# Patient Record
Sex: FEMALE | Race: WHITE | NOT HISPANIC OR LATINO | Employment: OTHER | URBAN - METROPOLITAN AREA
[De-identification: names, ages, dates, MRNs, and addresses within clinical notes are randomized per-mention and may not be internally consistent; named-entity substitution may affect disease eponyms.]

---

## 2017-07-17 ENCOUNTER — OFFICE VISIT (OUTPATIENT)
Dept: PODIATRY | Facility: CLINIC | Age: 62
End: 2017-07-17
Payer: COMMERCIAL

## 2017-07-17 ENCOUNTER — HOSPITAL ENCOUNTER (OUTPATIENT)
Dept: RADIOLOGY | Facility: HOSPITAL | Age: 62
Discharge: HOME | End: 2017-07-17
Attending: PODIATRIST
Payer: COMMERCIAL

## 2017-07-17 VITALS — HEIGHT: 66 IN | BODY MASS INDEX: 35.36 KG/M2 | WEIGHT: 220 LBS

## 2017-07-17 DIAGNOSIS — M79.672 FOOT PAIN, LEFT: ICD-10-CM

## 2017-07-17 DIAGNOSIS — M79.672 FOOT PAIN, LEFT: Primary | ICD-10-CM

## 2017-07-17 DIAGNOSIS — M76.72 PERONEAL TENDONITIS OF LEFT LOWER EXTREMITY: ICD-10-CM

## 2017-07-17 DIAGNOSIS — L60.3 NAIL DYSTROPHY: ICD-10-CM

## 2017-07-17 DIAGNOSIS — Q66.70 PES CAVUS: ICD-10-CM

## 2017-07-17 PROCEDURE — 87107 FUNGI IDENTIFICATION MOLD: CPT

## 2017-07-17 PROCEDURE — 73630 X-RAY EXAM OF FOOT: CPT | Mod: 26,LT,, | Performed by: RADIOLOGY

## 2017-07-17 PROCEDURE — 87102 FUNGUS ISOLATION CULTURE: CPT

## 2017-07-17 PROCEDURE — 99204 OFFICE O/P NEW MOD 45 MIN: CPT | Mod: S$GLB,,, | Performed by: PODIATRIST

## 2017-07-17 PROCEDURE — 99999 PR PBB SHADOW E&M-NEW PATIENT-LVL III: CPT | Mod: PBBFAC,,, | Performed by: PODIATRIST

## 2017-07-17 PROCEDURE — 73630 X-RAY EXAM OF FOOT: CPT | Mod: TC,PO,LT

## 2017-07-17 RX ORDER — CYANOCOBALAMIN 1000 UG/ML
1000 INJECTION, SOLUTION INTRAMUSCULAR; SUBCUTANEOUS
COMMUNITY
Start: 2017-05-09

## 2017-07-17 RX ORDER — METHYLPREDNISOLONE 4 MG/1
TABLET ORAL
Qty: 1 PACKAGE | Refills: 0 | Status: SHIPPED | OUTPATIENT
Start: 2017-07-17 | End: 2017-08-07

## 2017-07-17 RX ORDER — BENAZEPRIL/HYDROCHLOROTHIAZIDE 20 MG-25MG
TABLET ORAL
Status: ON HOLD | COMMUNITY
Start: 2017-04-24 | End: 2024-03-25 | Stop reason: HOSPADM

## 2017-07-17 RX ORDER — ESTROGENS, CONJUGATED 0.45 MG/1
TABLET, FILM COATED ORAL
COMMUNITY
Start: 2017-07-10 | End: 2024-01-19 | Stop reason: CLARIF

## 2017-07-17 RX ORDER — ERGOCALCIFEROL 1.25 MG/1
50000 CAPSULE ORAL
COMMUNITY
Start: 2017-07-10 | End: 2024-01-19 | Stop reason: CLARIF

## 2017-07-17 RX ORDER — LEVOTHYROXINE SODIUM 25 UG/1
TABLET ORAL
Status: ON HOLD | COMMUNITY
Start: 2017-04-24 | End: 2024-03-18 | Stop reason: DRUGHIGH

## 2017-07-17 NOTE — PROGRESS NOTES
Subjective:      Patient ID: Mora Adrian is a 61 y.o. female.    Chief Complaint: Foot Pain (left foot x 1 month) and Nail Problem (nail fungus)  Patient presents to clinic with the chief complaint of Lt. Lateral foot pain x 1 month.  Describes pain as burning and currently rates as a 2/10.  States symptoms are exacerbated with wearing flats and other unsupportive shoes.  Symptoms are alleviated with rest.  Denies trauma to the affected extremity.  Has not attempted to self treat.  Also, relates having discoloration of both great toenails and the Lt. 2nd toenail for > 1 year.  Denies experiencing pain from the nails and has not attempted to self treat.  Suspects she may have a fungal nail infection.  Inquires as to potential treatment options.  Denies any additional pedal complaints.          Past Medical History:   Diagnosis Date    Thyroid disease        Past Surgical History:   Procedure Laterality Date    APPENDECTOMY       SECTION, CLASSIC      GALLBLADDER SURGERY      GASTRIC BYPASS      HYSTERECTOMY         History reviewed. No pertinent family history.    Social History     Social History    Marital status:      Spouse name: N/A    Number of children: N/A    Years of education: N/A     Social History Main Topics    Smoking status: Never Smoker    Smokeless tobacco: Never Used    Alcohol use None    Drug use: Unknown    Sexual activity: Not Asked     Other Topics Concern    None     Social History Narrative    None       Current Outpatient Prescriptions   Medication Sig Dispense Refill    benazepril-hydrochlorthiazide (LOTENSIN HCT) 20-25 mg Tab       cyanocobalamin 1,000 mcg/mL injection       ergocalciferol (ERGOCALCIFEROL) 50,000 unit Cap       levothyroxine (SYNTHROID) 25 MCG tablet       PREMARIN 0.45 mg tablet       levothyroxine (SYNTHROID) 200 MCG tablet Take 200 mcg by mouth once daily.      methylPREDNISolone (MEDROL DOSEPACK) 4 mg tablet use as directed 1  Package 0     No current facility-administered medications for this visit.        Review of patient's allergies indicates:  No Known Allergies    Review of Systems   Constitution: Negative for chills and fever.   Cardiovascular: Negative for claudication and leg swelling.   Skin: Positive for color change and nail changes.   Musculoskeletal: Positive for myalgias. Negative for joint pain and joint swelling.   Neurological: Negative for numbness and paresthesias.   Psychiatric/Behavioral: Negative for altered mental status.           Objective:      Physical Exam   Constitutional: She is oriented to person, place, and time. She appears well-developed and well-nourished. No distress.   Cardiovascular:   Pulses:       Dorsalis pedis pulses are 2+ on the right side, and 2+ on the left side.        Posterior tibial pulses are 2+ on the right side, and 2+ on the left side.   CFT <3 seconds bilateral.  Pedal hair growth present bilateral.   No varicosities noted bilateral.  No bilateral lower extremity edema.   Musculoskeletal: She exhibits edema and tenderness.   Muscle strength 5/5 in all muscle groups bilateral.  No tenderness nor crepitation with ROM of foot/ankle joints bilateral.  Pain with palpation at the insertion of the Lt. Peroneus brevis tendon.  Mild, localized edema noted at the insertion as well.  Bilateral pes cavus foot type.      Neurological: She is alert and oriented to person, place, and time. She has normal strength. No sensory deficit.   Light touch intact bilateral.     Skin: Skin is warm, dry and intact. Capillary refill takes less than 2 seconds. No abrasion, no bruising, no burn, no laceration, no lesion, no petechiae and no rash noted. She is not diaphoretic. No erythema. No pallor.   Pedal skin has normal turgor, temperature, and texture bilateral.  Bilateral hallux toenail and the Lt. 2nd toenail appear thickened by 2 mm's, of normal length, and discolored with subungual debris.  Remaining  toenails x 7 appear normotrophic. Examination of the skin reveals no evidence of significant maceration, rashes, open lesions, suspicious appearing nevi or other concerning lesions.              Assessment:       Encounter Diagnoses   Name Primary?    Foot pain, left Yes    Peroneal tendonitis of left lower extremity     Pes cavus     Nail dystrophy          Plan:       Mora was seen today for foot pain and nail problem.    Diagnoses and all orders for this visit:    Foot pain, left  -     X-Ray Foot Complete Left; Future    Peroneal tendonitis of left lower extremity  -     methylPREDNISolone (MEDROL DOSEPACK) 4 mg tablet; use as directed    Pes cavus    Nail dystrophy  -     CULTURE, FUNGUS      I counseled the patient on her conditions, their implications and medical management.    With the patient's verbal consent, a sterile nail nipper was used to trim a portion of the Rt. Hallux toenail without incident.  This was then sent to [facility].  Patient tolerated this quite well.      Will further discuss treatment of onychomycosis pending culture results.    Advised to RICE the affected extremity.    Advised to take both an oral nsaid and to apply a topical analgesic to the peroneus brevis tendon.    Discussed avoidance of wearing flats, flip flops, and walking barefoot as this will exacerbate symptoms.    Discussed appropriate fitting shoe gear and OTC insoles to better control over supination.    RTC in 6 weeks for follow up.    Charlie Page DPM

## 2017-07-17 NOTE — PATIENT INSTRUCTIONS
Excipial cream with 20% urea.  Found at [employer].    Recommend applying ice to the outside of the foot up to 40 minutes daily.    Recommend wearing more supportive shoes x 6 weeks.    Avoid barefoot walking, flip flops, and flats as this will exacerbate symptoms.    Recommend applying a topical pain cream (aspercream) to the site of pain 2-4 times daily.    Continue taking advil to address inflammation.

## 2017-08-23 LAB — FUNGUS SPEC CULT: NORMAL

## 2019-09-09 DIAGNOSIS — Z12.39 SCREENING BREAST EXAMINATION: Primary | ICD-10-CM

## 2019-09-26 ENCOUNTER — HOSPITAL ENCOUNTER (OUTPATIENT)
Dept: RADIOLOGY | Facility: HOSPITAL | Age: 64
Discharge: HOME | End: 2019-09-26
Attending: SPECIALIST
Payer: COMMERCIAL

## 2019-09-26 DIAGNOSIS — Z12.39 SCREENING BREAST EXAMINATION: ICD-10-CM

## 2019-09-26 PROCEDURE — 77067 SCR MAMMO BI INCL CAD: CPT | Mod: TC,PO

## 2020-11-09 DIAGNOSIS — Z12.31 ENCOUNTER FOR SCREENING MAMMOGRAM FOR MALIGNANT NEOPLASM OF BREAST: Primary | ICD-10-CM

## 2020-12-09 ENCOUNTER — HOSPITAL ENCOUNTER (OUTPATIENT)
Dept: RADIOLOGY | Facility: HOSPITAL | Age: 65
Discharge: HOME | End: 2020-12-09
Attending: SPECIALIST
Payer: MEDICARE

## 2020-12-09 VITALS — WEIGHT: 220 LBS | HEIGHT: 66 IN | BODY MASS INDEX: 35.36 KG/M2

## 2020-12-09 DIAGNOSIS — Z12.31 ENCOUNTER FOR SCREENING MAMMOGRAM FOR MALIGNANT NEOPLASM OF BREAST: ICD-10-CM

## 2020-12-09 PROCEDURE — 77067 SCR MAMMO BI INCL CAD: CPT | Mod: TC,PO

## 2022-01-17 DIAGNOSIS — Z12.31 ENCOUNTER FOR SCREENING MAMMOGRAM FOR MALIGNANT NEOPLASM OF BREAST: Primary | ICD-10-CM

## 2022-02-17 ENCOUNTER — HOSPITAL ENCOUNTER (OUTPATIENT)
Dept: RADIOLOGY | Facility: HOSPITAL | Age: 67
Discharge: HOME | End: 2022-02-17
Attending: SPECIALIST
Payer: MEDICARE

## 2022-02-17 DIAGNOSIS — Z12.31 ENCOUNTER FOR SCREENING MAMMOGRAM FOR MALIGNANT NEOPLASM OF BREAST: ICD-10-CM

## 2022-02-17 PROCEDURE — 77063 BREAST TOMOSYNTHESIS BI: CPT | Mod: TC,PO

## 2022-03-24 ENCOUNTER — TELEPHONE (OUTPATIENT)
Dept: ORTHOPEDICS | Facility: CLINIC | Age: 67
End: 2022-03-24

## 2022-03-24 NOTE — TELEPHONE ENCOUNTER
----- Message from Susan Miller sent at 3/23/2022  5:52 PM CDT -----  Regarding: Letter  Good afternoon,    MMI LETTER DATED [id number] FROM [facility] Response  Scanned in media tab of patient's chart  Please fax response to our ofc at [phone]  Once completed thanks

## 2024-01-31 ENCOUNTER — TELEPHONE (OUTPATIENT)
Dept: CARDIOTHORACIC SURGERY | Facility: CLINIC | Age: 69
End: 2024-01-31
Payer: MEDICARE

## 2024-01-31 NOTE — TELEPHONE ENCOUNTER
Called pt to schedule consultation appointment with Dr. Bernal after receiving referral from Dr. Card. Scheduled pt for next available appointment. Pt confirms she has angiogram images on a disk and will bring to her appointment. Pt verbalized understanding of appointment date, time, and location.

## 2024-02-19 ENCOUNTER — TELEPHONE (OUTPATIENT)
Dept: CARDIOTHORACIC SURGERY | Facility: CLINIC | Age: 69
End: 2024-02-19
Payer: MEDICARE

## 2024-02-19 NOTE — TELEPHONE ENCOUNTER
Called pt to remind apt for tomorrow to confirmed apt.  Contact no was provided if pt wants to call back.   Portal message was sent as well.

## 2024-02-20 ENCOUNTER — OFFICE VISIT (OUTPATIENT)
Dept: CARDIOTHORACIC SURGERY | Facility: CLINIC | Age: 69
End: 2024-02-20
Payer: MEDICARE

## 2024-02-20 VITALS
HEIGHT: 66 IN | HEART RATE: 89 BPM | BODY MASS INDEX: 35.17 KG/M2 | OXYGEN SATURATION: 100 % | WEIGHT: 218.81 LBS | SYSTOLIC BLOOD PRESSURE: 148 MMHG | DIASTOLIC BLOOD PRESSURE: 67 MMHG

## 2024-02-20 DIAGNOSIS — I34.0 NONRHEUMATIC MITRAL VALVE REGURGITATION: Primary | ICD-10-CM

## 2024-02-20 PROCEDURE — 99205 OFFICE O/P NEW HI 60 MIN: CPT | Mod: S$PBB,,, | Performed by: THORACIC SURGERY (CARDIOTHORACIC VASCULAR SURGERY)

## 2024-02-20 PROCEDURE — 99213 OFFICE O/P EST LOW 20 MIN: CPT | Mod: PBBFAC | Performed by: THORACIC SURGERY (CARDIOTHORACIC VASCULAR SURGERY)

## 2024-02-20 PROCEDURE — 99999 PR PBB SHADOW E&M-EST. PATIENT-LVL III: CPT | Mod: PBBFAC,,, | Performed by: THORACIC SURGERY (CARDIOTHORACIC VASCULAR SURGERY)

## 2024-02-20 RX ORDER — ESTRADIOL 0.05 MG/D
1 FILM, EXTENDED RELEASE TRANSDERMAL
COMMUNITY

## 2024-02-20 RX ORDER — BENAZEPRIL HYDROCHLORIDE AND HYDROCHLOROTHIAZIDE 20; 12.5 MG/1; MG/1
1 TABLET ORAL EVERY MORNING
Status: ON HOLD | COMMUNITY
Start: 2024-01-22 | End: 2024-03-25 | Stop reason: HOSPADM

## 2024-02-20 RX ORDER — BRIMONIDINE TARTRATE 2 MG/ML
1 SOLUTION/ DROPS OPHTHALMIC 2 TIMES DAILY
COMMUNITY
Start: 2023-09-14

## 2024-02-20 RX ORDER — METOPROLOL SUCCINATE 25 MG/1
1 TABLET, EXTENDED RELEASE ORAL DAILY
Status: ON HOLD | COMMUNITY
Start: 2024-01-17 | End: 2024-03-25 | Stop reason: HOSPADM

## 2024-02-20 NOTE — PROGRESS NOTES
Subjective:      Patient ID: Mora Adrian is a 68 y.o. female.    Chief Complaint: No chief complaint on file.    HPI:  Mora Adrian is a 68 y.o. female who presents as an initial consult for severe mitral regurgitation.  She has a medical history significant for anxiety, arthritis, GERD, hypertension, hyperlipidemia, hypothyroidism and HFrEF.  She reports she was preparing for a knee operation and attempted to get cardiac clearance.  She underwent an evaluation which demonstrated severe mitral regurgitation, severely dilated left and right atrium, and an ejection fraction of 35-40%.  She denies any symptoms including dyspnea on exertion, chest pain, palpations, or lower extremity edema. She reports recently returning from [facility] and being able to ambulate without difficulty.     Family and social history reviewed    Review of patient's allergies indicates:  No Known Allergies  Past Medical History:   Diagnosis Date    Anxiety     Arthritis     GERD (gastroesophageal reflux disease)     Hyperlipidemia     Hypertension     Insomnia     Mitral valve insufficiency, unspecified etiology 2024    Thyroid disease     hypo     Past Surgical History:   Procedure Laterality Date    APPENDECTOMY      BREAST CYST ASPIRATION       SECTION, CLASSIC      EYE SURGERY Bilateral     cataracts w/iol    GALLBLADDER SURGERY      GASTRIC BYPASS      HYSTERECTOMY      TOTAL KNEE ARTHROPLASTY Right     TRANSESOPHAGEAL ECHOCARDIOGRAPHY N/A 2024    Procedure: ECHOCARDIOGRAM, TRANSESOPHAGEAL;  Surgeon: Bobby Card III, MD;  Location: [facility];  Service: Cardiology;  Laterality: N/A;     Family History       Problem Relation (Age of Onset)    Cirrhosis Father          Social History     Socioeconomic History    Marital status:    Tobacco Use    Smoking status: Never    Smokeless tobacco: Never   Substance and Sexual Activity    Alcohol use: Yes     Alcohol/week: 10.0 standard drinks of alcohol     Types:  10 Glasses of wine per week    Drug use: Never     Social Determinants of Health     Financial Resource Strain: Low Risk  (2/19/2024)    Overall Financial Resource Strain (CARDIA)     Difficulty of Paying Living Expenses: Not hard at all   Food Insecurity: No Food Insecurity (2/19/2024)    Hunger Vital Sign     Worried About Running Out of Food in the Last Year: Never true     Ran Out of Food in the Last Year: Never true   Transportation Needs: No Transportation Needs (2/19/2024)    PRAPARE - Transportation     Lack of Transportation (Medical): No     Lack of Transportation (Non-Medical): No   Physical Activity: Unknown (2/19/2024)    Exercise Vital Sign     Days of Exercise per Week: 0 days   Stress: No Stress Concern Present (2/19/2024)    [employer] - Occupational Stress Questionnaire     Feeling of Stress : Only a little   Social Connections: Unknown (2/19/2024)    Social Connection and Isolation Panel [NHANES]     Frequency of Communication with Friends and Family: More than three times a week     Frequency of Social Gatherings with Friends and Family: More than three times a week     Active Member of Clubs or Organizations: Yes     Attends Club or Organization Meetings: More than 4 times per year     Marital Status:    Housing Stability: Unknown (2/19/2024)    Housing Stability Vital Sign     Unable to Pay for Housing in the Last Year: No     Unstable Housing in the Last Year: No       Current Outpatient Medications:     benazepril-hydrochlorthiazide (LOTENSIN HCT) 20-12.5 mg per tablet, Take 1 tablet by mouth every morning., Disp: , Rfl:     benazepril-hydrochlorthiazide (LOTENSIN HCT) 20-25 mg Tab, , Disp: , Rfl:     brimonidine 0.2% (ALPHAGAN) 0.2 % Drop, Place 1 drop into the right eye 2 (two) times daily., Disp: , Rfl:     cyanocobalamin 1,000 mcg/mL injection, 1,000 mcg every 30 days., Disp: , Rfl:     estradiol 0.05 mg/24 hr td ptsw (VIVELLE-DOT) 0.05 mg/24 hr, 1  patch., Disp: , Rfl:     ezetimibe (ZETIA) 10 mg tablet, Take 10 mg by mouth once daily., Disp: , Rfl:     levothyroxine (SYNTHROID) 150 MCG tablet, Take 150 mcg by mouth before breakfast., Disp: , Rfl:     metoprolol succinate (TOPROL-XL) 25 MG 24 hr tablet, Take 1 tablet by mouth once daily., Disp: , Rfl:     pantoprazole (PROTONIX) 40 MG tablet, Take 40 mg by mouth once daily., Disp: , Rfl:     vitamin D (VITAMIN D3) 1000 units Tab, Take 1,000 Units by mouth once daily., Disp: , Rfl:     zinc gluconate 50 mg tablet, Take 50 mg by mouth once daily., Disp: , Rfl:     levothyroxine (SYNTHROID) 25 MCG tablet, , Disp: , Rfl:   Current medications Reviewed    Review of Systems   Constitutional:  Negative for activity change, appetite change, fatigue and fever.   HENT:  Negative for nosebleeds.    Respiratory:  Negative for cough and shortness of breath.    Cardiovascular:  Negative for chest pain, palpitations and leg swelling.   Gastrointestinal:  Negative for abdominal distention, abdominal pain and nausea.   Genitourinary:  Negative for frequency.   Musculoskeletal:  Negative for arthralgias and myalgias.   Skin:  Negative for rash.   Neurological:  Negative for dizziness and numbness.   Hematological:  Does not bruise/bleed easily.     Objective:   Physical Exam  Constitutional:       Appearance: Normal appearance.   HENT:      Head: Normocephalic.   Eyes:      Pupils: Pupils are equal, round, and reactive to light.   Cardiovascular:      Rate and Rhythm: Normal rate and regular rhythm.      Pulses: Normal pulses.   Pulmonary:      Effort: Pulmonary effort is normal.   Abdominal:      General: Abdomen is flat. Bowel sounds are normal.      Palpations: Abdomen is soft.   Musculoskeletal:         General: Normal range of motion.   Skin:     General: Skin is warm and dry.   Neurological:      General: No focal deficit present.      Mental Status: She is alert.         Diagnostic Results: Reviewed   ECHO (PACO)  1/25/2024:  Ejection fraction 35-40%  Left atrium is severely dilated   Right atrium is mildly dilated   Mitral valve has myomatous changes and severe mitral regurgitation    Assessment:   Severe Mitral Regurgitation   Plan:     CTS Attending Note:    I have personally taken the history and examined this patient and agree with the REINA's note as stated above.  Very pleasant 68-year-old woman who was being evaluated for knee replacement.  She had an abnormal EKG, and this led to a thoughtful and thorough evaluation which included transesophageal echocardiography as well as coronary angiography.  The coronary angiogram failed to demonstrate any hemodynamically significant lesions.  The echo demonstrated severe mitral regurgitation as well as a reduced ejection fraction.  She does report symptoms of decreased stamina and increased fatigue.  Due to her orthopedic issues, she does not have significant dyspnea on exertion.  I discussed her situation with her and her daughter in detail.  I recommended mitral valve repair, and she agreed with this.  I also recommended resection of left atrial appendage.  She does not have a history of atrial fibrillation, but she is clearly at risk.  She agreed with this as well.  We discussed the PRIMARY trial.  She is not interested in enrollment.  We discussed the risks and benefits of the proposed procedure, including but not limited to death, stroke, respiratory complications, renal complications, arrythmia, need for permanent pacemaker, and infection. We discussed the STS predicted risk. Her questions have been answered, and she wishes to proceed. After a discussion of the advantages and disadvantages of tissue and mechanical prostheses, she indicated that she desires a mechanical valve should repair not be feasible.

## 2024-02-23 ENCOUNTER — TELEPHONE (OUTPATIENT)
Dept: CARDIOTHORACIC SURGERY | Facility: CLINIC | Age: 69
End: 2024-02-23
Payer: MEDICARE

## 2024-02-23 DIAGNOSIS — R79.9 ABNORMAL FINDING OF BLOOD CHEMISTRY, UNSPECIFIED: ICD-10-CM

## 2024-02-23 DIAGNOSIS — R79.1 ABNORMAL COAGULATION PROFILE: ICD-10-CM

## 2024-02-23 DIAGNOSIS — I34.0 NONRHEUMATIC MITRAL VALVE REGURGITATION: Primary | ICD-10-CM

## 2024-02-23 DIAGNOSIS — Z01.818 PRE-OP TESTING: ICD-10-CM

## 2024-02-23 DIAGNOSIS — R74.8 ABNORMAL LEVELS OF OTHER SERUM ENZYMES: ICD-10-CM

## 2024-02-23 NOTE — TELEPHONE ENCOUNTER
Called pt to review pre-op testing appointments, but pt unavailable at this time. She will call me back to discuss. Pt has my direct number to call me back at her convenience.

## 2024-02-26 ENCOUNTER — TELEPHONE (OUTPATIENT)
Dept: CARDIOTHORACIC SURGERY | Facility: CLINIC | Age: 69
End: 2024-02-26
Payer: MEDICARE

## 2024-02-26 NOTE — TELEPHONE ENCOUNTER
Called pt to review pre-op testing appointments which have been scheduled for March 11 and March 15. Pt reports she recently had carotid ultrasound at her PCP's office. Procured copy of report which is dated October 2023. Will scan to media. Reviewed pt's medications. Pt will need to take last dose of benazepril-hydrochlorthiazide on Monday, March 11. Will send appointment details to pt's e-mail per her request. Pt verbalized understanding of all information.

## 2024-02-28 ENCOUNTER — TELEPHONE (OUTPATIENT)
Dept: CARDIOTHORACIC SURGERY | Facility: CLINIC | Age: 69
End: 2024-02-28
Payer: MEDICARE

## 2024-02-28 NOTE — TELEPHONE ENCOUNTER
Received call from pt reporting she tested positive for COVID yesterday, February 27. She reports her symptoms started over the weekend on Sunday, February 25. Advised pt that she must be at least 10 days from the onset of symptoms and symptom free to proceed with her testing appointments and scheduled surgery. Will follow up with pt next week on March 6, which will be 10 days from the onset of her symptoms, to see how she is feeling. Pt agreeable and verbalized understanding.

## 2024-03-01 ENCOUNTER — ANESTHESIA EVENT (OUTPATIENT)
Dept: SURGERY | Facility: HOSPITAL | Age: 69
DRG: 220 | End: 2024-03-01
Payer: MEDICARE

## 2024-03-07 ENCOUNTER — TELEPHONE (OUTPATIENT)
Dept: CARDIOTHORACIC SURGERY | Facility: CLINIC | Age: 69
End: 2024-03-07
Payer: MEDICARE

## 2024-03-07 NOTE — TELEPHONE ENCOUNTER
Received call from pt reporting she is feeling well and has no residual symptoms from her COVID infection.

## 2024-03-11 ENCOUNTER — TELEPHONE (OUTPATIENT)
Dept: CARDIOTHORACIC SURGERY | Facility: CLINIC | Age: 69
End: 2024-03-11
Payer: MEDICARE

## 2024-03-11 ENCOUNTER — HOSPITAL ENCOUNTER (OUTPATIENT)
Dept: CARDIOLOGY | Facility: HOSPITAL | Age: 69
Discharge: HOME | End: 2024-03-11
Attending: THORACIC SURGERY (CARDIOTHORACIC VASCULAR SURGERY)
Payer: MEDICARE

## 2024-03-11 VITALS — BODY MASS INDEX: 35.03 KG/M2 | HEIGHT: 66 IN | WEIGHT: 218 LBS

## 2024-03-11 DIAGNOSIS — I34.0 NONRHEUMATIC MITRAL VALVE REGURGITATION: ICD-10-CM

## 2024-03-11 DIAGNOSIS — Z01.818 PRE-OP TESTING: ICD-10-CM

## 2024-03-11 LAB
ASCENDING AORTA: 2.37 CM
AV INDEX (PROSTH): 0.71
AV MEAN GRADIENT: 3 MMHG
AV PEAK GRADIENT: 5 MMHG
AV VALVE AREA BY VELOCITY RATIO: 3.03 CM²
AV VALVE AREA: 3.12 CM²
AV VELOCITY RATIO: 0.69
BSA FOR ECHO PROCEDURE: 2.15 M2
CV ECHO LV RWT: 0.29 CM
DOP CALC AO PEAK VEL: 1.11 M/S
DOP CALC AO VTI: 25.2 CM
DOP CALC LVOT AREA: 4.4 CM2
DOP CALC LVOT DIAMETER: 2.36 CM
DOP CALC LVOT PEAK VEL: 0.77 M/S
DOP CALC LVOT STROKE VOLUME: 78.7 CM3
DOP CALCLVOT PEAK VEL VTI: 18 CM
E WAVE DECELERATION TIME: 149.27 MSEC
E/A RATIO: 2.78
E/E' RATIO: 13.06 M/S
ECHO LV POSTERIOR WALL: 0.87 CM (ref 0.6–1.1)
EJECTION FRACTION: 40 %
FRACTIONAL SHORTENING: 15 % (ref 28–44)
INTERVENTRICULAR SEPTUM: 0.92 CM (ref 0.6–1.1)
LEFT ATRIUM SIZE: 4.66 CM
LEFT ATRIUM VOLUME INDEX MOD: 50.5 ML/M2
LEFT ATRIUM VOLUME MOD: 104.55 CM3
LEFT INTERNAL DIMENSION IN SYSTOLE: 5.1 CM (ref 2.1–4)
LEFT VENTRICLE DIASTOLIC VOLUME INDEX: 86.54 ML/M2
LEFT VENTRICLE DIASTOLIC VOLUME: 179.13 ML
LEFT VENTRICLE MASS INDEX: 103 G/M2
LEFT VENTRICLE SYSTOLIC VOLUME INDEX: 60.7 ML/M2
LEFT VENTRICLE SYSTOLIC VOLUME: 125.59 ML
LEFT VENTRICULAR INTERNAL DIMENSION IN DIASTOLE: 5.99 CM (ref 3.5–6)
LEFT VENTRICULAR MASS: 213.58 G
LV LATERAL E/E' RATIO: 11.1 M/S
LV SEPTAL E/E' RATIO: 15.86 M/S
LVOT MG: 1.3 MMHG
LVOT MV: 0.53 CM/S
MR PISA EROA: 0.14 CM2
MV PEAK A VEL: 0.4 M/S
MV PEAK E VEL: 1.11 M/S
MV STENOSIS PRESSURE HALF TIME: 43.29 MS
MV VALVE AREA P 1/2 METHOD: 5.08 CM2
PISA MRMAX VEL: 5.45 M/S
PISA RADIUS: 0.63 CM
PISA TR MAX VEL: 2.88 M/S
PISA VN NYQUIST MS: 0.31 M/S
PISA VN NYQUIST: 0.31 M/S
PULM VEIN S/D RATIO: 0.47
PV PEAK D VEL: 0.79 M/S
PV PEAK S VEL: 0.37 M/S
RA PRESSURE ESTIMATED: 3 MMHG
RIGHT VENTRICULAR END-DIASTOLIC DIMENSION: 3.89 CM
RIGHT VENTRICULAR LENGTH IN DIASTOLE (APICAL 4-CHAMBER VIEW): 6.91 CM
RV MID DIAMA: 2.1 CM
RV TB RVSP: 6 MMHG
RV TISSUE DOPPLER FREE WALL SYSTOLIC VELOCITY 1 (APICAL 4 CHAMBER VIEW): 13.09 CM/S
SINUS: 2.78 CM
STJ: 2.65 CM
TDI LATERAL: 0.1 M/S
TDI SEPTAL: 0.07 M/S
TDI: 0.09 M/S
TR MAX PG: 33 MMHG
TRICUSPID ANNULAR PLANE SYSTOLIC EXCURSION: 2.2 CM
TV REST PULMONARY ARTERY PRESSURE: 36 MMHG
Z-SCORE OF LEFT VENTRICULAR DIMENSION IN END DIASTOLE: -0.49
Z-SCORE OF LEFT VENTRICULAR DIMENSION IN END SYSTOLE: 2.22

## 2024-03-11 PROCEDURE — 93306 TTE W/DOPPLER COMPLETE: CPT | Mod: PO

## 2024-03-11 PROCEDURE — 93306 TTE W/DOPPLER COMPLETE: CPT | Mod: 26,,, | Performed by: INTERNAL MEDICINE

## 2024-03-11 NOTE — TELEPHONE ENCOUNTER
Called pt to remind her to take last dose of benazepril-hydrochlorthiazide today in preparation for surgery. Pt verbalized understanding.  
WDL

## 2024-03-13 NOTE — H&P (VIEW-ONLY)
Subjective:      Patient ID: Mora Adrian is a 68 y.o. female.    Chief Complaint: No chief complaint on file.      HPI:  Mora Adrian is a 68 y.o. female who presents for pre-op mitral repair. Medical conditions include anxiety, arthritis, GERD, hypertension, hyperlipidemia, hypothyroidism and HFrEF. She reports she was preparing for a knee operation and attempted to get cardiac clearance. She underwent an evaluation which demonstrated severe mitral regurgitation, severely dilated left and right atrium, and an ejection fraction of 35-40%. She denies any symptoms including dyspnea on exertion, chest pain, palpations, or lower extremity edema. She reports recently returning from [facility] and being able to ambulate without difficulty.     Family and social history reviewed    Current Outpatient Medications   Medication Instructions    benazepril-hydrochlorthiazide (LOTENSIN HCT) 20-12.5 mg per tablet 1 tablet, Oral, Every morning    benazepril-hydrochlorthiazide (LOTENSIN HCT) 20-25 mg Tab No dose, route, or frequency recorded.    brimonidine 0.2% (ALPHAGAN) 0.2 % Drop 1 drop, Right Eye, 2 times daily    cyanocobalamin 1,000 mcg, Every 30 days    estradiol 0.05 mg/24 hr td ptsw (VIVELLE-DOT) 0.05 mg/24 hr 1 patch    ezetimibe (ZETIA) 10 mg, Oral, Daily    levothyroxine (SYNTHROID) 25 MCG tablet No dose, route, or frequency recorded.    levothyroxine (SYNTHROID) 150 mcg, Oral, Before breakfast    metoprolol succinate (TOPROL-XL) 25 MG 24 hr tablet 1 tablet, Oral, Daily    pantoprazole (PROTONIX) 40 mg, Oral, Daily    vitamin D (VITAMIN D3) 1,000 Units, Oral, Daily    zinc gluconate 50 mg, Oral, Daily         Review of patient's allergies indicates:  No Known Allergies  Past Medical History:   Diagnosis Date    Anxiety     Arthritis     GERD (gastroesophageal reflux disease)     Hyperlipidemia     Hypertension     Insomnia     Mitral valve insufficiency, unspecified etiology 01/2024    Thyroid disease     hypo      Past Surgical History:   Procedure Laterality Date    APPENDECTOMY      BREAST CYST ASPIRATION       SECTION, CLASSIC      EYE SURGERY Bilateral     cataracts w/iol    GALLBLADDER SURGERY      GASTRIC BYPASS      HYSTERECTOMY      TOTAL KNEE ARTHROPLASTY Right     TRANSESOPHAGEAL ECHOCARDIOGRAPHY N/A 2024    Procedure: ECHOCARDIOGRAM, TRANSESOPHAGEAL;  Surgeon: Bobby Card III, MD;  Location: [facility];  Service: Cardiology;  Laterality: N/A;     Family History       Problem Relation (Age of Onset)    Cirrhosis Father          Social History     Socioeconomic History    Marital status:    Tobacco Use    Smoking status: Never    Smokeless tobacco: Never   Substance and Sexual Activity    Alcohol use: Yes     Alcohol/week: 10.0 standard drinks of alcohol     Types: 10 Glasses of wine per week    Drug use: Never     Social Determinants of Health     Financial Resource Strain: Low Risk  (2024)    Overall Financial Resource Strain (CARDIA)     Difficulty of Paying Living Expenses: Not hard at all   Food Insecurity: No Food Insecurity (2024)    Hunger Vital Sign     Worried About Running Out of Food in the Last Year: Never true     Ran Out of Food in the Last Year: Never true   Transportation Needs: No Transportation Needs (2024)    PRAPARE - Transportation     Lack of Transportation (Medical): No     Lack of Transportation (Non-Medical): No   Physical Activity: Unknown (2024)    Exercise Vital Sign     Days of Exercise per Week: 0 days   Stress: No Stress Concern Present (2024)    [employer] - Occupational Stress Questionnaire     Feeling of Stress : Only a little   Social Connections: Unknown (2024)    Social Connection and Isolation Panel [NHANES]     Frequency of Communication with Friends and Family: More than three times a week     Frequency of Social Gatherings with Friends and Family: More than three times a week     Active  Member of Clubs or Organizations: Yes     Attends Club or Organization Meetings: More than 4 times per year     Marital Status:    Housing Stability: Unknown (2/19/2024)    Housing Stability Vital Sign     Unable to Pay for Housing in the Last Year: No     Unstable Housing in the Last Year: No       Current medications Reviewed    Review of Systems   Constitutional:  Positive for fatigue.   HENT:  Negative for nosebleeds.    Eyes:  Negative for visual disturbance.   Respiratory:  Negative for shortness of breath.    Cardiovascular:  Negative for chest pain and leg swelling.   Gastrointestinal:  Negative for nausea.   Musculoskeletal:  Negative for gait problem.   Skin:  Negative for color change.   Neurological:  Negative for dizziness.   Hematological:  Does not bruise/bleed easily.   Psychiatric/Behavioral:  Negative for sleep disturbance.      Objective:   Physical Exam  Constitutional:       General: She is not in acute distress.  HENT:      Head: Normocephalic and atraumatic.   Eyes:      Pupils: Pupils are equal, round, and reactive to light.   Cardiovascular:      Rate and Rhythm: Normal rate.   Pulmonary:      Effort: Pulmonary effort is normal. No respiratory distress.   Musculoskeletal:         General: Normal range of motion.      Cervical back: Normal range of motion.   Skin:     Coloration: Skin is not pale.   Neurological:      General: No focal deficit present.      Mental Status: She is alert.   Psychiatric:         Mood and Affect: Mood normal.         Behavior: Behavior normal.         Diagnostic Results: reviewed   PACO 1/25/2024:  Ejection fraction 35-40%  Left atrium is severely dilated   Right atrium is mildly dilated   Mitral valve has myomatous changes and severe mitral regurgitation     TTE 3/11/24       Left Ventricle: The left ventricle is moderately dilated. Normal wall thickness. There is eccentric hypertrophy. There is mildly reduced systolic function. Ejection fraction by visual  approximation is 40%.    Right Ventricle: Normal right ventricular cavity size. Wall thickness is normal. Right ventricle wall motion  is normal. Systolic function is normal.    Left Atrium: Left atrium is dilated. The left atrium volume index MOD is 50.5 mL/m2.    Mitral Valve: There is modearte to moderate/severe regurgitation.  Based on images of mitral valve structure, MR seems more functional as opposed to degenerative, but can not rule out degenerative based on these images    Tricuspid Valve: There is mild regurgitation.    Pulmonary Artery: The estimated pulmonary artery systolic pressure is 36 mmHg.    IVC/SVC: Normal venous pressure at 3 mmHg.     There is modearte to moderate/severe regurgitation. Based on images of mitral valve structure, MR seems more functional as opposed to degenerative, but can not rule out degenerative based on these images.     [facility] clean   Assessment:   MR  Plan:   Pre-op mitral repair and Left atrial appendage ligation. Desires mechanical valve if repair not feasible.       CTS Attending Note:    I have personally taken the history and examined this patient and agree with the REINA's note as stated above.  68-year-old woman with severe mitral regurgitation.  We plan mitral valve repair.  We also plan to resect her left atrial appendage.  Her questions have been answered, and she wishes to proceed.

## 2024-03-13 NOTE — PROGRESS NOTES
Subjective:      Patient ID: Mora Adrian is a 68 y.o. female.    Chief Complaint: No chief complaint on file.      HPI:  Mora Adrian is a 68 y.o. female who presents for pre-op mitral repair. Medical conditions include anxiety, arthritis, GERD, hypertension, hyperlipidemia, hypothyroidism and HFrEF. She reports she was preparing for a knee operation and attempted to get cardiac clearance. She underwent an evaluation which demonstrated severe mitral regurgitation, severely dilated left and right atrium, and an ejection fraction of 35-40%. She denies any symptoms including dyspnea on exertion, chest pain, palpations, or lower extremity edema. She reports recently returning from [facility] and being able to ambulate without difficulty.     Family and social history reviewed    Current Outpatient Medications   Medication Instructions    benazepril-hydrochlorthiazide (LOTENSIN HCT) 20-12.5 mg per tablet 1 tablet, Oral, Every morning    benazepril-hydrochlorthiazide (LOTENSIN HCT) 20-25 mg Tab No dose, route, or frequency recorded.    brimonidine 0.2% (ALPHAGAN) 0.2 % Drop 1 drop, Right Eye, 2 times daily    cyanocobalamin 1,000 mcg, Every 30 days    estradiol 0.05 mg/24 hr td ptsw (VIVELLE-DOT) 0.05 mg/24 hr 1 patch    ezetimibe (ZETIA) 10 mg, Oral, Daily    levothyroxine (SYNTHROID) 25 MCG tablet No dose, route, or frequency recorded.    levothyroxine (SYNTHROID) 150 mcg, Oral, Before breakfast    metoprolol succinate (TOPROL-XL) 25 MG 24 hr tablet 1 tablet, Oral, Daily    pantoprazole (PROTONIX) 40 mg, Oral, Daily    vitamin D (VITAMIN D3) 1,000 Units, Oral, Daily    zinc gluconate 50 mg, Oral, Daily         Review of patient's allergies indicates:  No Known Allergies  Past Medical History:   Diagnosis Date    Anxiety     Arthritis     GERD (gastroesophageal reflux disease)     Hyperlipidemia     Hypertension     Insomnia     Mitral valve insufficiency, unspecified etiology 01/2024    Thyroid disease     hypo      Past Surgical History:   Procedure Laterality Date    APPENDECTOMY      BREAST CYST ASPIRATION       SECTION, CLASSIC      EYE SURGERY Bilateral     cataracts w/iol    GALLBLADDER SURGERY      GASTRIC BYPASS      HYSTERECTOMY      TOTAL KNEE ARTHROPLASTY Right     TRANSESOPHAGEAL ECHOCARDIOGRAPHY N/A 2024    Procedure: ECHOCARDIOGRAM, TRANSESOPHAGEAL;  Surgeon: Bobby Card III, MD;  Location: [facility];  Service: Cardiology;  Laterality: N/A;     Family History       Problem Relation (Age of Onset)    Cirrhosis Father          Social History     Socioeconomic History    Marital status:    Tobacco Use    Smoking status: Never    Smokeless tobacco: Never   Substance and Sexual Activity    Alcohol use: Yes     Alcohol/week: 10.0 standard drinks of alcohol     Types: 10 Glasses of wine per week    Drug use: Never     Social Determinants of Health     Financial Resource Strain: Low Risk  (2024)    Overall Financial Resource Strain (CARDIA)     Difficulty of Paying Living Expenses: Not hard at all   Food Insecurity: No Food Insecurity (2024)    Hunger Vital Sign     Worried About Running Out of Food in the Last Year: Never true     Ran Out of Food in the Last Year: Never true   Transportation Needs: No Transportation Needs (2024)    PRAPARE - Transportation     Lack of Transportation (Medical): No     Lack of Transportation (Non-Medical): No   Physical Activity: Unknown (2024)    Exercise Vital Sign     Days of Exercise per Week: 0 days   Stress: No Stress Concern Present (2024)    [employer] [place] [employer] - Occupational Stress Questionnaire     Feeling of Stress : Only a little   Social Connections: Unknown (2024)    Social Connection and Isolation Panel [NHANES]     Frequency of Communication with Friends and Family: More than three times a week     Frequency of Social Gatherings with Friends and Family: More than three times a week     Active  Member of Clubs or Organizations: Yes     Attends Club or Organization Meetings: More than 4 times per year     Marital Status:    Housing Stability: Unknown (2/19/2024)    Housing Stability Vital Sign     Unable to Pay for Housing in the Last Year: No     Unstable Housing in the Last Year: No       Current medications Reviewed    Review of Systems   Constitutional:  Positive for fatigue.   HENT:  Negative for nosebleeds.    Eyes:  Negative for visual disturbance.   Respiratory:  Negative for shortness of breath.    Cardiovascular:  Negative for chest pain and leg swelling.   Gastrointestinal:  Negative for nausea.   Musculoskeletal:  Negative for gait problem.   Skin:  Negative for color change.   Neurological:  Negative for dizziness.   Hematological:  Does not bruise/bleed easily.   Psychiatric/Behavioral:  Negative for sleep disturbance.      Objective:   Physical Exam  Constitutional:       General: She is not in acute distress.  HENT:      Head: Normocephalic and atraumatic.   Eyes:      Pupils: Pupils are equal, round, and reactive to light.   Cardiovascular:      Rate and Rhythm: Normal rate.   Pulmonary:      Effort: Pulmonary effort is normal. No respiratory distress.   Musculoskeletal:         General: Normal range of motion.      Cervical back: Normal range of motion.   Skin:     Coloration: Skin is not pale.   Neurological:      General: No focal deficit present.      Mental Status: She is alert.   Psychiatric:         Mood and Affect: Mood normal.         Behavior: Behavior normal.         Diagnostic Results: reviewed   PACO 1/25/2024:  Ejection fraction 35-40%  Left atrium is severely dilated   Right atrium is mildly dilated   Mitral valve has myomatous changes and severe mitral regurgitation     TTE 3/11/24       Left Ventricle: The left ventricle is moderately dilated. Normal wall thickness. There is eccentric hypertrophy. There is mildly reduced systolic function. Ejection fraction by visual  approximation is 40%.    Right Ventricle: Normal right ventricular cavity size. Wall thickness is normal. Right ventricle wall motion  is normal. Systolic function is normal.    Left Atrium: Left atrium is dilated. The left atrium volume index MOD is 50.5 mL/m2.    Mitral Valve: There is modearte to moderate/severe regurgitation.  Based on images of mitral valve structure, MR seems more functional as opposed to degenerative, but can not rule out degenerative based on these images    Tricuspid Valve: There is mild regurgitation.    Pulmonary Artery: The estimated pulmonary artery systolic pressure is 36 mmHg.    IVC/SVC: Normal venous pressure at 3 mmHg.     There is modearte to moderate/severe regurgitation. Based on images of mitral valve structure, MR seems more functional as opposed to degenerative, but can not rule out degenerative based on these images.     [facility] clean   Assessment:   MR  Plan:   Pre-op mitral repair and Left atrial appendage ligation. Desires mechanical valve if repair not feasible.       CTS Attending Note:    I have personally taken the history and examined this patient and agree with the REINA's note as stated above.  68-year-old woman with severe mitral regurgitation.  We plan mitral valve repair.  We also plan to resect her left atrial appendage.  Her questions have been answered, and she wishes to proceed.

## 2024-03-14 ENCOUNTER — HOSPITAL ENCOUNTER (OUTPATIENT)
Dept: PULMONOLOGY | Facility: CLINIC | Age: 69
Discharge: HOME | DRG: 220 | End: 2024-03-14
Payer: MEDICARE

## 2024-03-14 ENCOUNTER — OFFICE VISIT (OUTPATIENT)
Dept: CARDIOTHORACIC SURGERY | Facility: CLINIC | Age: 69
DRG: 220 | End: 2024-03-14
Payer: MEDICARE

## 2024-03-14 ENCOUNTER — TELEPHONE (OUTPATIENT)
Dept: CARDIOTHORACIC SURGERY | Facility: CLINIC | Age: 69
End: 2024-03-14
Payer: MEDICARE

## 2024-03-14 ENCOUNTER — HOSPITAL ENCOUNTER (OUTPATIENT)
Dept: RADIOLOGY | Facility: HOSPITAL | Age: 69
Discharge: HOME | DRG: 220 | End: 2024-03-14
Attending: THORACIC SURGERY (CARDIOTHORACIC VASCULAR SURGERY)
Payer: MEDICARE

## 2024-03-14 ENCOUNTER — HOSPITAL ENCOUNTER (OUTPATIENT)
Dept: CARDIOLOGY | Facility: CLINIC | Age: 69
Discharge: HOME | DRG: 220 | End: 2024-03-14
Payer: MEDICARE

## 2024-03-14 VITALS
OXYGEN SATURATION: 99 % | HEIGHT: 66 IN | HEART RATE: 89 BPM | BODY MASS INDEX: 35.63 KG/M2 | WEIGHT: 221.69 LBS | DIASTOLIC BLOOD PRESSURE: 79 MMHG | SYSTOLIC BLOOD PRESSURE: 171 MMHG

## 2024-03-14 DIAGNOSIS — Z01.818 PRE-OP TESTING: ICD-10-CM

## 2024-03-14 DIAGNOSIS — I10 HYPERTENSION, UNSPECIFIED TYPE: ICD-10-CM

## 2024-03-14 DIAGNOSIS — I34.0 NONRHEUMATIC MITRAL VALVE REGURGITATION: ICD-10-CM

## 2024-03-14 DIAGNOSIS — I50.20 HEART FAILURE WITH REDUCED EJECTION FRACTION: ICD-10-CM

## 2024-03-14 DIAGNOSIS — E78.5 HYPERLIPIDEMIA, UNSPECIFIED HYPERLIPIDEMIA TYPE: ICD-10-CM

## 2024-03-14 DIAGNOSIS — I34.0 NONRHEUMATIC MITRAL VALVE REGURGITATION: Primary | ICD-10-CM

## 2024-03-14 DIAGNOSIS — K21.9 GASTROESOPHAGEAL REFLUX DISEASE, UNSPECIFIED WHETHER ESOPHAGITIS PRESENT: ICD-10-CM

## 2024-03-14 DIAGNOSIS — E03.9 HYPOTHYROIDISM, UNSPECIFIED TYPE: ICD-10-CM

## 2024-03-14 LAB
DLCO SINGLE BREATH LLN: 17.08
DLCO SINGLE BREATH PRE REF: 68.7 %
DLCO SINGLE BREATH REF: 22.82
DLCOC SBVA LLN: 2.97
DLCOC SBVA REF: 4.33
DLCOC SINGLE BREATH LLN: 17.08
DLCOC SINGLE BREATH REF: 22.82
DLCOCSBVAULN: 5.68
DLCOCSINGLEBREATHULN: 28.55
DLCOSINGLEBREATHULN: 28.55
DLCOVA LLN: 2.97
DLCOVA PRE REF: 82.8 %
DLCOVA PRE: 3.58 ML/(MIN*MMHG*L) (ref 2.97–5.68)
DLCOVA REF: 4.33
DLCOVAULN: 5.68
FEF 25 75 LLN: 0.95
FEF 25 75 PRE REF: 98.9 %
FEF 25 75 REF: 2.02
FEV05 LLN: 0.97
FEV05 REF: 1.83
FEV1 FVC LLN: 65
FEV1 FVC PRE REF: 99.2 %
FEV1 FVC REF: 78
FEV1 LLN: 1.77
FEV1 PRE REF: 89.8 %
FEV1 REF: 2.41
FVC LLN: 2.28
FVC PRE REF: 89.8 %
FVC REF: 3.11
IVC PRE: 2.78 L (ref 2.28–3.98)
IVC SINGLE BREATH LLN: 2.28
IVC SINGLE BREATH PRE REF: 89.5 %
IVC SINGLE BREATH REF: 3.11
IVCSINGLEBREATHULN: 3.98
MVV LLN: 79
MVV PRE REF: 68.1 %
MVV REF: 93
OHS QRS DURATION: 94 MS
OHS QTC CALCULATION: 459 MS
PEF LLN: 4.29
PEF PRE REF: 87.1 %
PEF REF: 6.1
PHYSICIAN COMMENT: ABNORMAL
PRE DLCO: 15.66 ML/(MIN*MMHG) (ref 17.08–28.55)
PRE FEF 25 75: 1.99 L/S (ref 0.95–3.52)
PRE FET 100: 6.47 SEC
PRE FEV05 REF: 97.7 %
PRE FEV1 FVC: 77.47 % (ref 65.06–89.25)
PRE FEV1: 2.16 L (ref 1.77–3.02)
PRE FEV5: 1.78 L (ref 0.97–2.68)
PRE FVC: 2.79 L (ref 2.28–3.98)
PRE MVV: 63.25 L/MIN (ref 78.94–106.8)
PRE PEF: 5.32 L/S (ref 4.29–7.92)
VA PRE: 4.37 L (ref 5.12–5.12)
VA SINGLE BREATH LLN: 5.12
VA SINGLE BREATH PRE REF: 85.4 %
VA SINGLE BREATH REF: 5.12
VASINGLEBREATHULN: 5.12

## 2024-03-14 PROCEDURE — 71046 X-RAY EXAM CHEST 2 VIEWS: CPT | Mod: 26,,, | Performed by: RADIOLOGY

## 2024-03-14 PROCEDURE — 99999 PR PBB SHADOW E&M-EST. PATIENT-LVL IV: CPT | Mod: PBBFAC,,, | Performed by: THORACIC SURGERY (CARDIOTHORACIC VASCULAR SURGERY)

## 2024-03-14 PROCEDURE — 93005 ELECTROCARDIOGRAM TRACING: CPT | Mod: PBBFAC | Performed by: INTERNAL MEDICINE

## 2024-03-14 PROCEDURE — 94010 BREATHING CAPACITY TEST: CPT | Mod: 26,S$PBB,, | Performed by: INTERNAL MEDICINE

## 2024-03-14 PROCEDURE — 99214 OFFICE O/P EST MOD 30 MIN: CPT | Mod: PBBFAC,25 | Performed by: THORACIC SURGERY (CARDIOTHORACIC VASCULAR SURGERY)

## 2024-03-14 PROCEDURE — 71046 X-RAY EXAM CHEST 2 VIEWS: CPT | Mod: TC

## 2024-03-14 PROCEDURE — 99499 UNLISTED E&M SERVICE: CPT | Mod: S$PBB,,, | Performed by: THORACIC SURGERY (CARDIOTHORACIC VASCULAR SURGERY)

## 2024-03-14 PROCEDURE — 94729 DIFFUSING CAPACITY: CPT | Mod: PBBFAC | Performed by: INTERNAL MEDICINE

## 2024-03-14 PROCEDURE — 94010 BREATHING CAPACITY TEST: CPT | Mod: PBBFAC | Performed by: INTERNAL MEDICINE

## 2024-03-14 PROCEDURE — 93010 ELECTROCARDIOGRAM REPORT: CPT | Mod: S$PBB,,, | Performed by: INTERNAL MEDICINE

## 2024-03-14 PROCEDURE — 94729 DIFFUSING CAPACITY: CPT | Mod: 26,S$PBB,, | Performed by: INTERNAL MEDICINE

## 2024-03-14 RX ORDER — OXYCODONE HYDROCHLORIDE 5 MG/1
5 TABLET ORAL EVERY 4 HOURS PRN
Status: CANCELLED | OUTPATIENT
Start: 2024-03-14

## 2024-03-14 RX ORDER — ASPIRIN 300 MG/1
300 SUPPOSITORY RECTAL ONCE AS NEEDED
Status: CANCELLED | OUTPATIENT
Start: 2024-03-14 | End: 2035-08-11

## 2024-03-14 RX ORDER — CEFAZOLIN SODIUM 2 G/50ML
2 SOLUTION INTRAVENOUS
Status: CANCELLED | OUTPATIENT
Start: 2024-03-14

## 2024-03-14 RX ORDER — DOCUSATE SODIUM 100 MG/1
100 CAPSULE, LIQUID FILLED ORAL 2 TIMES DAILY
Status: CANCELLED | OUTPATIENT
Start: 2024-03-14

## 2024-03-14 RX ORDER — FENTANYL CITRATE 50 UG/ML
25 INJECTION, SOLUTION INTRAMUSCULAR; INTRAVENOUS
Status: CANCELLED | OUTPATIENT
Start: 2024-03-14 | End: 2024-03-16

## 2024-03-14 RX ORDER — ONDANSETRON HYDROCHLORIDE 2 MG/ML
4 INJECTION, SOLUTION INTRAVENOUS EVERY 12 HOURS PRN
Status: CANCELLED | OUTPATIENT
Start: 2024-03-14

## 2024-03-14 RX ORDER — MAGNESIUM SULFATE HEPTAHYDRATE 40 MG/ML
2 INJECTION, SOLUTION INTRAVENOUS
Status: CANCELLED | OUTPATIENT
Start: 2024-03-14

## 2024-03-14 RX ORDER — NAPROXEN SODIUM 220 MG/1
81 TABLET, FILM COATED ORAL ONCE
Status: CANCELLED | OUTPATIENT
Start: 2024-03-14 | End: 2024-03-14

## 2024-03-14 RX ORDER — OXYCODONE HYDROCHLORIDE 10 MG/1
10 TABLET ORAL EVERY 4 HOURS PRN
Status: CANCELLED | OUTPATIENT
Start: 2024-03-14

## 2024-03-14 RX ORDER — PROPOFOL 10 MG/ML
0-50 INJECTION, EMULSION INTRAVENOUS CONTINUOUS
Status: CANCELLED | OUTPATIENT
Start: 2024-03-14

## 2024-03-14 RX ORDER — DEXTROSE MONOHYDRATE, SODIUM CHLORIDE, AND POTASSIUM CHLORIDE 50; 1.49; 4.5 G/1000ML; G/1000ML; G/1000ML
INJECTION, SOLUTION INTRAVENOUS CONTINUOUS
Status: CANCELLED | OUTPATIENT
Start: 2024-03-14

## 2024-03-14 RX ORDER — FENTANYL CITRATE 50 UG/ML
25 INJECTION, SOLUTION INTRAMUSCULAR; INTRAVENOUS
Status: CANCELLED | OUTPATIENT
Start: 2024-03-14

## 2024-03-14 RX ORDER — ACETAMINOPHEN 325 MG/1
650 TABLET ORAL EVERY 4 HOURS PRN
Status: CANCELLED | OUTPATIENT
Start: 2024-03-14

## 2024-03-14 RX ORDER — FENTANYL CITRATE 50 UG/ML
50 INJECTION, SOLUTION INTRAMUSCULAR; INTRAVENOUS
Status: CANCELLED | OUTPATIENT
Start: 2024-03-17

## 2024-03-14 RX ORDER — MAGNESIUM SULFATE/D5W 2 G/50 ML
4 INTRAVENOUS SOLUTION, PIGGYBACK (ML) INTRAVENOUS
Status: CANCELLED | OUTPATIENT
Start: 2024-03-14

## 2024-03-14 RX ORDER — POTASSIUM CHLORIDE 29.8 MG/ML
40 INJECTION INTRAVENOUS
Status: CANCELLED | OUTPATIENT
Start: 2024-03-14

## 2024-03-14 RX ORDER — BISACODYL 10 MG/1
10 SUPPOSITORY RECTAL DAILY PRN
Status: CANCELLED | OUTPATIENT
Start: 2024-03-14

## 2024-03-14 RX ORDER — METOCLOPRAMIDE HYDROCHLORIDE 5 MG/ML
5 INJECTION INTRAMUSCULAR; INTRAVENOUS EVERY 6 HOURS PRN
Status: CANCELLED | OUTPATIENT
Start: 2024-03-14

## 2024-03-14 RX ORDER — SODIUM CHLORIDE 0.9 % (FLUSH) 0.9 %
10 SYRINGE (ML) INJECTION
Status: CANCELLED | OUTPATIENT
Start: 2024-03-14

## 2024-03-14 RX ORDER — ALBUMIN HUMAN 50 G/1000ML
25 SOLUTION INTRAVENOUS ONCE AS NEEDED
Status: CANCELLED | OUTPATIENT
Start: 2024-03-14 | End: 2035-08-11

## 2024-03-14 RX ORDER — POTASSIUM CHLORIDE 20 MEQ/1
20 TABLET, EXTENDED RELEASE ORAL EVERY 12 HOURS
Status: CANCELLED | OUTPATIENT
Start: 2024-03-14

## 2024-03-14 RX ORDER — LIDOCAINE HYDROCHLORIDE 10 MG/ML
1 INJECTION, SOLUTION EPIDURAL; INFILTRATION; INTRACAUDAL; PERINEURAL
Status: CANCELLED | OUTPATIENT
Start: 2024-03-14

## 2024-03-14 RX ORDER — METOPROLOL TARTRATE 25 MG/1
25 TABLET, FILM COATED ORAL
Status: CANCELLED | OUTPATIENT
Start: 2024-03-14

## 2024-03-14 RX ORDER — POLYETHYLENE GLYCOL 3350 17 G/17G
17 POWDER, FOR SOLUTION ORAL DAILY
Status: CANCELLED | OUTPATIENT
Start: 2024-03-15

## 2024-03-14 RX ORDER — FAMOTIDINE 20 MG/1
20 TABLET, FILM COATED ORAL 2 TIMES DAILY
Status: CANCELLED | OUTPATIENT
Start: 2024-03-14

## 2024-03-14 RX ORDER — SODIUM CHLORIDE 9 MG/ML
INJECTION, SOLUTION INTRAVENOUS CONTINUOUS
Status: CANCELLED | OUTPATIENT
Start: 2024-03-14

## 2024-03-14 RX ORDER — NAPROXEN SODIUM 220 MG/1
81 TABLET, FILM COATED ORAL DAILY
Status: CANCELLED | OUTPATIENT
Start: 2024-03-15

## 2024-03-14 RX ORDER — ATORVASTATIN CALCIUM 40 MG/1
40 TABLET, FILM COATED ORAL NIGHTLY
Status: CANCELLED | OUTPATIENT
Start: 2024-03-14

## 2024-03-14 RX ORDER — POTASSIUM CHLORIDE 14.9 MG/ML
60 INJECTION INTRAVENOUS
Status: CANCELLED | OUTPATIENT
Start: 2024-03-14

## 2024-03-14 RX ORDER — CEFAZOLIN SODIUM 2 G/50ML
2 SOLUTION INTRAVENOUS
Status: CANCELLED | OUTPATIENT
Start: 2024-03-14 | End: 2024-03-16

## 2024-03-14 RX ORDER — FAMOTIDINE 10 MG/ML
20 INJECTION INTRAVENOUS 2 TIMES DAILY
Status: CANCELLED | OUTPATIENT
Start: 2024-03-14

## 2024-03-14 RX ORDER — IPRATROPIUM BROMIDE AND ALBUTEROL SULFATE 2.5; .5 MG/3ML; MG/3ML
3 SOLUTION RESPIRATORY (INHALATION) EVERY 4 HOURS
Status: CANCELLED | OUTPATIENT
Start: 2024-03-14 | End: 2024-03-15

## 2024-03-14 RX ORDER — MUPIROCIN 20 MG/G
1 OINTMENT TOPICAL 2 TIMES DAILY
Status: CANCELLED | OUTPATIENT
Start: 2024-03-14 | End: 2024-03-19

## 2024-03-14 RX ORDER — IPRATROPIUM BROMIDE AND ALBUTEROL SULFATE 2.5; .5 MG/3ML; MG/3ML
3 SOLUTION RESPIRATORY (INHALATION) EVERY 4 HOURS PRN
Status: CANCELLED | OUTPATIENT
Start: 2024-03-14 | End: 2024-03-15

## 2024-03-14 RX ORDER — POTASSIUM CHLORIDE 14.9 MG/ML
20 INJECTION INTRAVENOUS
Status: CANCELLED | OUTPATIENT
Start: 2024-03-14

## 2024-03-14 RX ORDER — ASPIRIN 325 MG
325 TABLET, DELAYED RELEASE (ENTERIC COATED) ORAL DAILY
Status: CANCELLED | OUTPATIENT
Start: 2024-03-15

## 2024-03-14 RX ORDER — MUPIROCIN 20 MG/G
1 OINTMENT TOPICAL
Status: CANCELLED | OUTPATIENT
Start: 2024-03-14

## 2024-03-14 NOTE — ANESTHESIA PREPROCEDURE EVALUATION
[facility]  Anesthesia Pre-Operative Evaluation         Patient Name: Mora Adrian  YOB: 1955  MRN: [id number]    SUBJECTIVE:     Pre-operative evaluation for Procedure(s) (LRB):  REPAIR, MITRAL VALVE, OPEN (N/A)  EXCLUSION, LEFT ATRIAL APPENDAGE, OPEN, AS PART OF OPEN CHEST SURGERY (N/A)  REPLACEMENT, MITRAL VALVE (N/A)     03/14/2024    Mora Adrian is a 68 y.o. female w/ a significant PMHx of HTN, GERD, hypoythyroidism, HFrEF (EF 40%). She was preparing for a knee operation which prompted cardiac clearance. Was found to have severe MR. >4 METS. Asymptomatic.    LHC without any significant lesions.     TTE 3/11/24:    Left Ventricle: The left ventricle is moderately dilated. Normal wall thickness. There is eccentric hypertrophy. There is mildly reduced systolic function. Ejection fraction by visual approximation is 40%.    Right Ventricle: Normal right ventricular cavity size. Wall thickness is normal. Right ventricle wall motion  is normal. Systolic function is normal.    Left Atrium: Left atrium is dilated. The left atrium volume index MOD is 50.5 mL/m2.    Mitral Valve: There is modearte to moderate/severe regurgitation.  Based on images of mitral valve structure, MR seems more functional as opposed to degenerative, but can not rule out degenerative based on these images    Tricuspid Valve: There is mild regurgitation.    Pulmonary Artery: The estimated pulmonary artery systolic pressure is 36 mmHg.    IVC/SVC: Normal venous pressure at 3 mmHg.     There is modearte to moderate/severe regurgitation. Based on images of mitral valve structure, MR seems more functional as opposed to degenerative, but can not rule out degenerative based on these images.         Patient now presents for the above procedure(s).      LDA: None documented.       Prev airway: None documented.    Drips: None documented.      Patient Active Problem List   Diagnosis    Nonrheumatic mitral valve  regurgitation    Heart failure with reduced ejection fraction    GERD (gastroesophageal reflux disease)    HTN (hypertension)    HLD (hyperlipidemia)    Hypothyroidism       Review of patient's allergies indicates:  No Known Allergies    Current Inpatient Medications:      No current facility-administered medications on file prior to encounter.     Current Outpatient Medications on File Prior to Encounter   Medication Sig Dispense Refill    benazepril-hydrochlorthiazide (LOTENSIN HCT) 20-12.5 mg per tablet Take 1 tablet by mouth every morning.      benazepril-hydrochlorthiazide (LOTENSIN HCT) 20-25 mg Tab       brimonidine 0.2% (ALPHAGAN) 0.2 % Drop Place 1 drop into the right eye 2 (two) times daily.      cyanocobalamin 1,000 mcg/mL injection 1,000 mcg every 30 days.      estradiol 0.05 mg/24 hr td ptsw (VIVELLE-DOT) 0.05 mg/24 hr 1 patch.      ezetimibe (ZETIA) 10 mg tablet Take 10 mg by mouth once daily.      levothyroxine (SYNTHROID) 150 MCG tablet Take 150 mcg by mouth before breakfast.      levothyroxine (SYNTHROID) 25 MCG tablet       metoprolol succinate (TOPROL-XL) 25 MG 24 hr tablet Take 1 tablet by mouth once daily.      pantoprazole (PROTONIX) 40 MG tablet Take 40 mg by mouth once daily.      vitamin D (VITAMIN D3) 1000 units Tab Take 1,000 Units by mouth once daily.      zinc gluconate 50 mg tablet Take 50 mg by mouth once daily.         Past Surgical History:   Procedure Laterality Date    APPENDECTOMY      BREAST CYST ASPIRATION       SECTION, CLASSIC      EYE SURGERY Bilateral     cataracts w/iol    GALLBLADDER SURGERY      GASTRIC BYPASS      HYSTERECTOMY      TOTAL KNEE ARTHROPLASTY Right     TRANSESOPHAGEAL ECHOCARDIOGRAPHY N/A 2024    Procedure: ECHOCARDIOGRAM, TRANSESOPHAGEAL;  Surgeon: Bobby Card III, MD;  Location: [facility];  Service: Cardiology;  Laterality: N/A;       Social History     Socioeconomic History    Marital status:    Tobacco Use    Smoking status:  Never    Smokeless tobacco: Never   Substance and Sexual Activity    Alcohol use: Yes     Alcohol/week: 10.0 standard drinks of alcohol     Types: 10 Glasses of wine per week    Drug use: Never     Social Determinants of Health     Financial Resource Strain: Low Risk  (2/19/2024)    Overall Financial Resource Strain (CARDIA)     Difficulty of Paying Living Expenses: Not hard at all   Food Insecurity: No Food Insecurity (2/19/2024)    Hunger Vital Sign     Worried About Running Out of Food in the Last Year: Never true     Ran Out of Food in the Last Year: Never true   Transportation Needs: No Transportation Needs (2/19/2024)    PRAPARE - Transportation     Lack of Transportation (Medical): No     Lack of Transportation (Non-Medical): No   Physical Activity: Unknown (2/19/2024)    Exercise Vital Sign     Days of Exercise per Week: 0 days   Stress: No Stress Concern Present (2/19/2024)    [employer] [place] [employer] - Occupational Stress Questionnaire     Feeling of Stress : Only a little   Social Connections: Unknown (2/19/2024)    Social Connection and Isolation Panel [NHANES]     Frequency of Communication with Friends and Family: More than three times a week     Frequency of Social Gatherings with Friends and Family: More than three times a week     Active Member of Clubs or Organizations: Yes     Attends Club or Organization Meetings: More than 4 times per year     Marital Status:    Housing Stability: Unknown (2/19/2024)    Housing Stability Vital Sign     Unable to Pay for Housing in the Last Year: No     Unstable Housing in the Last Year: No       OBJECTIVE:     Vital Signs Range (Last 24H):  Pulse:  [89]   BP: (171)/(79)   SpO2:  [99 %]       Significant Labs:  Lab Results   Component Value Date    WBC 5.37 03/14/2024    HGB 11.7 (L) 03/14/2024    HCT 36.0 (L) 03/14/2024     03/14/2024    CHOL 235 (H) 10/02/2023    TRIG 59 10/02/2023     10/02/2023    ALT 25 03/14/2024    AST  35 03/14/2024     (L) 03/14/2024    K 4.1 03/14/2024     03/14/2024    CREATININE 1.1 03/14/2024    BUN 24 (H) 03/14/2024    CO2 23 03/14/2024    TSH 1.980 09/28/2023    INR 1.0 03/14/2024    HGBA1C 5.0 03/14/2024       Diagnostic Studies: No relevant studies.    EKG:   Results for orders placed or performed during the hospital encounter of 03/14/24   EKG 12-lead    Collection Time: 03/14/24  1:28 PM   Result Value Ref Range    QRS Duration 94 ms    [facility] QTC Calculation 459 ms    Narrative    Test Reason : I34.0,Z01.818,    Vent. Rate : 095 BPM     Atrial Rate : 095 BPM     P-R Int : 164 ms          QRS Dur : 094 ms      QT Int : 366 ms       P-R-T Axes : 065 -04 076 degrees     QTc Int : 459 ms    Sinus rhythm with frequent Premature ventricular complexes  Possible Left atrial enlargement  Borderline Abnormal ECG  No previous ECGs available  Confirmed by Peggy Garcia MD ([id number]) on 3/14/2024 1:57:09 PM    Referred By: SAPPHIRE HUSSEIN           Confirmed By:Peggy Garcia MD       2D ECHO:  TTE:  No results found for this or any previous visit.    PACO:  No results found for this or any previous visit.    ASSESSMENT/PLAN:         Pre-op Assessment    I have reviewed the Patient Summary Reports.     I have reviewed the Nursing Notes. I have reviewed the NPO Status.   I have reviewed the Medications.     Review of Systems  Anesthesia Hx:  No problems with previous Anesthesia   Neg history of prior surgery.          Denies Family Hx of Anesthesia complications.    Denies Personal Hx of Anesthesia complications.                    Social:  Social Alcohol Use       Hematology/Oncology:    Oncology Normal    -- Anemia:                                  EENT/Dental:  EENT/Dental Normal           Cardiovascular:     Hypertension Valvular problems/Murmurs, MR                                 Hypertension         Pulmonary:  Pulmonary Normal                       Renal/:  Renal/ Normal                  Hepatic/GI:     GERD      Gerd          Musculoskeletal:  Musculoskeletal Normal                Neurological:  Neurology Normal                                      Endocrine:   Hypothyroidism       Hypothyroidism          Psych:  Psychiatric Normal                    Physical Exam  General: Well nourished, Cooperative, Alert and Oriented    Airway:  Mallampati: II   Mouth Opening: Normal  TM Distance: Normal  Tongue: Normal  Neck ROM: Normal ROM    Dental:  Intact        Anesthesia Plan  Type of Anesthesia, risks & benefits discussed:    Anesthesia Type: Gen ETT  Intra-op Monitoring Plan: Standard ASA Monitors, Art Line, Central Line and PACO  Post Op Pain Control Plan: multimodal analgesia, IV/PO Opioids PRN and peripheral nerve block  Induction:  IV  Airway Plan: Direct and Video, Post-Induction  Informed Consent: Informed consent signed with the Patient and all parties understand the risks and agree with anesthesia plan.  All questions answered.   ASA Score: 4  Day of Surgery Review of History & Physical: H&P Update referred to the surgeon/provider.    Ready For Surgery From Anesthesia Perspective.     .

## 2024-03-14 NOTE — TELEPHONE ENCOUNTER
Informed pt of arrival time for surgery.  Pt instructed to report to [facility] at 5:00 tomorrow morning.  Pt reminded to perform shower with antibacterial soap tonight and tomorrow morning, and to become NPO at midnight.  Pt verbalized understanding.

## 2024-03-14 NOTE — PATIENT INSTRUCTIONS
"Pt verbally instructed and written instructions given for surgery.      PREPARING FOR SURGERY    Your surgery has been scheduled for:    Day: Friday   Date:  March 15    Arrival Time: 0500    Start Time: 0700    You should report to the second floor surgery center, located on the [facility] side of the second floor of the [facility]. The phone number is [phone].         PLEASE NOTE    If you are allergic to any medications, please inform your doctor or the nurse responsible for your care.  Tell the doctor if you take aspirin, products containing aspirin, herbal medications or blood thinners, such as Coumadin, Pradaxa, or Plavix.  Notify your doctor if you are diabetic and provide information about the medications you take.  Arrange for someone to drive you home following surgery. You will not be allowed to leave the surgical facility alone or drive yourself home following sedation and anesthesia.  If you have not already done so, please bring a list of your medications with you the day of your surgery.          THE NIGHT BEFORE SURGERY    Eat a light supper on the night before your surgery, no greasy or fatty foods.    DO NOT EAT OR DRINK ANYTHING AFTER MIDNIGHT, INCLUDING GUM, HARD CANDY, MINTS, OR CHEWING TOBACCO    Take a complete shower. Wash your body from the neck down with Hibiclens (chlorhexidine gluconate) soap. Hibiclens soap may be purchased over the counter at the pharmacy. Keep the soap away from your eyes, ears, and mouth. After washing with Hibiclens, rinse thoroughly. You may also use any soap labeled "antibacterial". Shampoo your hair with your regular shampoo.         THE DAY OF SURGERY    Take another shower with Hibiclens or any antibacterial soap, to reduce the change of infection.    Medications to take the morning of surgery: levothyroxine and metoprolol with a small sip of water.     Diabetic medication instructions: N/A.    You may brush your teeth and rinse your mouth, " but do not swallow any water.    Do not apply perfume, powder, body lotions or deodorant on the day of surgery.    Do not wear any makeup, including mascara and false eyelashes.    Nail polish should be removed.    Wear comfortable clothes, such as button front shirt and loose-fitting pants.    Leave all jewelry, including body piercings and valuables at home.    Hairpins and clasps must be removed before you enter the operating room.    You may wear glasses, dentures and hearing aids before and after surgery. They may need to be removed before going into the operating room. Contact lenses worn before surgery must be removed before entering the operating room. Please bring a case for your hearing aids, glasses and/or contacts.    Bring any devices you will need after surgery such as crutches or canes.    If you have sleep apnea, please bring your CPAP machine.    If you have an implantable device, such as a pacemaker or AICD, please bring the device information card, if you have one.       If you have any questions or concerns, please don't hesitate to call.

## 2024-03-15 ENCOUNTER — HOSPITAL ENCOUNTER (INPATIENT)
Facility: HOSPITAL | Age: 69
LOS: 10 days | Discharge: HOME HEALTH | DRG: 220 | End: 2024-03-25
Attending: THORACIC SURGERY (CARDIOTHORACIC VASCULAR SURGERY) | Admitting: THORACIC SURGERY (CARDIOTHORACIC VASCULAR SURGERY)
Payer: MEDICARE

## 2024-03-15 ENCOUNTER — ANESTHESIA (OUTPATIENT)
Dept: SURGERY | Facility: HOSPITAL | Age: 69
DRG: 220 | End: 2024-03-15
Payer: MEDICARE

## 2024-03-15 DIAGNOSIS — Z98.890 S/P MITRAL VALVE REPAIR: Primary | ICD-10-CM

## 2024-03-15 DIAGNOSIS — I34.0 NONRHEUMATIC MITRAL VALVE REGURGITATION: ICD-10-CM

## 2024-03-15 DIAGNOSIS — I49.9 ABNORMAL HEART RHYTHM: ICD-10-CM

## 2024-03-15 DIAGNOSIS — Z98.890 HISTORY OF HEART SURGERY: ICD-10-CM

## 2024-03-15 DIAGNOSIS — Z98.890 S/P MVR (MITRAL VALVE REPAIR): Primary | ICD-10-CM

## 2024-03-15 DIAGNOSIS — I49.3 PVC (PREMATURE VENTRICULAR CONTRACTION): ICD-10-CM

## 2024-03-15 DIAGNOSIS — I49.9 ARRHYTHMIA: ICD-10-CM

## 2024-03-15 PROBLEM — T14.8XXA SURGICAL WOUND PRESENT: Status: ACTIVE | Noted: 2024-03-15

## 2024-03-15 PROBLEM — R73.9 TRANSIENT HYPERGLYCEMIA POST PROCEDURE: Status: ACTIVE | Noted: 2024-03-15

## 2024-03-15 LAB
ALBUMIN SERPL BCP-MCNC: 3 G/DL (ref 3.5–5.2)
ALBUMIN SERPL BCP-MCNC: 3 G/DL (ref 3.5–5.2)
ALLENS TEST: ABNORMAL
ALLENS TEST: NORMAL
ALP SERPL-CCNC: 65 U/L (ref 55–135)
ALP SERPL-CCNC: 65 U/L (ref 55–135)
ALT SERPL W/O P-5'-P-CCNC: 22 U/L (ref 10–44)
ALT SERPL W/O P-5'-P-CCNC: 22 U/L (ref 10–44)
ANION GAP SERPL CALC-SCNC: 13 MMOL/L (ref 8–16)
ANION GAP SERPL CALC-SCNC: 13 MMOL/L (ref 8–16)
APTT PPP: 28.5 SEC (ref 21–32)
APTT PPP: 28.5 SEC (ref 21–32)
AST SERPL-CCNC: 54 U/L (ref 10–40)
AST SERPL-CCNC: 54 U/L (ref 10–40)
BASOPHILS # BLD AUTO: 0.04 K/UL (ref 0–0.2)
BASOPHILS # BLD AUTO: 0.04 K/UL (ref 0–0.2)
BASOPHILS NFR BLD: 0.3 % (ref 0–1.9)
BASOPHILS NFR BLD: 0.3 % (ref 0–1.9)
BILIRUB SERPL-MCNC: 0.5 MG/DL (ref 0.1–1)
BILIRUB SERPL-MCNC: 0.5 MG/DL (ref 0.1–1)
BUN SERPL-MCNC: 17 MG/DL (ref 8–23)
BUN SERPL-MCNC: 17 MG/DL (ref 8–23)
CA-I BLDV-SCNC: 1.29 MMOL/L (ref 1.06–1.42)
CALCIUM SERPL-MCNC: 9.5 MG/DL (ref 8.7–10.5)
CALCIUM SERPL-MCNC: 9.5 MG/DL (ref 8.7–10.5)
CHLORIDE SERPL-SCNC: 109 MMOL/L (ref 95–110)
CHLORIDE SERPL-SCNC: 109 MMOL/L (ref 95–110)
CO2 SERPL-SCNC: 14 MMOL/L (ref 23–29)
CO2 SERPL-SCNC: 14 MMOL/L (ref 23–29)
CREAT SERPL-MCNC: 0.8 MG/DL (ref 0.5–1.4)
CREAT SERPL-MCNC: 0.8 MG/DL (ref 0.5–1.4)
DELSYS: ABNORMAL
DELSYS: NORMAL
DIFFERENTIAL METHOD BLD: ABNORMAL
DIFFERENTIAL METHOD BLD: ABNORMAL
EOSINOPHIL # BLD AUTO: 0.1 K/UL (ref 0–0.5)
EOSINOPHIL # BLD AUTO: 0.1 K/UL (ref 0–0.5)
EOSINOPHIL NFR BLD: 0.3 % (ref 0–8)
EOSINOPHIL NFR BLD: 0.3 % (ref 0–8)
ERYTHROCYTE [DISTWIDTH] IN BLOOD BY AUTOMATED COUNT: 13.4 % (ref 11.5–14.5)
ERYTHROCYTE [DISTWIDTH] IN BLOOD BY AUTOMATED COUNT: 13.4 % (ref 11.5–14.5)
ERYTHROCYTE [SEDIMENTATION RATE] IN BLOOD BY WESTERGREN METHOD: 16 MM/H
ERYTHROCYTE [SEDIMENTATION RATE] IN BLOOD BY WESTERGREN METHOD: 16 MM/H
EST. GFR  (NO RACE VARIABLE): >60 ML/MIN/1.73 M^2
EST. GFR  (NO RACE VARIABLE): >60 ML/MIN/1.73 M^2
FIBRINOGEN PPP-MCNC: 158 MG/DL (ref 182–400)
FIBRINOGEN PPP-MCNC: 158 MG/DL (ref 182–400)
FIO2: 100
FIO2: 40
FIO2: 40
FIO2: 50
GLUCOSE SERPL-MCNC: 103 MG/DL (ref 70–110)
GLUCOSE SERPL-MCNC: 110 MG/DL (ref 70–110)
GLUCOSE SERPL-MCNC: 123 MG/DL (ref 70–110)
GLUCOSE SERPL-MCNC: 136 MG/DL (ref 70–110)
GLUCOSE SERPL-MCNC: 137 MG/DL (ref 70–110)
GLUCOSE SERPL-MCNC: 208 MG/DL (ref 70–110)
GLUCOSE SERPL-MCNC: 208 MG/DL (ref 70–110)
HCO3 UR-SCNC: 14.1 MMOL/L (ref 24–28)
HCO3 UR-SCNC: 15.8 MMOL/L (ref 24–28)
HCO3 UR-SCNC: 17.2 MMOL/L (ref 24–28)
HCO3 UR-SCNC: 20.4 MMOL/L (ref 24–28)
HCO3 UR-SCNC: 20.9 MMOL/L (ref 24–28)
HCO3 UR-SCNC: 21.3 MMOL/L (ref 24–28)
HCO3 UR-SCNC: 21.7 MMOL/L (ref 24–28)
HCO3 UR-SCNC: 22.2 MMOL/L (ref 24–28)
HCT VFR BLD AUTO: 35.2 % (ref 37–48.5)
HCT VFR BLD AUTO: 35.2 % (ref 37–48.5)
HCT VFR BLD CALC: 26 %PCV (ref 36–54)
HCT VFR BLD CALC: 27 %PCV (ref 36–54)
HCT VFR BLD CALC: 33 %PCV (ref 36–54)
HCT VFR BLD CALC: 35 %PCV (ref 36–54)
HCT VFR BLD CALC: 36 %PCV (ref 36–54)
HGB BLD-MCNC: 11.6 G/DL (ref 12–16)
HGB BLD-MCNC: 11.6 G/DL (ref 12–16)
IMM GRANULOCYTES # BLD AUTO: 0.1 K/UL (ref 0–0.04)
IMM GRANULOCYTES # BLD AUTO: 0.1 K/UL (ref 0–0.04)
IMM GRANULOCYTES NFR BLD AUTO: 0.7 % (ref 0–0.5)
IMM GRANULOCYTES NFR BLD AUTO: 0.7 % (ref 0–0.5)
INR PPP: 1.1 (ref 0.8–1.2)
INR PPP: 1.1 (ref 0.8–1.2)
LDH SERPL L TO P-CCNC: 0.32 MMOL/L (ref 0.36–1.25)
LDH SERPL L TO P-CCNC: 0.38 MMOL/L (ref 0.36–1.25)
LDH SERPL L TO P-CCNC: 0.85 MMOL/L (ref 0.36–1.25)
LDH SERPL L TO P-CCNC: 0.94 MMOL/L (ref 0.36–1.25)
LDH SERPL L TO P-CCNC: 1.14 MMOL/L (ref 0.36–1.25)
LDH SERPL L TO P-CCNC: 1.23 MMOL/L (ref 0.36–1.25)
LDH SERPL L TO P-CCNC: <0.3 MMOL/L (ref 0.36–1.25)
LYMPHOCYTES # BLD AUTO: 2.8 K/UL (ref 1–4.8)
LYMPHOCYTES # BLD AUTO: 2.8 K/UL (ref 1–4.8)
LYMPHOCYTES NFR BLD: 18.6 % (ref 18–48)
LYMPHOCYTES NFR BLD: 18.6 % (ref 18–48)
MAGNESIUM SERPL-MCNC: 2.4 MG/DL (ref 1.6–2.6)
MAGNESIUM SERPL-MCNC: 2.4 MG/DL (ref 1.6–2.6)
MCH RBC QN AUTO: 33.4 PG (ref 27–31)
MCH RBC QN AUTO: 33.4 PG (ref 27–31)
MCHC RBC AUTO-ENTMCNC: 33 G/DL (ref 32–36)
MCHC RBC AUTO-ENTMCNC: 33 G/DL (ref 32–36)
MCV RBC AUTO: 101 FL (ref 82–98)
MCV RBC AUTO: 101 FL (ref 82–98)
MODE: ABNORMAL
MODE: NORMAL
MONOCYTES # BLD AUTO: 0.3 K/UL (ref 0.3–1)
MONOCYTES # BLD AUTO: 0.3 K/UL (ref 0.3–1)
MONOCYTES NFR BLD: 2.1 % (ref 4–15)
MONOCYTES NFR BLD: 2.1 % (ref 4–15)
NEUTROPHILS # BLD AUTO: 11.9 K/UL (ref 1.8–7.7)
NEUTROPHILS # BLD AUTO: 11.9 K/UL (ref 1.8–7.7)
NEUTROPHILS NFR BLD: 78 % (ref 38–73)
NEUTROPHILS NFR BLD: 78 % (ref 38–73)
NRBC BLD-RTO: 0 /100 WBC
NRBC BLD-RTO: 0 /100 WBC
OHS QRS DURATION: 94 MS
OHS QTC CALCULATION: 503 MS
PCO2 BLDA: 31.4 MMHG (ref 35–45)
PCO2 BLDA: 31.9 MMHG (ref 35–45)
PCO2 BLDA: 32.3 MMHG (ref 35–45)
PCO2 BLDA: 33.9 MMHG (ref 35–45)
PCO2 BLDA: 34.4 MMHG (ref 35–45)
PCO2 BLDA: 35.1 MMHG (ref 35–45)
PCO2 BLDA: 35.3 MMHG (ref 35–45)
PCO2 BLDA: 36.6 MMHG (ref 35–45)
PEEP: 5
PH SMN: 7.26 [PH] (ref 7.35–7.45)
PH SMN: 7.26 [PH] (ref 7.35–7.45)
PH SMN: 7.28 [PH] (ref 7.35–7.45)
PH SMN: 7.4 [PH] (ref 7.35–7.45)
PH SMN: 7.41 [PH] (ref 7.35–7.45)
PH SMN: 7.41 [PH] (ref 7.35–7.45)
PH SMN: 7.42 [PH] (ref 7.35–7.45)
PH SMN: 7.42 [PH] (ref 7.35–7.45)
PHOSPHATE SERPL-MCNC: 3.1 MG/DL (ref 2.7–4.5)
PHOSPHATE SERPL-MCNC: 3.1 MG/DL (ref 2.7–4.5)
PLATELET # BLD AUTO: 178 K/UL (ref 150–450)
PLATELET # BLD AUTO: 178 K/UL (ref 150–450)
PMV BLD AUTO: 10.6 FL (ref 9.2–12.9)
PMV BLD AUTO: 10.6 FL (ref 9.2–12.9)
PO2 BLDA: 154 MMHG (ref 80–100)
PO2 BLDA: 168 MMHG (ref 80–100)
PO2 BLDA: 187 MMHG (ref 80–100)
PO2 BLDA: 189 MMHG (ref 80–100)
PO2 BLDA: 329 MMHG (ref 80–100)
PO2 BLDA: 47 MMHG (ref 40–60)
PO2 BLDA: 77 MMHG (ref 80–100)
PO2 BLDA: 89 MMHG (ref 80–100)
POC BE: -10 MMOL/L
POC BE: -11 MMOL/L
POC BE: -13 MMOL/L
POC BE: -2 MMOL/L
POC BE: -3 MMOL/L
POC BE: -3 MMOL/L
POC BE: -4 MMOL/L
POC BE: -4 MMOL/L
POC IONIZED CALCIUM: 1.06 MMOL/L (ref 1.06–1.42)
POC IONIZED CALCIUM: 1.1 MMOL/L (ref 1.06–1.42)
POC IONIZED CALCIUM: 1.12 MMOL/L (ref 1.06–1.42)
POC IONIZED CALCIUM: 1.13 MMOL/L (ref 1.06–1.42)
POC IONIZED CALCIUM: 1.15 MMOL/L (ref 1.06–1.42)
POC IONIZED CALCIUM: 1.34 MMOL/L (ref 1.06–1.42)
POC IONIZED CALCIUM: 1.36 MMOL/L (ref 1.06–1.42)
POC IONIZED CALCIUM: 1.37 MMOL/L (ref 1.06–1.42)
POC SATURATED O2: 100 % (ref 95–100)
POC SATURATED O2: 84 % (ref 95–100)
POC SATURATED O2: 93 % (ref 95–100)
POC SATURATED O2: 96 % (ref 95–100)
POC SATURATED O2: 99 % (ref 95–100)
POC TCO2: 15 MMOL/L (ref 23–27)
POC TCO2: 17 MMOL/L (ref 23–27)
POC TCO2: 18 MMOL/L (ref 23–27)
POC TCO2: 21 MMOL/L (ref 23–27)
POC TCO2: 22 MMOL/L (ref 23–27)
POC TCO2: 22 MMOL/L (ref 24–29)
POC TCO2: 23 MMOL/L (ref 23–27)
POC TCO2: 23 MMOL/L (ref 23–27)
POCT GLUCOSE: 129 MG/DL (ref 70–110)
POCT GLUCOSE: 139 MG/DL (ref 70–110)
POCT GLUCOSE: 142 MG/DL (ref 70–110)
POCT GLUCOSE: 144 MG/DL (ref 70–110)
POCT GLUCOSE: 154 MG/DL (ref 70–110)
POCT GLUCOSE: 180 MG/DL (ref 70–110)
POCT GLUCOSE: 193 MG/DL (ref 70–110)
POCT GLUCOSE: 206 MG/DL (ref 70–110)
POCT GLUCOSE: 224 MG/DL (ref 70–110)
POTASSIUM BLD-SCNC: 3.8 MMOL/L (ref 3.5–5.1)
POTASSIUM BLD-SCNC: 4 MMOL/L (ref 3.5–5.1)
POTASSIUM BLD-SCNC: 4.1 MMOL/L (ref 3.5–5.1)
POTASSIUM BLD-SCNC: 4.2 MMOL/L (ref 3.5–5.1)
POTASSIUM BLD-SCNC: 4.3 MMOL/L (ref 3.5–5.1)
POTASSIUM BLD-SCNC: 4.4 MMOL/L (ref 3.5–5.1)
POTASSIUM BLD-SCNC: 4.6 MMOL/L (ref 3.5–5.1)
POTASSIUM BLD-SCNC: 4.8 MMOL/L (ref 3.5–5.1)
POTASSIUM SERPL-SCNC: 4.1 MMOL/L (ref 3.5–5.1)
POTASSIUM SERPL-SCNC: 4.1 MMOL/L (ref 3.5–5.1)
PROT SERPL-MCNC: 5.5 G/DL (ref 6–8.4)
PROT SERPL-MCNC: 5.5 G/DL (ref 6–8.4)
PROTHROMBIN TIME: 12.2 SEC (ref 9–12.5)
PROTHROMBIN TIME: 12.2 SEC (ref 9–12.5)
PS: 5
PS: 5
RBC # BLD AUTO: 3.47 M/UL (ref 4–5.4)
RBC # BLD AUTO: 3.47 M/UL (ref 4–5.4)
SAMPLE: ABNORMAL
SAMPLE: NORMAL
SITE: ABNORMAL
SITE: NORMAL
SODIUM BLD-SCNC: 135 MMOL/L (ref 136–145)
SODIUM BLD-SCNC: 136 MMOL/L (ref 136–145)
SODIUM BLD-SCNC: 136 MMOL/L (ref 136–145)
SODIUM BLD-SCNC: 137 MMOL/L (ref 136–145)
SODIUM BLD-SCNC: 138 MMOL/L (ref 136–145)
SODIUM BLD-SCNC: 138 MMOL/L (ref 136–145)
SODIUM SERPL-SCNC: 136 MMOL/L (ref 136–145)
SODIUM SERPL-SCNC: 136 MMOL/L (ref 136–145)
SP02: 96
SP02: 96
SPONT RATE: 16
SPONT RATE: 16
VT: 360
VT: 360
WBC # BLD AUTO: 15.26 K/UL (ref 3.9–12.7)
WBC # BLD AUTO: 15.26 K/UL (ref 3.9–12.7)

## 2024-03-15 PROCEDURE — 83605 ASSAY OF LACTIC ACID: CPT

## 2024-03-15 PROCEDURE — 99900017 HC EXTUBATION W/PARAMETERS (STAT)

## 2024-03-15 PROCEDURE — 99900031 HC PATIENT EDUCATION (STAT)

## 2024-03-15 PROCEDURE — 27100026 HC SHUNT SENSOR, TERUMO

## 2024-03-15 PROCEDURE — 27201037 HC PRESSURE MONITORING SET UP

## 2024-03-15 PROCEDURE — 36000713 HC OR TIME LEV V EA ADD 15 MIN: Performed by: THORACIC SURGERY (CARDIOTHORACIC VASCULAR SURGERY)

## 2024-03-15 PROCEDURE — 85384 FIBRINOGEN ACTIVITY: CPT | Performed by: STUDENT IN AN ORGANIZED HEALTH CARE EDUCATION/TRAINING PROGRAM

## 2024-03-15 PROCEDURE — 25000003 PHARM REV CODE 250: Performed by: THORACIC SURGERY (CARDIOTHORACIC VASCULAR SURGERY)

## 2024-03-15 PROCEDURE — 37799 UNLISTED PX VASCULAR SURGERY: CPT

## 2024-03-15 PROCEDURE — 37000008 HC ANESTHESIA 1ST 15 MINUTES: Performed by: THORACIC SURGERY (CARDIOTHORACIC VASCULAR SURGERY)

## 2024-03-15 PROCEDURE — 25000003 PHARM REV CODE 250

## 2024-03-15 PROCEDURE — 27000175 HC ADULT BYPASS PUMP

## 2024-03-15 PROCEDURE — 93325 DOPPLER ECHO COLOR FLOW MAPG: CPT | Mod: 26,,, | Performed by: ANESTHESIOLOGY

## 2024-03-15 PROCEDURE — 27800595 HC HEART VALVES

## 2024-03-15 PROCEDURE — 84100 ASSAY OF PHOSPHORUS: CPT | Performed by: PHYSICIAN ASSISTANT

## 2024-03-15 PROCEDURE — 99291 CRITICAL CARE FIRST HOUR: CPT | Mod: GC,,, | Performed by: ANESTHESIOLOGY

## 2024-03-15 PROCEDURE — 93005 ELECTROCARDIOGRAM TRACING: CPT

## 2024-03-15 PROCEDURE — 82803 BLOOD GASES ANY COMBINATION: CPT

## 2024-03-15 PROCEDURE — 36592 COLLECT BLOOD FROM PICC: CPT

## 2024-03-15 PROCEDURE — 80053 COMPREHEN METABOLIC PANEL: CPT | Performed by: STUDENT IN AN ORGANIZED HEALTH CARE EDUCATION/TRAINING PROGRAM

## 2024-03-15 PROCEDURE — C9113 INJ PANTOPRAZOLE SODIUM, VIA: HCPCS | Performed by: STUDENT IN AN ORGANIZED HEALTH CARE EDUCATION/TRAINING PROGRAM

## 2024-03-15 PROCEDURE — 93312 ECHO TRANSESOPHAGEAL: CPT | Mod: 26,59,, | Performed by: ANESTHESIOLOGY

## 2024-03-15 PROCEDURE — 93010 ELECTROCARDIOGRAM REPORT: CPT | Mod: ,,, | Performed by: INTERNAL MEDICINE

## 2024-03-15 PROCEDURE — 02UG0JZ SUPPLEMENT MITRAL VALVE WITH SYNTHETIC SUBSTITUTE, OPEN APPROACH: ICD-10-PCS | Performed by: THORACIC SURGERY (CARDIOTHORACIC VASCULAR SURGERY)

## 2024-03-15 PROCEDURE — C1729 CATH, DRAINAGE: HCPCS | Performed by: THORACIC SURGERY (CARDIOTHORACIC VASCULAR SURGERY)

## 2024-03-15 PROCEDURE — 99900035 HC TECH TIME PER 15 MIN (STAT)

## 2024-03-15 PROCEDURE — 64450 NJX AA&/STRD OTHER PN/BRANCH: CPT | Mod: 59,50,, | Performed by: ANESTHESIOLOGY

## 2024-03-15 PROCEDURE — 99223 1ST HOSP IP/OBS HIGH 75: CPT | Mod: ,,,

## 2024-03-15 PROCEDURE — 88304 TISSUE EXAM BY PATHOLOGIST: CPT | Performed by: PATHOLOGY

## 2024-03-15 PROCEDURE — 85730 THROMBOPLASTIN TIME PARTIAL: CPT | Performed by: PHYSICIAN ASSISTANT

## 2024-03-15 PROCEDURE — 63600175 PHARM REV CODE 636 W HCPCS: Performed by: STUDENT IN AN ORGANIZED HEALTH CARE EDUCATION/TRAINING PROGRAM

## 2024-03-15 PROCEDURE — 27100088 HC CELL SAVER

## 2024-03-15 PROCEDURE — 36556 INSERT NON-TUNNEL CV CATH: CPT | Mod: 59,,, | Performed by: ANESTHESIOLOGY

## 2024-03-15 PROCEDURE — 36000712 HC OR TIME LEV V 1ST 15 MIN: Performed by: THORACIC SURGERY (CARDIOTHORACIC VASCULAR SURGERY)

## 2024-03-15 PROCEDURE — 37000009 HC ANESTHESIA EA ADD 15 MINS: Performed by: THORACIC SURGERY (CARDIOTHORACIC VASCULAR SURGERY)

## 2024-03-15 PROCEDURE — 27100171 HC OXYGEN HIGH FLOW UP TO 24 HOURS

## 2024-03-15 PROCEDURE — 83735 ASSAY OF MAGNESIUM: CPT | Performed by: PHYSICIAN ASSISTANT

## 2024-03-15 PROCEDURE — 27201423 OPTIME MED/SURG SUP & DEVICES STERILE SUPPLY: Performed by: THORACIC SURGERY (CARDIOTHORACIC VASCULAR SURGERY)

## 2024-03-15 PROCEDURE — 25000003 PHARM REV CODE 250: Performed by: STUDENT IN AN ORGANIZED HEALTH CARE EDUCATION/TRAINING PROGRAM

## 2024-03-15 PROCEDURE — 85014 HEMATOCRIT: CPT

## 2024-03-15 PROCEDURE — C9248 INJ, CLEVIDIPINE BUTYRATE: HCPCS

## 2024-03-15 PROCEDURE — 63600175 PHARM REV CODE 636 W HCPCS

## 2024-03-15 PROCEDURE — 02VG0ZZ RESTRICTION OF MITRAL VALVE, OPEN APPROACH: ICD-10-PCS | Performed by: THORACIC SURGERY (CARDIOTHORACIC VASCULAR SURGERY)

## 2024-03-15 PROCEDURE — D9220A PRA ANESTHESIA: Mod: ,,, | Performed by: ANESTHESIOLOGY

## 2024-03-15 PROCEDURE — 63600175 PHARM REV CODE 636 W HCPCS: Mod: JZ,JG | Performed by: ANESTHESIOLOGY

## 2024-03-15 PROCEDURE — 85025 COMPLETE CBC W/AUTO DIFF WBC: CPT | Performed by: PHYSICIAN ASSISTANT

## 2024-03-15 PROCEDURE — 20600001 HC STEP DOWN PRIVATE ROOM

## 2024-03-15 PROCEDURE — 82800 BLOOD PH: CPT

## 2024-03-15 PROCEDURE — 02B70ZK EXCISION OF LEFT ATRIAL APPENDAGE, OPEN APPROACH: ICD-10-PCS | Performed by: THORACIC SURGERY (CARDIOTHORACIC VASCULAR SURGERY)

## 2024-03-15 PROCEDURE — C9248 INJ, CLEVIDIPINE BUTYRATE: HCPCS | Performed by: THORACIC SURGERY (CARDIOTHORACIC VASCULAR SURGERY)

## 2024-03-15 PROCEDURE — 27000445 HC TEMPORARY PACEMAKER LEADS

## 2024-03-15 PROCEDURE — 27200953 HC CARDIOPLEGIA SYSTEM

## 2024-03-15 PROCEDURE — 88304 TISSUE EXAM BY PATHOLOGIST: CPT | Mod: 26,,, | Performed by: PATHOLOGY

## 2024-03-15 PROCEDURE — 85520 HEPARIN ASSAY: CPT

## 2024-03-15 PROCEDURE — 85610 PROTHROMBIN TIME: CPT | Performed by: PHYSICIAN ASSISTANT

## 2024-03-15 PROCEDURE — 25000003 PHARM REV CODE 250: Performed by: PHYSICIAN ASSISTANT

## 2024-03-15 PROCEDURE — 36620 INSERTION CATHETER ARTERY: CPT | Mod: 59,,, | Performed by: ANESTHESIOLOGY

## 2024-03-15 PROCEDURE — 84132 ASSAY OF SERUM POTASSIUM: CPT

## 2024-03-15 PROCEDURE — 82330 ASSAY OF CALCIUM: CPT | Performed by: STUDENT IN AN ORGANIZED HEALTH CARE EDUCATION/TRAINING PROGRAM

## 2024-03-15 PROCEDURE — 63600175 PHARM REV CODE 636 W HCPCS: Performed by: PHYSICIAN ASSISTANT

## 2024-03-15 PROCEDURE — 94640 AIRWAY INHALATION TREATMENT: CPT

## 2024-03-15 PROCEDURE — 76942 ECHO GUIDE FOR BIOPSY: CPT

## 2024-03-15 PROCEDURE — 94002 VENT MGMT INPAT INIT DAY: CPT

## 2024-03-15 PROCEDURE — 94761 N-INVAS EAR/PLS OXIMETRY MLT: CPT | Mod: XB

## 2024-03-15 PROCEDURE — 86920 COMPATIBILITY TEST SPIN: CPT

## 2024-03-15 PROCEDURE — 27000191 HC C-V MONITORING

## 2024-03-15 PROCEDURE — 93320 DOPPLER ECHO COMPLETE: CPT | Mod: 26,,, | Performed by: ANESTHESIOLOGY

## 2024-03-15 PROCEDURE — 84295 ASSAY OF SERUM SODIUM: CPT

## 2024-03-15 PROCEDURE — 25000242 PHARM REV CODE 250 ALT 637 W/ HCPCS: Performed by: PHYSICIAN ASSISTANT

## 2024-03-15 PROCEDURE — 63600175 PHARM REV CODE 636 W HCPCS: Performed by: THORACIC SURGERY (CARDIOTHORACIC VASCULAR SURGERY)

## 2024-03-15 PROCEDURE — 27202608 HC CANNULA, MISC

## 2024-03-15 PROCEDURE — 33426 REPAIR OF MITRAL VALVE: CPT | Mod: GC,,, | Performed by: THORACIC SURGERY (CARDIOTHORACIC VASCULAR SURGERY)

## 2024-03-15 PROCEDURE — 82330 ASSAY OF CALCIUM: CPT

## 2024-03-15 PROCEDURE — 5A1221Z PERFORMANCE OF CARDIAC OUTPUT, CONTINUOUS: ICD-10-PCS | Performed by: THORACIC SURGERY (CARDIOTHORACIC VASCULAR SURGERY)

## 2024-03-15 DEVICE — RING 638RL32 CG FUTURE ANNULO 29L
Type: IMPLANTABLE DEVICE | Site: HEART | Status: FUNCTIONAL
Brand: CG FUTURE™

## 2024-03-15 RX ORDER — FENTANYL CITRATE 50 UG/ML
INJECTION, SOLUTION INTRAMUSCULAR; INTRAVENOUS
Status: DISCONTINUED | OUTPATIENT
Start: 2024-03-15 | End: 2024-03-15

## 2024-03-15 RX ORDER — BUPIVACAINE HYDROCHLORIDE 5 MG/ML
INJECTION, SOLUTION EPIDURAL; INTRACAUDAL
Status: COMPLETED | OUTPATIENT
Start: 2024-03-15 | End: 2024-03-15

## 2024-03-15 RX ORDER — POTASSIUM CHLORIDE 14.9 MG/ML
60 INJECTION INTRAVENOUS
Status: DISCONTINUED | OUTPATIENT
Start: 2024-03-15 | End: 2024-03-17

## 2024-03-15 RX ORDER — SODIUM CHLORIDE 0.9 % (FLUSH) 0.9 %
10 SYRINGE (ML) INJECTION
Status: DISCONTINUED | OUTPATIENT
Start: 2024-03-15 | End: 2024-03-25 | Stop reason: HOSPADM

## 2024-03-15 RX ORDER — LEVOTHYROXINE SODIUM 150 UG/1
150 TABLET ORAL
Status: DISCONTINUED | OUTPATIENT
Start: 2024-03-16 | End: 2024-03-25 | Stop reason: HOSPADM

## 2024-03-15 RX ORDER — ASPIRIN 300 MG/1
300 SUPPOSITORY RECTAL ONCE AS NEEDED
Status: DISCONTINUED | OUTPATIENT
Start: 2024-03-15 | End: 2024-03-15

## 2024-03-15 RX ORDER — POTASSIUM CHLORIDE 20 MEQ/1
20 TABLET, EXTENDED RELEASE ORAL EVERY 12 HOURS
Status: DISCONTINUED | OUTPATIENT
Start: 2024-03-15 | End: 2024-03-16

## 2024-03-15 RX ORDER — KETAMINE HCL IN 0.9 % NACL 50 MG/5 ML
SYRINGE (ML) INTRAVENOUS
Status: DISCONTINUED | OUTPATIENT
Start: 2024-03-15 | End: 2024-03-15

## 2024-03-15 RX ORDER — METOPROLOL TARTRATE 25 MG/1
25 TABLET, FILM COATED ORAL
Status: DISCONTINUED | OUTPATIENT
Start: 2024-03-15 | End: 2024-03-15

## 2024-03-15 RX ORDER — TRANEXAMIC ACID 100 MG/ML
INJECTION, SOLUTION INTRAVENOUS CONTINUOUS PRN
Status: DISCONTINUED | OUTPATIENT
Start: 2024-03-15 | End: 2024-03-15

## 2024-03-15 RX ORDER — PANTOPRAZOLE SODIUM 40 MG/10ML
40 INJECTION, POWDER, LYOPHILIZED, FOR SOLUTION INTRAVENOUS DAILY
Status: DISCONTINUED | OUTPATIENT
Start: 2024-03-15 | End: 2024-03-17

## 2024-03-15 RX ORDER — POTASSIUM CHLORIDE 29.8 MG/ML
40 INJECTION INTRAVENOUS
Status: DISCONTINUED | OUTPATIENT
Start: 2024-03-15 | End: 2024-03-17

## 2024-03-15 RX ORDER — NITROGLYCERIN 5 MG/ML
INJECTION, SOLUTION INTRAVENOUS
Status: DISCONTINUED | OUTPATIENT
Start: 2024-03-15 | End: 2024-03-15

## 2024-03-15 RX ORDER — MAGNESIUM SULFATE HEPTAHYDRATE 40 MG/ML
2 INJECTION, SOLUTION INTRAVENOUS
Status: DISCONTINUED | OUTPATIENT
Start: 2024-03-15 | End: 2024-03-19

## 2024-03-15 RX ORDER — BISACODYL 10 MG/1
10 SUPPOSITORY RECTAL DAILY PRN
Status: DISCONTINUED | OUTPATIENT
Start: 2024-03-15 | End: 2024-03-25 | Stop reason: HOSPADM

## 2024-03-15 RX ORDER — FAMOTIDINE 20 MG/1
20 TABLET, FILM COATED ORAL 2 TIMES DAILY
Status: DISCONTINUED | OUTPATIENT
Start: 2024-03-15 | End: 2024-03-16

## 2024-03-15 RX ORDER — OXYCODONE HYDROCHLORIDE 5 MG/1
5 TABLET ORAL EVERY 4 HOURS PRN
Status: DISCONTINUED | OUTPATIENT
Start: 2024-03-15 | End: 2024-03-25 | Stop reason: HOSPADM

## 2024-03-15 RX ORDER — INSULIN ASPART 100 [IU]/ML
0-5 INJECTION, SOLUTION INTRAVENOUS; SUBCUTANEOUS EVERY 6 HOURS PRN
Status: DISCONTINUED | OUTPATIENT
Start: 2024-03-15 | End: 2024-03-16

## 2024-03-15 RX ORDER — DEXMEDETOMIDINE HYDROCHLORIDE 4 UG/ML
0-1.4 INJECTION, SOLUTION INTRAVENOUS CONTINUOUS
Status: DISCONTINUED | OUTPATIENT
Start: 2024-03-15 | End: 2024-03-16

## 2024-03-15 RX ORDER — LIDOCAINE HYDROCHLORIDE 20 MG/ML
INJECTION, SOLUTION EPIDURAL; INFILTRATION; INTRACAUDAL; PERINEURAL
Status: DISCONTINUED | OUTPATIENT
Start: 2024-03-15 | End: 2024-03-15

## 2024-03-15 RX ORDER — DOCUSATE SODIUM 100 MG/1
100 CAPSULE, LIQUID FILLED ORAL 2 TIMES DAILY
Status: DISCONTINUED | OUTPATIENT
Start: 2024-03-15 | End: 2024-03-25 | Stop reason: HOSPADM

## 2024-03-15 RX ORDER — ACETAMINOPHEN 500 MG
1000 TABLET ORAL EVERY 8 HOURS
Status: DISCONTINUED | OUTPATIENT
Start: 2024-03-15 | End: 2024-03-21

## 2024-03-15 RX ORDER — ONDANSETRON HYDROCHLORIDE 2 MG/ML
4 INJECTION, SOLUTION INTRAVENOUS EVERY 12 HOURS PRN
Status: DISCONTINUED | OUTPATIENT
Start: 2024-03-15 | End: 2024-03-25 | Stop reason: HOSPADM

## 2024-03-15 RX ORDER — LIDOCAINE HYDROCHLORIDE 10 MG/ML
1 INJECTION, SOLUTION EPIDURAL; INFILTRATION; INTRACAUDAL; PERINEURAL
Status: DISCONTINUED | OUTPATIENT
Start: 2024-03-15 | End: 2024-03-15 | Stop reason: HOSPADM

## 2024-03-15 RX ORDER — NOREPINEPHRINE BITARTRATE 1 MG/ML
INJECTION, SOLUTION INTRAVENOUS
Status: DISCONTINUED | OUTPATIENT
Start: 2024-03-15 | End: 2024-03-15

## 2024-03-15 RX ORDER — HEPARIN SODIUM 1000 [USP'U]/ML
INJECTION, SOLUTION INTRAVENOUS; SUBCUTANEOUS
Status: DISCONTINUED | OUTPATIENT
Start: 2024-03-15 | End: 2024-03-15

## 2024-03-15 RX ORDER — METHOCARBAMOL 500 MG/1
500 TABLET, FILM COATED ORAL 4 TIMES DAILY
Status: DISCONTINUED | OUTPATIENT
Start: 2024-03-16 | End: 2024-03-16

## 2024-03-15 RX ORDER — NAPROXEN SODIUM 220 MG/1
81 TABLET, FILM COATED ORAL ONCE
Status: DISCONTINUED | OUTPATIENT
Start: 2024-03-15 | End: 2024-03-15

## 2024-03-15 RX ORDER — FENTANYL CITRATE 50 UG/ML
25 INJECTION, SOLUTION INTRAMUSCULAR; INTRAVENOUS
Status: DISCONTINUED | OUTPATIENT
Start: 2024-03-15 | End: 2024-03-15

## 2024-03-15 RX ORDER — PROPOFOL 10 MG/ML
INJECTION, EMULSION INTRAVENOUS CONTINUOUS PRN
Status: DISCONTINUED | OUTPATIENT
Start: 2024-03-15 | End: 2024-03-15

## 2024-03-15 RX ORDER — POLYETHYLENE GLYCOL 3350 17 G/17G
17 POWDER, FOR SOLUTION ORAL DAILY
Status: DISCONTINUED | OUTPATIENT
Start: 2024-03-15 | End: 2024-03-25 | Stop reason: HOSPADM

## 2024-03-15 RX ORDER — OXYCODONE HYDROCHLORIDE 10 MG/1
10 TABLET ORAL EVERY 4 HOURS PRN
Status: DISCONTINUED | OUTPATIENT
Start: 2024-03-15 | End: 2024-03-25 | Stop reason: HOSPADM

## 2024-03-15 RX ORDER — PHENYLEPHRINE HCL IN 0.9% NACL 1 MG/10 ML
SYRINGE (ML) INTRAVENOUS
Status: DISCONTINUED | OUTPATIENT
Start: 2024-03-15 | End: 2024-03-15

## 2024-03-15 RX ORDER — MUPIROCIN 20 MG/G
1 OINTMENT TOPICAL
Status: COMPLETED | OUTPATIENT
Start: 2024-03-15 | End: 2024-03-15

## 2024-03-15 RX ORDER — HYDROMORPHONE HYDROCHLORIDE 1 MG/ML
0.5 INJECTION, SOLUTION INTRAMUSCULAR; INTRAVENOUS; SUBCUTANEOUS EVERY 4 HOURS PRN
Status: DISCONTINUED | OUTPATIENT
Start: 2024-03-15 | End: 2024-03-16

## 2024-03-15 RX ORDER — TRAZODONE HYDROCHLORIDE 100 MG/1
100 TABLET ORAL NIGHTLY
Status: DISCONTINUED | OUTPATIENT
Start: 2024-03-16 | End: 2024-03-15

## 2024-03-15 RX ORDER — SODIUM CHLORIDE 9 MG/ML
INJECTION, SOLUTION INTRAVENOUS CONTINUOUS PRN
Status: DISCONTINUED | OUTPATIENT
Start: 2024-03-15 | End: 2024-03-15

## 2024-03-15 RX ORDER — FAMOTIDINE 10 MG/ML
20 INJECTION INTRAVENOUS 2 TIMES DAILY
Status: DISCONTINUED | OUTPATIENT
Start: 2024-03-15 | End: 2024-03-15

## 2024-03-15 RX ORDER — NAPROXEN SODIUM 220 MG/1
81 TABLET, FILM COATED ORAL DAILY
Status: DISCONTINUED | OUTPATIENT
Start: 2024-03-15 | End: 2024-03-15

## 2024-03-15 RX ORDER — DEXAMETHASONE SODIUM PHOSPHATE 4 MG/ML
INJECTION, SOLUTION INTRA-ARTICULAR; INTRALESIONAL; INTRAMUSCULAR; INTRAVENOUS; SOFT TISSUE
Status: DISCONTINUED | OUTPATIENT
Start: 2024-03-15 | End: 2024-03-15

## 2024-03-15 RX ORDER — PROTAMINE SULFATE 10 MG/ML
INJECTION, SOLUTION INTRAVENOUS
Status: DISCONTINUED | OUTPATIENT
Start: 2024-03-15 | End: 2024-03-15

## 2024-03-15 RX ORDER — IPRATROPIUM BROMIDE AND ALBUTEROL SULFATE 2.5; .5 MG/3ML; MG/3ML
3 SOLUTION RESPIRATORY (INHALATION) EVERY 4 HOURS
Status: COMPLETED | OUTPATIENT
Start: 2024-03-15 | End: 2024-03-16

## 2024-03-15 RX ORDER — MIDAZOLAM HYDROCHLORIDE 1 MG/ML
INJECTION, SOLUTION INTRAMUSCULAR; INTRAVENOUS
Status: DISCONTINUED | OUTPATIENT
Start: 2024-03-15 | End: 2024-03-15

## 2024-03-15 RX ORDER — GLUCAGON 1 MG
1 KIT INJECTION
Status: DISCONTINUED | OUTPATIENT
Start: 2024-03-15 | End: 2024-03-16

## 2024-03-15 RX ORDER — FENTANYL CITRATE 50 UG/ML
50 INJECTION, SOLUTION INTRAMUSCULAR; INTRAVENOUS
Status: DISCONTINUED | OUTPATIENT
Start: 2024-03-18 | End: 2024-03-15

## 2024-03-15 RX ORDER — ONDANSETRON 2 MG/ML
INJECTION INTRAMUSCULAR; INTRAVENOUS
Status: DISCONTINUED | OUTPATIENT
Start: 2024-03-15 | End: 2024-03-15

## 2024-03-15 RX ORDER — SODIUM CHLORIDE 9 MG/ML
INJECTION, SOLUTION INTRAVENOUS CONTINUOUS
Status: DISCONTINUED | OUTPATIENT
Start: 2024-03-15 | End: 2024-03-16

## 2024-03-15 RX ORDER — TRANEXAMIC ACID 100 MG/ML
INJECTION, SOLUTION INTRAVENOUS
Status: DISCONTINUED | OUTPATIENT
Start: 2024-03-15 | End: 2024-03-15

## 2024-03-15 RX ORDER — ROCURONIUM BROMIDE 10 MG/ML
INJECTION, SOLUTION INTRAVENOUS
Status: DISCONTINUED | OUTPATIENT
Start: 2024-03-15 | End: 2024-03-15

## 2024-03-15 RX ORDER — ASPIRIN 325 MG
325 TABLET, DELAYED RELEASE (ENTERIC COATED) ORAL DAILY
Status: DISCONTINUED | OUTPATIENT
Start: 2024-03-15 | End: 2024-03-15

## 2024-03-15 RX ORDER — PROPOFOL 10 MG/ML
0-50 INJECTION, EMULSION INTRAVENOUS CONTINUOUS
Status: DISCONTINUED | OUTPATIENT
Start: 2024-03-15 | End: 2024-03-16

## 2024-03-15 RX ORDER — IBUPROFEN 800 MG/1
800 TABLET ORAL EVERY 6 HOURS PRN
Status: ON HOLD | COMMUNITY
End: 2024-03-25 | Stop reason: HOSPADM

## 2024-03-15 RX ORDER — ACETAMINOPHEN 325 MG/1
650 TABLET ORAL EVERY 4 HOURS PRN
Status: DISCONTINUED | OUTPATIENT
Start: 2024-03-15 | End: 2024-03-15

## 2024-03-15 RX ORDER — IPRATROPIUM BROMIDE AND ALBUTEROL SULFATE 2.5; .5 MG/3ML; MG/3ML
3 SOLUTION RESPIRATORY (INHALATION) EVERY 4 HOURS PRN
Status: ACTIVE | OUTPATIENT
Start: 2024-03-15 | End: 2024-03-16

## 2024-03-15 RX ORDER — DEXTROMETHORPHAN HYDROBROMIDE, GUAIFENESIN 5; 100 MG/5ML; MG/5ML
650 LIQUID ORAL EVERY 8 HOURS
Status: ON HOLD | COMMUNITY
End: 2024-04-11 | Stop reason: HOSPADM

## 2024-03-15 RX ORDER — PROPOFOL 10 MG/ML
VIAL (ML) INTRAVENOUS
Status: DISCONTINUED | OUTPATIENT
Start: 2024-03-15 | End: 2024-03-15

## 2024-03-15 RX ORDER — MUPIROCIN 20 MG/G
1 OINTMENT TOPICAL 2 TIMES DAILY
Status: COMPLETED | OUTPATIENT
Start: 2024-03-15 | End: 2024-03-20

## 2024-03-15 RX ORDER — CEFAZOLIN SODIUM 1 G/3ML
INJECTION, POWDER, FOR SOLUTION INTRAMUSCULAR; INTRAVENOUS
Status: DISCONTINUED | OUTPATIENT
Start: 2024-03-15 | End: 2024-03-15

## 2024-03-15 RX ORDER — ATORVASTATIN CALCIUM 40 MG/1
40 TABLET, FILM COATED ORAL NIGHTLY
Status: DISCONTINUED | OUTPATIENT
Start: 2024-03-15 | End: 2024-03-16

## 2024-03-15 RX ORDER — TRAZODONE HYDROCHLORIDE 100 MG/1
100 TABLET ORAL NIGHTLY
Status: DISCONTINUED | OUTPATIENT
Start: 2024-03-15 | End: 2024-03-25 | Stop reason: HOSPADM

## 2024-03-15 RX ORDER — DEXTROSE MONOHYDRATE, SODIUM CHLORIDE, AND POTASSIUM CHLORIDE 50; 1.49; 4.5 G/1000ML; G/1000ML; G/1000ML
INJECTION, SOLUTION INTRAVENOUS CONTINUOUS
Status: DISCONTINUED | OUTPATIENT
Start: 2024-03-15 | End: 2024-03-16

## 2024-03-15 RX ORDER — ASPIRIN 325 MG
325 TABLET, DELAYED RELEASE (ENTERIC COATED) ORAL DAILY
Status: DISCONTINUED | OUTPATIENT
Start: 2024-03-15 | End: 2024-03-25 | Stop reason: HOSPADM

## 2024-03-15 RX ORDER — MAGNESIUM SULFATE HEPTAHYDRATE 40 MG/ML
4 INJECTION, SOLUTION INTRAVENOUS
Status: DISCONTINUED | OUTPATIENT
Start: 2024-03-15 | End: 2024-03-19

## 2024-03-15 RX ORDER — METOCLOPRAMIDE HYDROCHLORIDE 5 MG/ML
5 INJECTION INTRAMUSCULAR; INTRAVENOUS EVERY 6 HOURS PRN
Status: DISCONTINUED | OUTPATIENT
Start: 2024-03-15 | End: 2024-03-18

## 2024-03-15 RX ORDER — ALBUMIN HUMAN 50 G/1000ML
25 SOLUTION INTRAVENOUS ONCE AS NEEDED
Status: COMPLETED | OUTPATIENT
Start: 2024-03-15 | End: 2024-03-16

## 2024-03-15 RX ORDER — TRAZODONE HYDROCHLORIDE 100 MG/1
100 TABLET ORAL NIGHTLY
COMMUNITY

## 2024-03-15 RX ORDER — POTASSIUM CHLORIDE 14.9 MG/ML
20 INJECTION INTRAVENOUS
Status: DISCONTINUED | OUTPATIENT
Start: 2024-03-15 | End: 2024-03-17

## 2024-03-15 RX ADMIN — POTASSIUM CHLORIDE 20 MEQ: 1500 TABLET, EXTENDED RELEASE ORAL at 08:03

## 2024-03-15 RX ADMIN — FENTANYL CITRATE 50 MCG: 50 INJECTION, SOLUTION INTRAMUSCULAR; INTRAVENOUS at 08:03

## 2024-03-15 RX ADMIN — Medication 100 MCG: at 08:03

## 2024-03-15 RX ADMIN — ACETAMINOPHEN 1000 MG: 500 TABLET ORAL at 05:03

## 2024-03-15 RX ADMIN — NITROGLYCERIN 50 MCG: 5 INJECTION, SOLUTION INTRAVENOUS at 08:03

## 2024-03-15 RX ADMIN — OXYCODONE HYDROCHLORIDE 10 MG: 10 TABLET ORAL at 05:03

## 2024-03-15 RX ADMIN — HYDROMORPHONE HYDROCHLORIDE 0.5 MG: 0.5 INJECTION, SOLUTION INTRAMUSCULAR; INTRAVENOUS; SUBCUTANEOUS at 04:03

## 2024-03-15 RX ADMIN — CLEVIPIDINE 2 MG/HR: 0.5 EMULSION INTRAVENOUS at 12:03

## 2024-03-15 RX ADMIN — HEPARIN SODIUM 22000 UNITS: 1000 INJECTION, SOLUTION INTRAVENOUS; SUBCUTANEOUS at 09:03

## 2024-03-15 RX ADMIN — INSULIN HUMAN 1 UNITS/HR: 1 INJECTION, SOLUTION INTRAVENOUS at 02:03

## 2024-03-15 RX ADMIN — EPINEPHRINE 0.04 MCG/KG/MIN: 1 INJECTION INTRAMUSCULAR; INTRAVENOUS; SUBCUTANEOUS at 02:03

## 2024-03-15 RX ADMIN — NOREPINEPHRINE BITARTRATE 8 MCG: 1 INJECTION, SOLUTION, CONCENTRATE INTRAVENOUS at 08:03

## 2024-03-15 RX ADMIN — EPINEPHRINE 0.1 MCG/KG/MIN: 1 INJECTION INTRAMUSCULAR; INTRAVENOUS; SUBCUTANEOUS at 11:03

## 2024-03-15 RX ADMIN — ROCURONIUM BROMIDE 50 MG: 10 INJECTION, SOLUTION INTRAVENOUS at 08:03

## 2024-03-15 RX ADMIN — NITROGLYCERIN 50 MCG: 5 INJECTION, SOLUTION INTRAVENOUS at 12:03

## 2024-03-15 RX ADMIN — PROPOFOL 50 MG: 10 INJECTION, EMULSION INTRAVENOUS at 09:03

## 2024-03-15 RX ADMIN — LIDOCAINE HYDROCHLORIDE 100 MG: 20 INJECTION, SOLUTION EPIDURAL; INFILTRATION; INTRACAUDAL; PERINEURAL at 11:03

## 2024-03-15 RX ADMIN — MUPIROCIN 1 G: 20 OINTMENT TOPICAL at 08:03

## 2024-03-15 RX ADMIN — PROPOFOL 30 MG: 10 INJECTION, EMULSION INTRAVENOUS at 12:03

## 2024-03-15 RX ADMIN — MUPIROCIN 1 G: 20 OINTMENT TOPICAL at 06:03

## 2024-03-15 RX ADMIN — CEFAZOLIN 2 G: 330 INJECTION, POWDER, FOR SOLUTION INTRAMUSCULAR; INTRAVENOUS at 12:03

## 2024-03-15 RX ADMIN — FENTANYL CITRATE 50 MCG: 50 INJECTION, SOLUTION INTRAMUSCULAR; INTRAVENOUS at 11:03

## 2024-03-15 RX ADMIN — VASOPRESSIN 0.04 UNITS/MIN: 20 INJECTION INTRAVENOUS at 12:03

## 2024-03-15 RX ADMIN — IPRATROPIUM BROMIDE AND ALBUTEROL SULFATE 3 ML: .5; 3 SOLUTION RESPIRATORY (INHALATION) at 03:03

## 2024-03-15 RX ADMIN — FENTANYL CITRATE 200 MCG: 50 INJECTION, SOLUTION INTRAMUSCULAR; INTRAVENOUS at 07:03

## 2024-03-15 RX ADMIN — SODIUM CHLORIDE: 0.9 INJECTION, SOLUTION INTRAVENOUS at 07:03

## 2024-03-15 RX ADMIN — ACETAMINOPHEN 1000 MG: 500 TABLET ORAL at 10:03

## 2024-03-15 RX ADMIN — MIDAZOLAM 1 MG: 1 INJECTION INTRAMUSCULAR; INTRAVENOUS at 12:03

## 2024-03-15 RX ADMIN — CALCIUM CHLORIDE 300 MG: 100 INJECTION, SOLUTION INTRAVENOUS at 08:03

## 2024-03-15 RX ADMIN — CALCIUM CHLORIDE 1000 MG: 100 INJECTION, SOLUTION INTRAVENOUS at 11:03

## 2024-03-15 RX ADMIN — NITROGLYCERIN 25 MCG: 5 INJECTION, SOLUTION INTRAVENOUS at 08:03

## 2024-03-15 RX ADMIN — DEXMEDETOMIDINE HYDROCHLORIDE 0.8 MCG/KG/HR: 4 INJECTION, SOLUTION INTRAVENOUS at 02:03

## 2024-03-15 RX ADMIN — PROPOFOL 50 MG: 10 INJECTION, EMULSION INTRAVENOUS at 08:03

## 2024-03-15 RX ADMIN — TRAZODONE HYDROCHLORIDE 100 MG: 100 TABLET ORAL at 11:03

## 2024-03-15 RX ADMIN — PROTAMINE SULFATE 215 MG: 10 INJECTION, SOLUTION INTRAVENOUS at 12:03

## 2024-03-15 RX ADMIN — POLYETHYLENE GLYCOL 3350 17 G: 17 POWDER, FOR SOLUTION ORAL at 05:03

## 2024-03-15 RX ADMIN — ROCURONIUM BROMIDE 100 MG: 10 INJECTION, SOLUTION INTRAVENOUS at 07:03

## 2024-03-15 RX ADMIN — ONDANSETRON 4 MG: 2 INJECTION INTRAMUSCULAR; INTRAVENOUS at 08:03

## 2024-03-15 RX ADMIN — LIDOCAINE HYDROCHLORIDE 50 MG: 20 INJECTION, SOLUTION EPIDURAL; INFILTRATION; INTRACAUDAL; PERINEURAL at 08:03

## 2024-03-15 RX ADMIN — NOREPINEPHRINE BITARTRATE 0.04 MCG/KG/MIN: 1 INJECTION, SOLUTION, CONCENTRATE INTRAVENOUS at 11:03

## 2024-03-15 RX ADMIN — HEPARIN SODIUM 5000 UNITS: 1000 INJECTION, SOLUTION INTRAVENOUS; SUBCUTANEOUS at 10:03

## 2024-03-15 RX ADMIN — PROPOFOL 50 MCG/KG/MIN: 10 INJECTION, EMULSION INTRAVENOUS at 12:03

## 2024-03-15 RX ADMIN — PROTAMINE SULFATE 5 MG: 10 INJECTION, SOLUTION INTRAVENOUS at 12:03

## 2024-03-15 RX ADMIN — PANTOPRAZOLE SODIUM 40 MG: 40 INJECTION, POWDER, FOR SOLUTION INTRAVENOUS at 01:03

## 2024-03-15 RX ADMIN — HYDROMORPHONE HYDROCHLORIDE 0.5 MG: 0.5 INJECTION, SOLUTION INTRAMUSCULAR; INTRAVENOUS; SUBCUTANEOUS at 08:03

## 2024-03-15 RX ADMIN — LIDOCAINE HYDROCHLORIDE 100 MG: 20 INJECTION, SOLUTION EPIDURAL; INFILTRATION; INTRACAUDAL; PERINEURAL at 07:03

## 2024-03-15 RX ADMIN — DEXAMETHASONE SODIUM PHOSPHATE 8 MG: 4 INJECTION, SOLUTION INTRAMUSCULAR; INTRAVENOUS at 08:03

## 2024-03-15 RX ADMIN — BUPIVACAINE HYDROCHLORIDE 30 ML: 5 INJECTION, SOLUTION EPIDURAL; INTRACAUDAL; PERINEURAL at 07:03

## 2024-03-15 RX ADMIN — DOCUSATE SODIUM 100 MG: 100 CAPSULE, LIQUID FILLED ORAL at 08:03

## 2024-03-15 RX ADMIN — TRANEXAMIC ACID 1000 MG: 100 INJECTION INTRAVENOUS at 08:03

## 2024-03-15 RX ADMIN — IPRATROPIUM BROMIDE AND ALBUTEROL SULFATE 3 ML: .5; 3 SOLUTION RESPIRATORY (INHALATION) at 07:03

## 2024-03-15 RX ADMIN — FAMOTIDINE 20 MG: 20 TABLET, FILM COATED ORAL at 08:03

## 2024-03-15 RX ADMIN — MIDAZOLAM 1 MG: 1 INJECTION INTRAMUSCULAR; INTRAVENOUS at 07:03

## 2024-03-15 RX ADMIN — SODIUM CHLORIDE, SODIUM GLUCONATE, SODIUM ACETATE, POTASSIUM CHLORIDE, MAGNESIUM CHLORIDE, SODIUM PHOSPHATE, DIBASIC, AND POTASSIUM PHOSPHATE: .53; .5; .37; .037; .03; .012; .00082 INJECTION, SOLUTION INTRAVENOUS at 08:03

## 2024-03-15 RX ADMIN — SUGAMMADEX 200 MG: 100 INJECTION, SOLUTION INTRAVENOUS at 01:03

## 2024-03-15 RX ADMIN — PROPOFOL 100 MG: 10 INJECTION, EMULSION INTRAVENOUS at 07:03

## 2024-03-15 RX ADMIN — CEFAZOLIN 2 G: 2 INJECTION, POWDER, FOR SOLUTION INTRAMUSCULAR; INTRAVENOUS at 09:03

## 2024-03-15 RX ADMIN — TRANEXAMIC ACID 1 MG/KG/HR: 100 INJECTION INTRAVENOUS at 07:03

## 2024-03-15 RX ADMIN — CALCIUM CHLORIDE 700 MG: 100 INJECTION, SOLUTION INTRAVENOUS at 11:03

## 2024-03-15 RX ADMIN — NOREPINEPHRINE BITARTRATE 16 MCG: 1 INJECTION, SOLUTION, CONCENTRATE INTRAVENOUS at 01:03

## 2024-03-15 RX ADMIN — PROPOFOL 20 MCG/KG/MIN: 10 INJECTION, EMULSION INTRAVENOUS at 12:03

## 2024-03-15 RX ADMIN — ATORVASTATIN CALCIUM 40 MG: 40 TABLET, FILM COATED ORAL at 08:03

## 2024-03-15 RX ADMIN — FENTANYL CITRATE 50 MCG: 50 INJECTION, SOLUTION INTRAMUSCULAR; INTRAVENOUS at 12:03

## 2024-03-15 RX ADMIN — ASPIRIN 325 MG: 325 TABLET, COATED ORAL at 05:03

## 2024-03-15 RX ADMIN — CEFAZOLIN 2 G: 330 INJECTION, POWDER, FOR SOLUTION INTRAMUSCULAR; INTRAVENOUS at 08:03

## 2024-03-15 RX ADMIN — OXYCODONE HYDROCHLORIDE 10 MG: 10 TABLET ORAL at 09:03

## 2024-03-15 RX ADMIN — ROCURONIUM BROMIDE 50 MG: 10 INJECTION, SOLUTION INTRAVENOUS at 10:03

## 2024-03-15 RX ADMIN — Medication 50 MG: at 07:03

## 2024-03-15 RX ADMIN — PROPOFOL 20 MG: 10 INJECTION, EMULSION INTRAVENOUS at 12:03

## 2024-03-15 NOTE — TRANSFER OF CARE
"Anesthesia Transfer of Care Note    Patient: Mora Adrian    Procedure(s) Performed: Procedure(s) (LRB):  REPAIR, MITRAL VALVE, OPEN (N/A)  EXCLUSION, LEFT ATRIAL APPENDAGE, OPEN, AS PART OF OPEN CHEST SURGERY (N/A)    Patient location: ICU    Anesthesia Type: general    Transport from OR: Continuous ECG monitoring in transport. Continuous SpO2 monitoring in transport. Continuos invasive BP monitoring in transport. Continuous CVP monitoring in transport. Continuous PA pressure monitoring in transport. Transported from OR intubated on 100% O2  with adequate ventilation controlled by transport ventilator    Post pain: adequate analgesia    Post assessment: no apparent anesthetic complications and tolerated procedure well    Post vital signs: stable    Level of consciousness: sedated    Nausea/Vomiting: no nausea/vomiting    Complications: none    Transfer of care protocol was followed      Last vitals: Visit Vitals  BP (!) 146/67   Pulse 78   Temp 36.7 °C (98 °F) (Oral)   Resp 18   Ht 5' 6" (1.676 m)   Wt 100.5 kg (221 lb 9 oz)   SpO2 99%   Breastfeeding No   BMI 35.76 kg/m²     "

## 2024-03-15 NOTE — PROGRESS NOTES
Autotransfusion/Rapid Infusion Record:      03/15/2024  Autotransfusionist:  Kaiden Tomas    Surgeon(s) and Role:     * Pedrito Bernal MD - Primary     * Claudia Cedeño MD - Resident - Assisting     * Israel Esposito DO - Fellow  Anesthesiologist:  No responsible provider has been recorded for the case.    Past Medical History:   Diagnosis Date    Anxiety     Arthritis     GERD (gastroesophageal reflux disease)     Hyperlipidemia     Hypertension     Insomnia     Mitral valve insufficiency, unspecified etiology 01/2024    Thyroid disease     hypo       Procedure(s) (LRB):  REPAIR, MITRAL VALVE, OPEN (N/A)  EXCLUSION, LEFT ATRIAL APPENDAGE, OPEN, AS PART OF OPEN CHEST SURGERY (N/A)     1:43 PM    Equipment:    Cell Saver     R.I.S.  : [employer] Model: CATSmart or CATSplus : [place]   Model: [id number]     Serial number: [id number]   Serial number:    Disposable lot #: [id number]   Disposable lot #:      Were extra cardiotomies used for cell saver?  No     if yes, #:      Solutions:  Anticoagulant: ACD-A   Expiration date: 11/24 Volume used: 600   Wash solution: 0.9% NaCl   Expiration date: 01/26 Volume used: 4877     Cell saver checklist  Time completed:           [x]   Circuit assembled correctly     [x]   Cell saver powered and operational     [x]   Vacuum connected, functional, adjust to max -150mmHg     [x]   Anticoagulant drip rate adjusted     [x]   Transfer bag properly labeled with patient name, expiration time, volume,       anticoagulant, OR number, and initials     [x]   Cell saver disinfected after use (completed at end of case)       Cell Saver volumes:    Total volume processed:     4697 mL     Total volume pRBCs recovered     507 mL     Volume pRBCs infused     507 mL         RIS checklist   Time completed:  []   RIS circuit assembled correctly     []   RIS power and operational     []   RIS disinfected after use (completed at end of case)       RIS volumes:    Total volume  infused:    (see anesthesia record for blood       product information)    mL       Additional comments:

## 2024-03-15 NOTE — ASSESSMENT & PLAN NOTE
Home antihypertensives include lotensin. Will start when clinically appropriate.     - MAP 60-80 and SBP <140  - Cleviprex PRN

## 2024-03-15 NOTE — ANESTHESIA PROCEDURE NOTES
Central Line    Diagnosis: mitral regurgitation  Patient location during procedure: done in OR  Procedure start time: 3/15/2024 7:41 AM  Timeout: 3/15/2024 7:40 AM  Procedure end time: 3/15/2024 7:50 AM    Staffing  Authorizing Provider: Jeremy Manuel MD  Performing Provider: Bri Hernandez MD    Staffing  Performed by: Bri Hernandez MD  Authorized by: Jeremy Manuel MD    Anesthesiologist was present at the time of the procedure.  Preanesthetic Checklist  Completed: patient identified, IV checked, site marked, risks and benefits discussed, surgical consent, monitors and equipment checked, pre-op evaluation, timeout performed and anesthesia consent given  Indication   Indication: hemodynamic monitoring, vascular access, med administration     Anesthesia   general anesthesia    Central Line   Skin Prep: skin prepped with ChloraPrep, skin prep agent completely dried prior to procedure  Sterile Barriers Followed: Yes    All five maximal barriers used- gloves, gown, cap, mask, and large sterile sheet    hand hygiene performed prior to central venous catheter insertion  Location: right internal jugular.   Catheter type: double lumen (mac with slick)  Catheter Size: 9 Fr  Ultrasound: vascular probe with ultrasound   Vessel Caliber: patent  Needle advanced into vessel with real time Ultrasound guidance.  sterile gel and probe cover used in ultrasound-guided central venous catheter insertion   Manometry: Venous cannualation confirmed by visual estimation of blood vessel pressure using manometry.  Insertion Attempts: 1   Securement:line sutured, chlorhexidine patch, sterile dressing applied and blood return through all ports    Post-Procedure    Adverse Events:none      Guidewire Guidewire removed intact.

## 2024-03-15 NOTE — CONSULTS
[facility] - [facility] Intensive [facility]  Endocrinology  Diabetes Consult Note    Consult Requested by: Pedrito Bernal MD   Reason for admit: Nonrheumatic mitral valve regurgitation    HISTORY OF PRESENT ILLNESS:  Reason for Consult: Management of Hyperglycemia     Surgical Procedure and Date: Mitral valve annulopasty and left atrial appendage ligation with Dr. Bernal on 03/15/2024.    Diabetes diagnosis year: no hx of DM per chart review       HPI:   Patient is a 68 y.o. female with with a past medical history significant for anxiety, arthritis, GERD, hypertension, hyperlipidemia, hypothyroidism and HFrEF. She reports she was preparing for a knee operation and attempted to get cardiac clearance. She underwent an evaluation which demonstrated severe mitral regurgitation, severely dilated left and right atrium, and an ejection fraction of 35-40%. She denied any symptoms including dyspnea on exertion, chest pain, palpations, or lower extremity edema. The patient presents to the SICU s/p Mitral valve annulopasty and left atrial appendage ligation with Dr. Bernal on 03/15/2024. On admission, they are intubated, sedated with propfol and precedex, and in stable condition.Endocrine consulted for BG management.    Lab Results   Component Value Date    HGBA1C 5.0 03/14/2024         Interval HPI:   No acute events overnight. Patient in room 38767 CVICU/75055 CVICU A. Blood glucose stable. BG at and above goal on current insulin regimen (IIP). Steroid use- Dexamethasone  8 mg intra-op.   Day of Surgery  Renal function- Normal   Vasopressors-  Epinephrine     Diet NPO Except for: Sips with Medication     Eating:   NPO  Nausea: No  Hypoglycemia and intervention: No  Fever: No  TPN and/or TF: No    PMH, PSH, FH, SH updated and reviewed     ROS  Review of Systems   Reason unable to perform ROS: Intubated and sedated.       Current Medications and/or Treatments Impacting Glycemic Control  Immunotherapy:    Immunosuppressants        None          Steroids:   Hormones (From admission, onward)      None          Pressors:    Autonomic Drugs (From admission, onward)      Start     Stop Route Frequency Ordered    03/15/24 1330  EPINEPHrine 5 mg in dextrose 5% 250 mL infusion (premix)        Question Answer Comment   Begin at (in mcg/kg/min): 0.02    Titrate by: (in mcg/kg/min) 0.02    Titrate interval: (in minutes) 5    Titrate to maintain: (SBP or MAP or Cardiac Index) MAP    Greater than: (in mmHg) 65    Cardiac index greater than: (in L/min) 2.2    Maximum dose: (in mcg/kg/min) 2        -- IV Continuous 03/15/24 1319          Hyperglycemia/Diabetes Medications:   Antihyperglycemics (From admission, onward)      Start     Stop Route Frequency Ordered    03/15/24 1534  insulin aspart U-100 pen 0-5 Units         -- SubQ Every 6 hours PRN 03/15/24 1434    03/15/24 1330  insulin regular in 0.9 % NaCl 100 unit/100 mL (1 unit/mL) infusion        Question Answer Comment   Insulin rate changes (DO NOT MODIFY ANSWER) \\[email or web address] [id number]    Enter initial dose (Units/hr): 1        -- IV Continuous 03/15/24 1319             PHYSICAL EXAMINATION:  Vitals:    03/15/24 1517   BP:    Pulse: 88   Resp: (!) 24   Temp:      Body mass index is 35.76 kg/m².     Physical Exam  Constitutional:       General: She is not in acute distress.     Appearance: Normal appearance. She is not ill-appearing.   HENT:      Head: Normocephalic and atraumatic.      Right Ear: Tympanic membrane normal.      Left Ear: Tympanic membrane normal.      Nose: Nose normal.   Cardiovascular:      Rate and Rhythm: Normal rate and regular rhythm.   Pulmonary:      Effort: No respiratory distress.      Comments: Intubated    Abdominal:      General: There is no distension.   Neurological:      Comments: Sedated            Labs Reviewed and Include   Recent Labs   Lab 03/15/24  1354   *  208*   CALCIUM 9.5  9.5   ALBUMIN 3.0*   "3.0*   PROT 5.5*  5.5*     136   K 4.1  4.1   CO2 14*  14*     109   BUN 17  17   CREATININE 0.8  0.8   ALKPHOS 65  65   ALT 22  22   AST 54*  54*   BILITOT 0.5  0.5     Lab Results   Component Value Date    WBC 15.26 (H) 03/15/2024    WBC 15.26 (H) 03/15/2024    HGB 11.6 (L) 03/15/2024    HGB 11.6 (L) 03/15/2024    HCT 35 (L) 03/15/2024     (H) 03/15/2024     (H) 03/15/2024     03/15/2024     03/15/2024     No results for input(s): "TSH", "FREET4" in the last 168 hours.  Lab Results   Component Value Date    HGBA1C 5.0 03/14/2024       Nutritional status:   Body mass index is 35.76 kg/m².  Lab Results   Component Value Date    ALBUMIN 3.0 (L) 03/15/2024    ALBUMIN 3.0 (L) 03/15/2024    ALBUMIN 3.7 03/14/2024     No results found for: "PREALBUMIN"    Estimated Creatinine Clearance: 80.5 mL/min (based on SCr of 0.8 mg/dL).    Accu-Checks  Recent Labs     03/15/24  1334 03/15/24  1433   POCTGLUCOSE 193* 206*        ASSESSMENT and PLAN    Cardiac/Vascular  * Nonrheumatic mitral valve regurgitation    Managed per primary team      Endocrine  Transient hyperglycemia post procedure  BG goal: 110-140    - Continue insulin infusion protocol   - POCT Glucose every hour   - Hypoglycemia protocol in place      ** Please notify Endocrine for any change and/or advance in diet**  ** Please call Endocrine for any BG related issues **     Discharge Planning:   TBD. Please notify endocrine prior to discharge.         Orthopedic  Surgical wound present  Optimize BG control             Plan discussed with patient, family, and RN at bedside.     Nia Fernandes PA-C  Endocrinology  [facility] - [facility]  "

## 2024-03-15 NOTE — INTERVAL H&P NOTE
The patient has been examined and the H&P has been reviewed:    I concur with the findings and no changes have occurred since H&P was written.    Surgery risks, benefits and alternative options discussed and understood by patient/family.          Active Hospital Problems    Diagnosis  POA    *Nonrheumatic mitral valve regurgitation [I34.0]  Yes    Heart failure with reduced ejection fraction [I50.20]  Yes    GERD (gastroesophageal reflux disease) [K21.9]  Yes    HTN (hypertension) [I10]  Yes    HLD (hyperlipidemia) [E78.5]  Yes    Hypothyroidism [E03.9]  Yes      Resolved Hospital Problems   No resolved problems to display.

## 2024-03-15 NOTE — H&P
[facility] - Surgical Intensive Care  Critical Care - Surgery  History & Physical    Patient Name: Mora Adrian  MRN: [id number]  Admission Date: 3/15/2024  Code Status: Full Code  Attending Physician: Pedrito Bernal MD   Primary Care Provider: Marvin Bone MD   Principal Problem: Nonrheumatic mitral valve regurgitation    Subjective:     HPI:  Mora Adrian is a 68 y.o. female with a past medical history significant for anxiety, arthritis, GERD, hypertension, hyperlipidemia, hypothyroidism and HFrEF. She reports she was preparing for a knee operation and attempted to get cardiac clearance. She underwent an evaluation which demonstrated severe mitral regurgitation, severely dilated left and right atrium, and an ejection fraction of 35-40%. She denied any symptoms including dyspnea on exertion, chest pain, palpations, or lower extremity edema.      The patient presents to the SICU s/p Mitral valve annulopasty and left atrial appendage ligation with Dr. Bernal on 03/15/2024. On admission, they are intubated, sedated with propfol and precedex, and in stable condition. Inotropic and vasopressor requirements upon admission are 0.04 mcg/kg/min of epinephrine and cleviprex 6 to maintain a MAP 60-80 and SBP < 140. Central access includes a RIJ CVC, arterial access includes a left radial arterial line. They also have 1 R pleural and 2 mediastinal chest tubes with appropriate output.    Intraoperatively, they received 3L of crystalloid, 500 of cell saver, no blood products. Urine output intraoperatively was 1L. Post-operative echo was 45% LVEF, Normal RV. NO MR. Normal gradient.    Immediate post-operative plans include hemodynamic stabilization and weaning of cardiac and respiratory support. Plan to wean vasopressors and inotropes as tolerated, wean ventilator support with goal of early extubation, and closely monitor fluid status with strict Is/Os and continued fluid resuscitation as  needed.      Hospital/ICU Course:  No notes on file    Follow-up For: Procedure(s) (LRB):  REPAIR, MITRAL VALVE, OPEN (N/A)  EXCLUSION, LEFT ATRIAL APPENDAGE, OPEN, AS PART OF OPEN CHEST SURGERY (N/A)    Post-Operative Day: Day of Surgery     Past Medical History:   Diagnosis Date    Anxiety     Arthritis     GERD (gastroesophageal reflux disease)     Hyperlipidemia     Hypertension     Insomnia     Mitral valve insufficiency, unspecified etiology 2024    Thyroid disease     hypo       Past Surgical History:   Procedure Laterality Date    APPENDECTOMY      BREAST CYST ASPIRATION       SECTION, CLASSIC      EYE SURGERY Bilateral     cataracts w/iol    GALLBLADDER SURGERY      GASTRIC BYPASS      HYSTERECTOMY      TOTAL KNEE ARTHROPLASTY Right     TRANSESOPHAGEAL ECHOCARDIOGRAPHY N/A 2024    Procedure: ECHOCARDIOGRAM, TRANSESOPHAGEAL;  Surgeon: Bobby Card III, MD;  Location: [facility];  Service: Cardiology;  Laterality: N/A;       Review of patient's allergies indicates:  No Known Allergies    Family History       Problem Relation (Age of Onset)    Cirrhosis Father          Tobacco Use    Smoking status: Never    Smokeless tobacco: Never   Substance and Sexual Activity    Alcohol use: Yes     Alcohol/week: 10.0 standard drinks of alcohol     Types: 10 Glasses of wine per week    Drug use: Never    Sexual activity: Not on file      Review of Systems   Unable to perform ROS: Intubated     Objective:     Vital Signs (Most Recent):  Temp: 98.2 °F (36.8 °C) (03/15/24 1400)  Pulse: 87 (03/15/24 1415)  Resp: (!) 21 (03/15/24 1415)  BP: (!) 146/67 (03/15/24 0558)  SpO2: 95 % (03/15/24 1415) Vital Signs (24h Range):  Temp:  [98 °F (36.7 °C)-98.2 °F (36.8 °C)] 98.2 °F (36.8 °C)  Pulse:  [78-88] 87  Resp:  [13-39] 21  SpO2:  [95 %-99 %] 95 %  BP: (146)/(67) 146/67  Arterial Line BP: (100-118)/(49-60) 112/49     Weight: 100.5 kg (221 lb 9 oz)  Body mass index is 35.76 kg/m².      Intake/Output Summary (Last  24 hours) at 3/15/2024 1424  Last data filed at 3/15/2024 1410  Gross per 24 hour   Intake 3159.17 ml   Output 720 ml   Net 2439.17 ml          Physical Exam  Vitals and nursing note reviewed.   Constitutional:       Interventions: She is intubated.   HENT:      Mouth/Throat:      Comments: ETT  Cardiovascular:      Rate and Rhythm: Normal rate and regular rhythm.      Pulses: Normal pulses.      Heart sounds: Normal heart sounds.   Pulmonary:      Effort: Pulmonary effort is normal. She is intubated.      Breath sounds: Normal breath sounds.   Chest:      Comments: Chest incision CDI  2 Meds, 1 R pleural chest tubes to suction  Abdominal:      General: Abdomen is flat. Bowel sounds are normal.      Palpations: Abdomen is soft.   Genitourinary:     Comments: Lance  Skin:     General: Skin is warm.      Capillary Refill: Capillary refill takes less than 2 seconds.            Vents:  Vent Mode: A/C (03/15/24 1336)  Set Rate: 16 BPM (03/15/24 1336)  Vt Set: 360 mL (03/15/24 1336)  PEEP/CPAP: 5 cmH20 (03/15/24 1336)  Oxygen Concentration (%): 50 (03/15/24 1415)  Peak Airway Pressure: 20 cmH20 (03/15/24 1336)  Plateau Pressure: 0 cmH20 (03/15/24 1336)  Total Ve: 6.23 L/m (03/15/24 1336)  Negative Inspiratory Force (cm H2O): 0 (03/15/24 1336)    Lines/Drains/Airways       Central Venous Catheter Line  Duration              Introducer with Double Lumen 03/15/24 0800 Internal Jugular Right <1 day    Percutaneous Central Line - Triple Lumen  03/15/24 0800 Internal Jugular Right <1 day              Drain  Duration                  Chest Tube 03/15/24 1303 Tube - 3 Right Pleural 19 Fr. <1 day         NG/OG Tube 03/15/24 1401 orogastric <1 day         Urethral Catheter 03/15/24 0811 Temperature probe 16 Fr. <1 day         Y Chest Tube 1 and 2 03/15/24 1303 1 Anterior Mediastinal 24 Fr. 2 Mediastinal 19 Fr. <1 day              Airway  Duration                  Airway - Non-Surgical 03/15/24 0749 <1 day              Arterial Line   Duration             Arterial Line 03/15/24 0740 Left Radial <1 day              Line  Duration                  Pacer Wires 03/15/24 1151 <1 day              Peripheral Intravenous Line  Duration                  Peripheral IV - Single Lumen 03/15/24 0603 20 G Left;Posterior Hand <1 day         Peripheral IV - Single Lumen 03/15/24 0805 16 G Right;Posterior Forearm <1 day                    Significant Labs:    CBC/Anemia Profile:  Recent Labs   Lab 03/14/24  1354 03/15/24  1021 03/15/24  1150 03/15/24  1335 03/15/24  1354   WBC 5.37  --   --   --  15.26*  15.26*   HGB 11.7*  --   --   --  11.6*  11.6*   HCT 36.0*   < > 26* 33* 35.2*  35.2*     --   --   --  178  178   *  --   --   --  101*  101*   RDW 13.3  --   --   --  13.4  13.4    < > = values in this interval not displayed.        Chemistries:  Recent Labs   Lab 03/14/24  1354   *   K 4.1      CO2 23   BUN 24*   CREATININE 1.1   CALCIUM 9.4   ALBUMIN 3.7   PROT 6.7   BILITOT 0.4   ALKPHOS 95   ALT 25   AST 35       All pertinent labs within the past 24 hours have been reviewed.    Significant Imaging: I have reviewed all pertinent imaging results/findings within the past 24 hours.  Assessment/Plan:     Cardiac/Vascular  * Nonrheumatic mitral valve regurgitation  Mora Adrian is a 68 y.o. female with a past medical history significant for anxiety, arthritis, GERD, hypertension, hyperlipidemia, hypothyroidism and HFrEF. She reports she was preparing for a knee operation and attempted to get cardiac clearance. She underwent an evaluation which demonstrated severe mitral regurgitation, severely dilated left and right atrium, and an ejection fraction of 35-40%. She denied any symptoms including dyspnea on exertion, chest pain, palpations, or lower extremity edema.        Neuro/Psych:     - Sedation: propofol/precedex    - Pain:    - Scheduled Tylenol 1g q8h   - Fentanyl PRN while intubated, Oxy PRN             Cardiac:     -  S/P Mitral Valve repair and Left Atrial Appendage Ligation with Dr. Bernal on 3/15/24    - BP Goal: MAP 60-80     - Cleviprex PRN    - Pressors: Epi 0.04    - Anti-HTNs: Will start when appropriate    - Rhythm: NSR    - Beta blocker: Will start when appropriate    - Statin: Atorvastatin 40      Pulmonary:     - Goal SpO2 >92%    - Will wean ventilator support as tolerated to extubate    - Chest Tubes x 3 (2 Meds & 1 R Pleural)    - ABGs PRN      Renal:    - Trend BUN/Cr     - Maintain Lance, record strict Is/Os    Recent Labs   Lab 03/14/24  1354   BUN 24*   CREATININE 1.1         FEN / GI:     - Daily CMP, PRN K/Mag/Phos per protocol     - Replace electrolytes as needed    - Nutrition: NPO pending extubation    - Bowel Regimen: Miralax, docusate      ID:     - Afebrile    - WBC stable    - Abx: Complete perioperative cefazolin 2g Q8H x 5 doses    Recent Labs   Lab 03/14/24  1354   WBC 5.37         Heme/Onc:     - Hgb 11.7 pre-operatively    - CBC daily    - ASA 325mg daily    Recent Labs   Lab 03/14/24  1354   HGB 11.7*      APTT 27.2   INR 1.0         Endocrine:     - CTS Goal -140    - HgbA1c:     - Endocrinology consulted for insulin management      PPx:   Feeding: NPO, pending extubation and bedside swallow evaluation  Analgesia/Sedation: Multimodal  Thromboembolic Prevention: SCD's   HOB >30: Yes  Stress Ulcer: Home Protonix  Glucose Control: Yes, insulin management per Endocrinology     Lines/Drains/Airway:   ETT   Left radial arterial line   RIJ CVC   Lance   Chest Tubes: x2 (2 Mediastinal, 1 R Pleural)    Pacing Wires: Temporary ventricular pacing wires      Dispo/Code Status/Palliative:     - SICU    - Full Code      HLD (hyperlipidemia)  - Continue Home Zetia    HTN (hypertension)  Home antihypertensives include lotensin. Will start when clinically appropriate.     - MAP 60-80 and SBP <140  - Cleviprex PRN      Heart failure with reduced ejection fraction  - Restart BB when clinically  appropriate     Endocrine  Hypothyroidism  - Continue Home Synthroid    GI  GERD (gastroesophageal reflux disease)  - Continue home PPI         Sidney Villagomez MD  Critical Care - Surgery  [facility] - [facility]

## 2024-03-15 NOTE — CONSULTS
Patient will be contact after follow up appointment with Dr. Bernal.  Keysha Shelley, RN  Cardiac Rehab Nurse

## 2024-03-15 NOTE — HPI
Mora Adrian is a 68 y.o. female with a past medical history significant for anxiety, arthritis, GERD, hypertension, hyperlipidemia, hypothyroidism and HFrEF. She reports she was preparing for a knee operation and attempted to get cardiac clearance. She underwent an evaluation which demonstrated severe mitral regurgitation, severely dilated left and right atrium, and an ejection fraction of 35-40%. She denied any symptoms including dyspnea on exertion, chest pain, palpations, or lower extremity edema.      The patient presents to the SICU s/p Mitral valve annulopasty and left atrial appendage ligation with Dr. Bernal on 03/15/2024. On admission, they are intubated, sedated with propfol and precedex, and in stable condition. Inotropic and vasopressor requirements upon admission are 0.04 mcg/kg/min of epinephrine and cleviprex 6 to maintain a MAP 60-80 and SBP < 140. Central access includes a RIJ CVC, arterial access includes a left radial arterial line. They also have 1 R pleural and 2 mediastinal chest tubes with appropriate output.    Intraoperatively, they received 3L of crystalloid, 500 of cell saver, no blood products. Urine output intraoperatively was 1L. Post-operative echo was 45% LVEF, Normal RV. NO MR. Normal gradient.    Immediate post-operative plans include hemodynamic stabilization and weaning of cardiac and respiratory support. Plan to wean vasopressors and inotropes as tolerated, wean ventilator support with goal of early extubation, and closely monitor fluid status with strict Is/Os and continued fluid resuscitation as needed.

## 2024-03-15 NOTE — ANESTHESIA PROCEDURE NOTES
PACO    Diagnosis: mitral regurgitation  Patient location during procedure: OR  Exam type: Baseline    Staffing  Performed: anesthesiologist and fellow     Anesthesiologist: Jeremy Manuel MD        Anesthesiologist Present  Yes      Setup & Induction  Patient preparation: bite block inserted  Probe Insertion: easy  Exam: completeDoppler Echo: 2D, continuous wave Doppler, 3D, color flow mapping and pulse wave Doppler.  Exam     Left Heart  Left Atruim: severly enlarged (men >5.2; women >4.7) and 6  Left appendage velocity:30    Left Ventricle: cm, mildy abnormal (men 6.0-6.3; women 5.3-5.6)  LV Wall Thickness (posterior wall):cm, mildly abnormal (men 1.1-1.3 cm; women 1.0-1.2 cm)    LVAD:no  FINDINGS - ESTIMATED EJECTION FRACTION: 40-45.  Regional Wall Abnormalities: no RWMA            Right Heart  Right Ventricle: normal  Right Ventricle Function: mildly decreased  Right Atria:  normal    Intra Atrial Septum  PFO: no shunt by color flow doppler  no IAS aneurysm  no lipomatous hypertrophy  no Atrial Septal Defect (Asd)    Right Ventricle  Size: normal, Free Wall Thickness: normal    Aortic Valve:  Stenosis: none.  Morphology: trileaflet    Regurgitation: no aortic valve regurgitation      Mitral Valve:   Morphology:normal  Prolapse: none  Flail: no flail  Jet Description: moderate and centrally-directedStenosis: no significant stenosis.    Tricuspid Valve:  Morphology: normal  Regurgitation: mild    Pulmonic Valve:  Morphology:normal  Regurgitation(color flow): none    Great Vessels  Ascending Aorta Atherosclerosis: 2=mild dz (<3mm)  Aortic Arch Atherosclerosis: 2=mild dz (<3mm)  IABP: no  Descending Aorta Atherosclerosis: 2=mild dz (<3mm)  Aorta    Descending aorta IABP: no    Effusions none    SummaryFindings discussed with surgeon.    Other Findings   Intraoperative PACO for Dr. Bernal    Baseline exam:  - LV systolic function is abnormal with LVEF 40-45%  - RV systolic function is mildly depressed with  basilar free wal function preserved relative to LVOT  - No regional wall motion abnormalities  - MV: Moderate central MR; VC 0.5 cm; MV annulus 4 cm; RA 6 cm; no MS  - AV: Trileaflet; No AI or AS  - TV: Mild TR  - No PFO via color flow doppler  - No clot appreciated in the JEMAL  - No significant aortic pathology  - No effusions    Post-procedure exam:  - s/p MVr  - Gtt: Epi 0.05  - LV systolic function is abnormal with LVEF of 45%  - RV systolic function is normal on inotropic support  - No regional wall motion abnormalities  - MV: s/p ring and karli; No MR; MG 2 mmHg  - TV: Mild TR  - No PFO via color flow doppler  - No significant aortic pathology  - No effusions  - Interval removal of JEMAL     Stable s/p chest closure  Probe removed atraumatically    Note: TG limited 2/2 previous gastric bypass surgery.

## 2024-03-15 NOTE — ASSESSMENT & PLAN NOTE
Mora Adrian is a 68 y.o. female with a past medical history significant for anxiety, arthritis, GERD, hypertension, hyperlipidemia, hypothyroidism and HFrEF. She reports she was preparing for a knee operation and attempted to get cardiac clearance. She underwent an evaluation which demonstrated severe mitral regurgitation, severely dilated left and right atrium, and an ejection fraction of 35-40%. She denied any symptoms including dyspnea on exertion, chest pain, palpations, or lower extremity edema.        Neuro/Psych:     - Sedation: propofol/precedex    - Pain:    - Scheduled Tylenol 1g q8h   - Fentanyl PRN while intubated, Oxy PRN             Cardiac:     - S/P Mitral Valve repair and Left Atrial Appendage Ligation with Dr. Bernal on 3/15/24    - BP Goal: MAP 60-80     - Cleviprex PRN    - Pressors: Epi 0.04    - Anti-HTNs: Will start when appropriate    - Rhythm: NSR    - Beta blocker: Will start when appropriate    - Statin: Atorvastatin 40      Pulmonary:     - Goal SpO2 >92%    - Will wean ventilator support as tolerated to extubate    - Chest Tubes x 3 (2 Meds & 1 R Pleural)    - ABGs PRN      Renal:    - Trend BUN/Cr     - Maintain Lance, record strict Is/Os    Recent Labs   Lab 03/14/24  1354   BUN 24*   CREATININE 1.1         FEN / GI:     - Daily CMP, PRN K/Mag/Phos per protocol     - Replace electrolytes as needed    - Nutrition: NPO pending extubation    - Bowel Regimen: Miralax, docusate      ID:     - Afebrile    - WBC stable    - Abx: Complete perioperative cefazolin 2g Q8H x 5 doses    Recent Labs   Lab 03/14/24  1354   WBC 5.37         Heme/Onc:     - Hgb 11.7 pre-operatively    - CBC daily    - ASA 325mg daily    Recent Labs   Lab 03/14/24  1354   HGB 11.7*      APTT 27.2   INR 1.0         Endocrine:     - CTS Goal -140    - HgbA1c:     - Endocrinology consulted for insulin management      PPx:   Feeding: NPO, pending extubation and bedside swallow  evaluation  Analgesia/Sedation: Multimodal  Thromboembolic Prevention: SCD's   HOB >30: Yes  Stress Ulcer: Home Protonix  Glucose Control: Yes, insulin management per Endocrinology     Lines/Drains/Airway:   ETT   Left radial arterial line   RIJ CVC   Lance   Chest Tubes: x2 (2 Mediastinal, 1 R Pleural)    Pacing Wires: Temporary ventricular pacing wires      Dispo/Code Status/Palliative:     - SICU    - Full Code

## 2024-03-15 NOTE — OP NOTE
DATE OF PROCEDURE:  3/15/2024     ATTENDING SURGEON:  Pedrito Bernal M.D.    ASSISTANT: Israel Esposito DO, (Cardiothoracic Resident)     PREOPERATIVE DIAGNOSES:  1.  Severe mitral regurgitation.  2.  Hypertension  3.  Hyperlipidemia  4.  Hypothyroidism  5.  Chronic diastolic heart failure    POSTOPERATIVE DIAGNOSES:  1.  Severe mitral regurgitation.  2.  Hypertension  3.  Hyperlipidemia  4.  Hypothyroidism  5.  Chronic diastolic heart failure    OPERATION PERFORMED:      1)  Mitral valve repair with a central Mary Jo stitch and 32 mm Medtronic Future band annuloplasty.    2)  Resection of left atrial appendage     ANESTHESIA:  General endotracheal.     ESTIMATED BLOOD LOSS:  100 mL.      BRIEF HISTORY:  Ms. Adrian is a 68-year-old woman who was being evaluated for knee replacement.  As part of her cardiac clearance she had an echo which demonstrated a reduced ejection fraction, severe mitral regurgitation, and severe dilation of the left atrium.  She reported decreased stamina and increased fatigue.  Coronary angiography failed to demonstrate any hemodynamically significant lesions.  We plan mitral valve repair and resection of left atrial appendage given her risk for atrial fibrillation.     PROCEDURE IN DETAIL:  After obtaining informed and written consent, the patient   was brought to the Operating Room and placed on the operating table in supine   position.  After induction of adequate general endotracheal anesthesia, the   patient's chest, abdomen, pelvis and bilateral lower extremities were prepped   and draped in the usual sterile fashion.  An upper midline skin incision was   made, and a median sternotomy was performed.  A pericardial well was created.    The patient was systemically heparinized.  Cannulation sutures were placed in   the aorta and in the superior vena cava.  The aortic cannula was inserted,   followed by the venous.  An IVC cannula was also placed.  Antegrade and   retrograde  cardioplegia catheters were placed.  The patient was then put on   cardiopulmonary bypass.  Once on bypass, circumferential control was obtained   over the superior vena cava.  The aortic crossclamp was applied, and the heart   was arrested using cold blood enhanced antegrade cardioplegia.  A prompt   electromechanical arrest was achieved.    Once the cardioplegia was all in, we first addressed the left atrial appendage. The left atrial appendage was resected using the Fisk stapler. A left atriotomy was then made near the right-sided pulmonary veins. The Tabor retractor was then placed for exposure, and the mitral valve was evaluated.    The leaflets appeared structurally normal.  Upon testing, there was a central jet.  Multiple nonpledgeted 2-0 Ethibond sutures were placed circumferentially around the annulus. The annulus was sized, and a 32 mm complete ring was selected.  The ring was brought up, the annuloplasty sutures were passed through it, and it was slid into position.  It seated   nicely.  The sutures were tied and then cut.  The valve was again tested, and central mitral regurgitation persisted.  A central Mary Jo stitch was placed using a 5-0 Ethibond suture.  The valve was again tested, and no mitral regurgitation was seen.  The left atriotomy was then closed in a single layer using running 4-0 Prolene suture.  Prior to closing the left   atrium, de-airing maneuvers were performed.  Once the left atrium was closed,   the hot shot was given retrograde.  Additional de-airing maneuvers were   performed, and the aortic cross-clamp was removed.     The patient was subsequently weaned from cardiopulmonary bypass.  The patient did separate easily from   bypass.  Once off bypass, all surgical sites were inspected.  There was good   hemostasis.  The valve was evaluated using PACO.  There was no residual mitral   regurgitation seen.  There was no mitral stenosis.  The test   dose of protamine was administered,  and this was well tolerated.  The total dose   was then given.  longterm through the total dose, all cannulas were removed.    All surgical sites were again inspected, again there was good hemostasis.    Ventricular pacing wires were placed.  Drains were placed.  After again   confirming adequate hemostasis, the patient's chest was closed using eight #6   stainless steel wires to reapproximate the sternum.  The overlying soft tissues   were reapproximated using absorbable suture material.  The patient's chest was   washed and dried, and a dry dressing was applied.  The patient tolerated the   procedure well, there were no complications.  At the conclusion of the case,   sponge and instrument counts were correct.

## 2024-03-15 NOTE — ANESTHESIA PROCEDURE NOTES
Arterial    Diagnosis: mitral regurgitation    Patient location during procedure: done in OR  Timeout: 3/15/2024 7:40 AM  Procedure end time: 3/15/2024 7:45 AM    Staffing  Authorizing Provider: Jeremy Manuel MD  Performing Provider: Bri Hernandez MD    Staffing  Performed by: Bri Hernandez MD  Authorized by: Jeremy Manuel MD    Anesthesiologist was present at the time of the procedure.    Preanesthetic Checklist  Completed: patient identified, IV checked, site marked, risks and benefits discussed, surgical consent, monitors and equipment checked, pre-op evaluation, timeout performed and anesthesia consent givenArterial  Skin Prep: chlorhexidine gluconate  Local Infiltration: none and lidocaine  Orientation: left  Location: radial    Catheter Size: 20 G  Catheter placement by Ultrasound guidance. Heme positive aspiration all ports.   Vessel Caliber: patent  Needle advanced into vessel with real time Ultrasound guidance.Insertion Attempts: 1  Assessment  Dressing: secured with tape and tegaderm  Patient: Tolerated well

## 2024-03-15 NOTE — ANESTHESIA PROCEDURE NOTES
Intubation    Date/Time: 3/15/2024 7:49 AM    Performed by: Bri Hernandez MD  Authorized by: Jeremy Manuel MD    Intubation:     Induction:  Intravenous    Intubated:  Postinduction    Mask Ventilation:  Easy mask    Attempts:  1    Attempted By:  Resident anesthesiologist    Method of Intubation:  Video laryngoscopy    Blade:  Martínez 3    Laryngeal View Grade: Grade I - full view of cords      Difficult Airway Encountered?: No      Complications:  None    Airway Device:  Oral endotracheal tube    Airway Device Size:  7.0    Style/Cuff Inflation:  Cuffed (inflated to minimal occlusive pressure)    Tube secured:  22    Secured at:  The teeth    Placement Verified By:  Capnometry and Revisualization with laryngoscopy    Complicating Factors:  None    Findings Post-Intubation:  BS equal bilateral and atraumatic/condition of teeth unchanged

## 2024-03-15 NOTE — SUBJECTIVE & OBJECTIVE
Follow-up For: Procedure(s) (LRB):  REPAIR, MITRAL VALVE, OPEN (N/A)  EXCLUSION, LEFT ATRIAL APPENDAGE, OPEN, AS PART OF OPEN CHEST SURGERY (N/A)    Post-Operative Day: Day of Surgery     Past Medical History:   Diagnosis Date    Anxiety     Arthritis     GERD (gastroesophageal reflux disease)     Hyperlipidemia     Hypertension     Insomnia     Mitral valve insufficiency, unspecified etiology 2024    Thyroid disease     hypo       Past Surgical History:   Procedure Laterality Date    APPENDECTOMY      BREAST CYST ASPIRATION       SECTION, CLASSIC      EYE SURGERY Bilateral     cataracts w/iol    GALLBLADDER SURGERY      GASTRIC BYPASS      HYSTERECTOMY      TOTAL KNEE ARTHROPLASTY Right     TRANSESOPHAGEAL ECHOCARDIOGRAPHY N/A 2024    Procedure: ECHOCARDIOGRAM, TRANSESOPHAGEAL;  Surgeon: Bobby Card III, MD;  Location: [facility];  Service: Cardiology;  Laterality: N/A;       Review of patient's allergies indicates:  No Known Allergies    Family History       Problem Relation (Age of Onset)    Cirrhosis Father          Tobacco Use    Smoking status: Never    Smokeless tobacco: Never   Substance and Sexual Activity    Alcohol use: Yes     Alcohol/week: 10.0 standard drinks of alcohol     Types: 10 Glasses of wine per week    Drug use: Never    Sexual activity: Not on file      Review of Systems   Unable to perform ROS: Intubated     Objective:     Vital Signs (Most Recent):  Temp: 98.2 °F (36.8 °C) (03/15/24 1400)  Pulse: 87 (03/15/24 1415)  Resp: (!) 21 (03/15/24 1415)  BP: (!) 146/67 (03/15/24 0558)  SpO2: 95 % (03/15/24 1415) Vital Signs (24h Range):  Temp:  [98 °F (36.7 °C)-98.2 °F (36.8 °C)] 98.2 °F (36.8 °C)  Pulse:  [78-88] 87  Resp:  [13-39] 21  SpO2:  [95 %-99 %] 95 %  BP: (146)/(67) 146/67  Arterial Line BP: (100-118)/(49-60) 112/49     Weight: 100.5 kg (221 lb 9 oz)  Body mass index is 35.76 kg/m².      Intake/Output Summary (Last 24 hours) at 3/15/2024 1424  Last data filed at  3/15/2024 1410  Gross per 24 hour   Intake 3159.17 ml   Output 720 ml   Net 2439.17 ml          Physical Exam  Vitals and nursing note reviewed.   Constitutional:       Interventions: She is intubated.   HENT:      Mouth/Throat:      Comments: ETT  Cardiovascular:      Rate and Rhythm: Normal rate and regular rhythm.      Pulses: Normal pulses.      Heart sounds: Normal heart sounds.   Pulmonary:      Effort: Pulmonary effort is normal. She is intubated.      Breath sounds: Normal breath sounds.   Chest:      Comments: Chest incision CDI  2 Meds, 1 R pleural chest tubes to suction  Abdominal:      General: Abdomen is flat. Bowel sounds are normal.      Palpations: Abdomen is soft.   Genitourinary:     Comments: Lance  Skin:     General: Skin is warm.      Capillary Refill: Capillary refill takes less than 2 seconds.            Vents:  Vent Mode: A/C (03/15/24 1336)  Set Rate: 16 BPM (03/15/24 1336)  Vt Set: 360 mL (03/15/24 1336)  PEEP/CPAP: 5 cmH20 (03/15/24 1336)  Oxygen Concentration (%): 50 (03/15/24 1415)  Peak Airway Pressure: 20 cmH20 (03/15/24 1336)  Plateau Pressure: 0 cmH20 (03/15/24 1336)  Total Ve: 6.23 L/m (03/15/24 1336)  Negative Inspiratory Force (cm H2O): 0 (03/15/24 1336)    Lines/Drains/Airways       Central Venous Catheter Line  Duration              Introducer with Double Lumen 03/15/24 0800 Internal Jugular Right <1 day    Percutaneous Central Line - Triple Lumen  03/15/24 0800 Internal Jugular Right <1 day              Drain  Duration                  Chest Tube 03/15/24 1303 Tube - 3 Right Pleural 19 Fr. <1 day         NG/OG Tube 03/15/24 1401 orogastric <1 day         Urethral Catheter 03/15/24 0811 Temperature probe 16 Fr. <1 day         Y Chest Tube 1 and 2 03/15/24 1303 1 Anterior Mediastinal 24 Fr. 2 Mediastinal 19 Fr. <1 day              Airway  Duration                  Airway - Non-Surgical 03/15/24 0749 <1 day              Arterial Line  Duration             Arterial Line 03/15/24  0740 Left Radial <1 day              Line  Duration                  Pacer Wires 03/15/24 1151 <1 day              Peripheral Intravenous Line  Duration                  Peripheral IV - Single Lumen 03/15/24 0603 20 G Left;Posterior Hand <1 day         Peripheral IV - Single Lumen 03/15/24 0805 16 G Right;Posterior Forearm <1 day                    Significant Labs:    CBC/Anemia Profile:  Recent Labs   Lab 03/14/24  1354 03/15/24  1021 03/15/24  1150 03/15/24  1335 03/15/24  1354   WBC 5.37  --   --   --  15.26*  15.26*   HGB 11.7*  --   --   --  11.6*  11.6*   HCT 36.0*   < > 26* 33* 35.2*  35.2*     --   --   --  178  178   *  --   --   --  101*  101*   RDW 13.3  --   --   --  13.4  13.4    < > = values in this interval not displayed.        Chemistries:  Recent Labs   Lab 03/14/24  1354   *   K 4.1      CO2 23   BUN 24*   CREATININE 1.1   CALCIUM 9.4   ALBUMIN 3.7   PROT 6.7   BILITOT 0.4   ALKPHOS 95   ALT 25   AST 35       All pertinent labs within the past 24 hours have been reviewed.    Significant Imaging: I have reviewed all pertinent imaging results/findings within the past 24 hours.

## 2024-03-15 NOTE — HPI
Reason for Consult: Management of Hyperglycemia     Surgical Procedure and Date: Mitral valve annulopasty and left atrial appendage ligation with Dr. Bernal on 03/15/2024.    Diabetes diagnosis year: no hx of DM per chart review       HPI:   Patient is a 68 y.o. female with with a past medical history significant for anxiety, arthritis, GERD, hypertension, hyperlipidemia, hypothyroidism and HFrEF. She reports she was preparing for a knee operation and attempted to get cardiac clearance. She underwent an evaluation which demonstrated severe mitral regurgitation, severely dilated left and right atrium, and an ejection fraction of 35-40%. She denied any symptoms including dyspnea on exertion, chest pain, palpations, or lower extremity edema. The patient presents to the SICU s/p Mitral valve annulopasty and left atrial appendage ligation with Dr. Bernal on 03/15/2024. On admission, they are intubated, sedated with propfol and precedex, and in stable condition.Endocrine consulted for BG management.    Lab Results   Component Value Date    HGBA1C 5.0 03/14/2024

## 2024-03-15 NOTE — ASSESSMENT & PLAN NOTE
BG goal: 110-140    - Continue insulin infusion protocol   - POCT Glucose every hour   - Hypoglycemia protocol in place      ** Please notify Endocrine for any change and/or advance in diet**  ** Please call Endocrine for any BG related issues **     Discharge Planning:   TBD. Please notify endocrine prior to discharge.

## 2024-03-15 NOTE — SUBJECTIVE & OBJECTIVE
Interval HPI:   No acute events overnight. Patient in room 35373 CVICU/15673 CVICU A. Blood glucose stable. BG at and above goal on current insulin regimen (IIP). Steroid use- Dexamethasone  8 mg intra-op.   Day of Surgery  Renal function- Normal   Vasopressors-  Epinephrine     Diet NPO Except for: Sips with Medication     Eating:   NPO  Nausea: No  Hypoglycemia and intervention: No  Fever: No  TPN and/or TF: No    PMH, PSH, FH, SH updated and reviewed     ROS  Review of Systems   Reason unable to perform ROS: Intubated and sedated.       Current Medications and/or Treatments Impacting Glycemic Control  Immunotherapy:    Immunosuppressants       None          Steroids:   Hormones (From admission, onward)      None          Pressors:    Autonomic Drugs (From admission, onward)      Start     Stop Route Frequency Ordered    03/15/24 1330  EPINEPHrine 5 mg in dextrose 5% 250 mL infusion (premix)        Question Answer Comment   Begin at (in mcg/kg/min): 0.02    Titrate by: (in mcg/kg/min) 0.02    Titrate interval: (in minutes) 5    Titrate to maintain: (SBP or MAP or Cardiac Index) MAP    Greater than: (in mmHg) 65    Cardiac index greater than: (in L/min) 2.2    Maximum dose: (in mcg/kg/min) 2        -- IV Continuous 03/15/24 1319          Hyperglycemia/Diabetes Medications:   Antihyperglycemics (From admission, onward)      Start     Stop Route Frequency Ordered    03/15/24 1534  insulin aspart U-100 pen 0-5 Units         -- SubQ Every 6 hours PRN 03/15/24 1434    03/15/24 1330  insulin regular in 0.9 % NaCl 100 unit/100 mL (1 unit/mL) infusion        Question Answer Comment   Insulin rate changes (DO NOT MODIFY ANSWER) [email or web address] [email or web address]    Enter initial dose (Units/hr): 1        -- IV Continuous 03/15/24 1319             PHYSICAL EXAMINATION:  Vitals:    03/15/24 1517   BP:    Pulse: 88   Resp: (!) 24   Temp:      Body mass index is 35.76 kg/m².     Physical  Exam  Constitutional:       General: She is not in acute distress.     Appearance: Normal appearance. She is not ill-appearing.   HENT:      Head: Normocephalic and atraumatic.      Right Ear: Tympanic membrane normal.      Left Ear: Tympanic membrane normal.      Nose: Nose normal.   Cardiovascular:      Rate and Rhythm: Normal rate and regular rhythm.   Pulmonary:      Effort: No respiratory distress.      Comments: Intubated    Abdominal:      General: There is no distension.   Neurological:      Comments: Sedated

## 2024-03-15 NOTE — BRIEF OP NOTE
[facility] - Surgery ([facility])  Cardiothoracic Surgery  Operative Note    SUMMARY     Date of Procedure: 3/15/2024     Procedure: Procedure(s) (LRB):    1)  REPAIR, MITRAL VALVE, OPEN with central Mary Jo stitch and 32 mm Medtronic Future ring annuloplasty  03642    2)  EXCLUSION, LEFT ATRIAL APPENDAGE, OPEN, AS PART OF OPEN CHEST SURGERY 52273    Surgeon(s) and Role:     * Sapphire Bernal MD - Primary     * Claudia Cedeño MD - Resident - Assisting     * Israel Esposito DO - Fellow        Pre-Operative Diagnosis: Nonrheumatic mitral valve regurgitation [I34.0]    Post-Operative Diagnosis: Post-Op Diagnosis Codes:     * Nonrheumatic mitral valve regurgitation [I34.0]    Anesthesia: General    Operative Findings (including complications, if any): Dilated annulus with mild bileaflet proapse    Description of Technical Procedures: Complete ring with some residual central MR. No MR after Alfieiri    Estimated Blood Loss (EBL): 100 mL         Implants:   Implant Name Type Inv. Item Serial No.  Lot No. LRB No. Used Action   RING FUTURE MITRAL CG 32MM - [id number]  RING FUTURE MITRAL CG 32MM [id number] [facility]  N/A 1 Implanted       Specimens:   Specimen (24h ago, onward)       Start     Ordered    03/15/24 1108  Specimen to Pathology, Surgery Cardiovascular  Once        Comments: Pre-op Diagnosis: Nonrheumatic mitral valve regurgitation [I34.0]Procedure(s):REPAIR, MITRAL VALVE, OPENEXCLUSION, LEFT ATRIAL APPENDAGE, OPEN, AS PART OF OPEN CHEST SURGERYREPLACEMENT, MITRAL VALVE Number of specimens: 1Name of specimens: 1. LEFT ATRIAL APPENDAGE     References:    Click here for ordering Quick Tip   Question Answer Comment   Procedure Type: Cardiovascular    Specimen Class: Routine/Screening    Which provider would you like to cc? SAPPHIRE BERNAL    Release to patient Immediate        03/15/24 1107                   Attestation:  I was present and scrubbed for the procedure.

## 2024-03-15 NOTE — ANESTHESIA PROCEDURE NOTES
Peripheral Block    Patient location during procedure: pre-op   Block not for primary anesthetic.  Reason for block: at surgeon's request and post-op pain management   Post-op Pain Location: abdominal pain   Start time: 3/15/2024 7:56 AM  Timeout: 3/15/2024 7:55 AM   End time: 3/15/2024 8:00 AM    Staffing  Authorizing Provider: Jeremy Manuel MD  Performing Provider: Bri Hernandez MD    Staffing  Performed by: Bri Hernandez MD  Authorized by: Jeremy Manuel MD    Preanesthetic Checklist  Completed: patient identified, IV checked, site marked, risks and benefits discussed, surgical consent, monitors and equipment checked, pre-op evaluation and timeout performed  Peripheral Block  Patient position: supine  Prep: ChloraPrep  Patient monitoring: heart rate, cardiac monitor, continuous pulse ox, continuous capnometry and frequent blood pressure checks  Block type: Transversus thoracis  Laterality: bilateral  Injection technique: single shot  Needle  Needle type: Stimuplex   Needle gauge: 22 G  Needle length: 2 in  Needle localization: anatomical landmarks and ultrasound guidance   -ultrasound image captured on disc.  Assessment  Injection assessment: negative aspiration, negative parasthesia and local visualized surrounding nerve  Paresthesia pain: none  Heart rate change: no  Slow fractionated injection: yes    Medications:    Medications: bupivacaine (pf) (MARCAINE) injection 0.5% - Perineural   30 mL - 3/15/2024 7:57:00 AM    Additional Notes  VSS.  Vitals monitored throughout procedure - see record.  Patient tolerated procedure well.

## 2024-03-16 LAB
ALBUMIN SERPL BCP-MCNC: 3.4 G/DL (ref 3.5–5.2)
ALP SERPL-CCNC: 54 U/L (ref 55–135)
ALT SERPL W/O P-5'-P-CCNC: 14 U/L (ref 10–44)
ANION GAP SERPL CALC-SCNC: 8 MMOL/L (ref 8–16)
APTT PPP: <21 SEC (ref 21–32)
AST SERPL-CCNC: 49 U/L (ref 10–40)
BASOPHILS # BLD AUTO: 0 K/UL (ref 0–0.2)
BASOPHILS NFR BLD: 0 % (ref 0–1.9)
BILIRUB SERPL-MCNC: 0.3 MG/DL (ref 0.1–1)
BUN SERPL-MCNC: 20 MG/DL (ref 8–23)
CALCIUM SERPL-MCNC: 8.8 MG/DL (ref 8.7–10.5)
CHLORIDE SERPL-SCNC: 108 MMOL/L (ref 95–110)
CO2 SERPL-SCNC: 17 MMOL/L (ref 23–29)
CREAT SERPL-MCNC: 1.2 MG/DL (ref 0.5–1.4)
DIFFERENTIAL METHOD BLD: ABNORMAL
EOSINOPHIL # BLD AUTO: 0 K/UL (ref 0–0.5)
EOSINOPHIL NFR BLD: 0 % (ref 0–8)
ERYTHROCYTE [DISTWIDTH] IN BLOOD BY AUTOMATED COUNT: 13.9 % (ref 11.5–14.5)
EST. GFR  (NO RACE VARIABLE): 49.3 ML/MIN/1.73 M^2
GLUCOSE SERPL-MCNC: 106 MG/DL (ref 70–110)
GLUCOSE SERPL-MCNC: 152 MG/DL (ref 70–110)
GLUCOSE SERPL-MCNC: 204 MG/DL (ref 70–110)
HCO3 UR-SCNC: 19.1 MMOL/L (ref 24–28)
HCO3 UR-SCNC: 19.2 MMOL/L (ref 24–28)
HCT VFR BLD AUTO: 30.2 % (ref 37–48.5)
HCT VFR BLD CALC: 27 %PCV (ref 36–54)
HCT VFR BLD CALC: 31 %PCV (ref 36–54)
HGB BLD-MCNC: 9.7 G/DL (ref 12–16)
IMM GRANULOCYTES # BLD AUTO: 0.04 K/UL (ref 0–0.04)
IMM GRANULOCYTES NFR BLD AUTO: 0.4 % (ref 0–0.5)
INR PPP: 1 (ref 0.8–1.2)
LYMPHOCYTES # BLD AUTO: 1.1 K/UL (ref 1–4.8)
LYMPHOCYTES NFR BLD: 10.6 % (ref 18–48)
MAGNESIUM SERPL-MCNC: 1.9 MG/DL (ref 1.6–2.6)
MCH RBC QN AUTO: 33.1 PG (ref 27–31)
MCHC RBC AUTO-ENTMCNC: 32.1 G/DL (ref 32–36)
MCV RBC AUTO: 103 FL (ref 82–98)
MONOCYTES # BLD AUTO: 1 K/UL (ref 0.3–1)
MONOCYTES NFR BLD: 9.6 % (ref 4–15)
NEUTROPHILS # BLD AUTO: 8 K/UL (ref 1.8–7.7)
NEUTROPHILS NFR BLD: 79.4 % (ref 38–73)
NRBC BLD-RTO: 0 /100 WBC
PCO2 BLDA: 34.9 MMHG (ref 35–45)
PCO2 BLDA: 35.8 MMHG (ref 35–45)
PH SMN: 7.34 [PH] (ref 7.35–7.45)
PH SMN: 7.34 [PH] (ref 7.35–7.45)
PHOSPHATE SERPL-MCNC: 4.6 MG/DL (ref 2.7–4.5)
PLATELET # BLD AUTO: 155 K/UL (ref 150–450)
PMV BLD AUTO: 10.5 FL (ref 9.2–12.9)
PO2 BLDA: 125 MMHG (ref 80–100)
PO2 BLDA: 230 MMHG (ref 80–100)
POC BE: -7 MMOL/L
POC BE: -7 MMOL/L
POC IONIZED CALCIUM: 1.28 MMOL/L (ref 1.06–1.42)
POC IONIZED CALCIUM: 1.37 MMOL/L (ref 1.06–1.42)
POC SATURATED O2: 100 % (ref 95–100)
POC SATURATED O2: 99 % (ref 95–100)
POC TCO2: 20 MMOL/L (ref 23–27)
POC TCO2: 20 MMOL/L (ref 23–27)
POCT GLUCOSE: 103 MG/DL (ref 70–110)
POCT GLUCOSE: 103 MG/DL (ref 70–110)
POCT GLUCOSE: 112 MG/DL (ref 70–110)
POCT GLUCOSE: 121 MG/DL (ref 70–110)
POCT GLUCOSE: 127 MG/DL (ref 70–110)
POCT GLUCOSE: 132 MG/DL (ref 70–110)
POCT GLUCOSE: 135 MG/DL (ref 70–110)
POCT GLUCOSE: 139 MG/DL (ref 70–110)
POCT GLUCOSE: 153 MG/DL (ref 70–110)
POCT GLUCOSE: 154 MG/DL (ref 70–110)
POCT GLUCOSE: 157 MG/DL (ref 70–110)
POCT GLUCOSE: 176 MG/DL (ref 70–110)
POTASSIUM BLD-SCNC: 3.7 MMOL/L (ref 3.5–5.1)
POTASSIUM BLD-SCNC: 3.8 MMOL/L (ref 3.5–5.1)
POTASSIUM SERPL-SCNC: 4.4 MMOL/L (ref 3.5–5.1)
PROT SERPL-MCNC: 5.6 G/DL (ref 6–8.4)
PROTHROMBIN TIME: 10.7 SEC (ref 9–12.5)
RBC # BLD AUTO: 2.93 M/UL (ref 4–5.4)
SAMPLE: ABNORMAL
SAMPLE: ABNORMAL
SODIUM BLD-SCNC: 136 MMOL/L (ref 136–145)
SODIUM BLD-SCNC: 136 MMOL/L (ref 136–145)
SODIUM SERPL-SCNC: 133 MMOL/L (ref 136–145)
WBC # BLD AUTO: 10.02 K/UL (ref 3.9–12.7)

## 2024-03-16 PROCEDURE — 25000003 PHARM REV CODE 250: Performed by: STUDENT IN AN ORGANIZED HEALTH CARE EDUCATION/TRAINING PROGRAM

## 2024-03-16 PROCEDURE — 25000003 PHARM REV CODE 250

## 2024-03-16 PROCEDURE — 85610 PROTHROMBIN TIME: CPT | Performed by: STUDENT IN AN ORGANIZED HEALTH CARE EDUCATION/TRAINING PROGRAM

## 2024-03-16 PROCEDURE — 97116 GAIT TRAINING THERAPY: CPT

## 2024-03-16 PROCEDURE — 94799 UNLISTED PULMONARY SVC/PX: CPT | Mod: XB

## 2024-03-16 PROCEDURE — C9113 INJ PANTOPRAZOLE SODIUM, VIA: HCPCS | Performed by: STUDENT IN AN ORGANIZED HEALTH CARE EDUCATION/TRAINING PROGRAM

## 2024-03-16 PROCEDURE — 20600001 HC STEP DOWN PRIVATE ROOM

## 2024-03-16 PROCEDURE — P9045 ALBUMIN (HUMAN), 5%, 250 ML: HCPCS | Mod: JZ,JG | Performed by: PHYSICIAN ASSISTANT

## 2024-03-16 PROCEDURE — 25000242 PHARM REV CODE 250 ALT 637 W/ HCPCS: Performed by: PHYSICIAN ASSISTANT

## 2024-03-16 PROCEDURE — 20000000 HC ICU ROOM

## 2024-03-16 PROCEDURE — 97530 THERAPEUTIC ACTIVITIES: CPT

## 2024-03-16 PROCEDURE — 99291 CRITICAL CARE FIRST HOUR: CPT | Mod: ,,, | Performed by: ANESTHESIOLOGY

## 2024-03-16 PROCEDURE — 25000003 PHARM REV CODE 250: Performed by: PHYSICIAN ASSISTANT

## 2024-03-16 PROCEDURE — 63600175 PHARM REV CODE 636 W HCPCS: Performed by: STUDENT IN AN ORGANIZED HEALTH CARE EDUCATION/TRAINING PROGRAM

## 2024-03-16 PROCEDURE — 94761 N-INVAS EAR/PLS OXIMETRY MLT: CPT

## 2024-03-16 PROCEDURE — 99233 SBSQ HOSP IP/OBS HIGH 50: CPT | Mod: ,,,

## 2024-03-16 PROCEDURE — 25000003 PHARM REV CODE 250: Performed by: ANESTHESIOLOGY

## 2024-03-16 PROCEDURE — 63600175 PHARM REV CODE 636 W HCPCS: Mod: JZ,JG | Performed by: PHYSICIAN ASSISTANT

## 2024-03-16 PROCEDURE — 99900035 HC TECH TIME PER 15 MIN (STAT)

## 2024-03-16 PROCEDURE — 63600175 PHARM REV CODE 636 W HCPCS

## 2024-03-16 PROCEDURE — 85730 THROMBOPLASTIN TIME PARTIAL: CPT | Performed by: STUDENT IN AN ORGANIZED HEALTH CARE EDUCATION/TRAINING PROGRAM

## 2024-03-16 PROCEDURE — 97162 PT EVAL MOD COMPLEX 30 MIN: CPT

## 2024-03-16 PROCEDURE — 94640 AIRWAY INHALATION TREATMENT: CPT

## 2024-03-16 PROCEDURE — C9248 INJ, CLEVIDIPINE BUTYRATE: HCPCS

## 2024-03-16 PROCEDURE — 85025 COMPLETE CBC W/AUTO DIFF WBC: CPT | Performed by: PHYSICIAN ASSISTANT

## 2024-03-16 PROCEDURE — 97165 OT EVAL LOW COMPLEX 30 MIN: CPT

## 2024-03-16 PROCEDURE — 27000221 HC OXYGEN, UP TO 24 HOURS

## 2024-03-16 PROCEDURE — 84100 ASSAY OF PHOSPHORUS: CPT | Performed by: STUDENT IN AN ORGANIZED HEALTH CARE EDUCATION/TRAINING PROGRAM

## 2024-03-16 PROCEDURE — 80053 COMPREHEN METABOLIC PANEL: CPT | Performed by: STUDENT IN AN ORGANIZED HEALTH CARE EDUCATION/TRAINING PROGRAM

## 2024-03-16 PROCEDURE — 83735 ASSAY OF MAGNESIUM: CPT | Performed by: STUDENT IN AN ORGANIZED HEALTH CARE EDUCATION/TRAINING PROGRAM

## 2024-03-16 RX ORDER — INSULIN ASPART 100 [IU]/ML
0-10 INJECTION, SOLUTION INTRAVENOUS; SUBCUTANEOUS
Status: DISCONTINUED | OUTPATIENT
Start: 2024-03-16 | End: 2024-03-18

## 2024-03-16 RX ORDER — IBUPROFEN 200 MG
16 TABLET ORAL
Status: DISCONTINUED | OUTPATIENT
Start: 2024-03-16 | End: 2024-03-18

## 2024-03-16 RX ORDER — GLUCAGON 1 MG
1 KIT INJECTION
Status: DISCONTINUED | OUTPATIENT
Start: 2024-03-16 | End: 2024-03-18

## 2024-03-16 RX ORDER — HYDROMORPHONE HYDROCHLORIDE 1 MG/ML
0.2 INJECTION, SOLUTION INTRAMUSCULAR; INTRAVENOUS; SUBCUTANEOUS EVERY 6 HOURS PRN
Status: DISCONTINUED | OUTPATIENT
Start: 2024-03-16 | End: 2024-03-18

## 2024-03-16 RX ORDER — IBUPROFEN 200 MG
24 TABLET ORAL
Status: DISCONTINUED | OUTPATIENT
Start: 2024-03-16 | End: 2024-03-18

## 2024-03-16 RX ORDER — METOPROLOL TARTRATE 25 MG/1
25 TABLET, FILM COATED ORAL 2 TIMES DAILY
Status: DISCONTINUED | OUTPATIENT
Start: 2024-03-16 | End: 2024-03-20

## 2024-03-16 RX ORDER — METOPROLOL TARTRATE 25 MG/1
25 TABLET, FILM COATED ORAL 2 TIMES DAILY
Status: DISCONTINUED | OUTPATIENT
Start: 2024-03-16 | End: 2024-03-16

## 2024-03-16 RX ORDER — EZETIMIBE 10 MG/1
10 TABLET ORAL NIGHTLY
Status: DISCONTINUED | OUTPATIENT
Start: 2024-03-16 | End: 2024-03-25 | Stop reason: HOSPADM

## 2024-03-16 RX ADMIN — METHOCARBAMOL 500 MG: 500 TABLET ORAL at 08:03

## 2024-03-16 RX ADMIN — IPRATROPIUM BROMIDE AND ALBUTEROL SULFATE 3 ML: .5; 3 SOLUTION RESPIRATORY (INHALATION) at 12:03

## 2024-03-16 RX ADMIN — IPRATROPIUM BROMIDE AND ALBUTEROL SULFATE 3 ML: .5; 3 SOLUTION RESPIRATORY (INHALATION) at 04:03

## 2024-03-16 RX ADMIN — HYDROMORPHONE HYDROCHLORIDE 0.2 MG: 0.5 INJECTION, SOLUTION INTRAMUSCULAR; INTRAVENOUS; SUBCUTANEOUS at 03:03

## 2024-03-16 RX ADMIN — MUPIROCIN 1 G: 20 OINTMENT TOPICAL at 09:03

## 2024-03-16 RX ADMIN — IPRATROPIUM BROMIDE AND ALBUTEROL SULFATE 3 ML: .5; 3 SOLUTION RESPIRATORY (INHALATION) at 08:03

## 2024-03-16 RX ADMIN — ALBUMIN (HUMAN) 25 G: 12.5 SOLUTION INTRAVENOUS at 02:03

## 2024-03-16 RX ADMIN — OXYCODONE HYDROCHLORIDE 10 MG: 10 TABLET ORAL at 05:03

## 2024-03-16 RX ADMIN — FAMOTIDINE 20 MG: 20 TABLET, FILM COATED ORAL at 08:03

## 2024-03-16 RX ADMIN — CLEVIPIDINE 1 MG/HR: 0.5 EMULSION INTRAVENOUS at 03:03

## 2024-03-16 RX ADMIN — HYDROMORPHONE HYDROCHLORIDE 0.5 MG: 0.5 INJECTION, SOLUTION INTRAMUSCULAR; INTRAVENOUS; SUBCUTANEOUS at 12:03

## 2024-03-16 RX ADMIN — METOPROLOL TARTRATE 25 MG: 25 TABLET, FILM COATED ORAL at 08:03

## 2024-03-16 RX ADMIN — EZETIMIBE 10 MG: 10 TABLET ORAL at 09:03

## 2024-03-16 RX ADMIN — PANTOPRAZOLE SODIUM 40 MG: 40 INJECTION, POWDER, FOR SOLUTION INTRAVENOUS at 08:03

## 2024-03-16 RX ADMIN — TRAZODONE HYDROCHLORIDE 100 MG: 100 TABLET ORAL at 09:03

## 2024-03-16 RX ADMIN — CEFAZOLIN 2 G: 2 INJECTION, POWDER, FOR SOLUTION INTRAMUSCULAR; INTRAVENOUS at 12:03

## 2024-03-16 RX ADMIN — OXYCODONE HYDROCHLORIDE 10 MG: 10 TABLET ORAL at 12:03

## 2024-03-16 RX ADMIN — HYDROMORPHONE HYDROCHLORIDE 0.5 MG: 0.5 INJECTION, SOLUTION INTRAMUSCULAR; INTRAVENOUS; SUBCUTANEOUS at 04:03

## 2024-03-16 RX ADMIN — ACETAMINOPHEN 1000 MG: 500 TABLET ORAL at 05:03

## 2024-03-16 RX ADMIN — MAGNESIUM SULFATE HEPTAHYDRATE 2 G: 40 INJECTION, SOLUTION INTRAVENOUS at 05:03

## 2024-03-16 RX ADMIN — ACETAMINOPHEN 1000 MG: 500 TABLET ORAL at 09:03

## 2024-03-16 RX ADMIN — METOCLOPRAMIDE 5 MG: 5 INJECTION, SOLUTION INTRAMUSCULAR; INTRAVENOUS at 04:03

## 2024-03-16 RX ADMIN — ONDANSETRON 4 MG: 2 INJECTION INTRAMUSCULAR; INTRAVENOUS at 09:03

## 2024-03-16 RX ADMIN — OXYCODONE HYDROCHLORIDE 10 MG: 10 TABLET ORAL at 06:03

## 2024-03-16 RX ADMIN — EPINEPHRINE 0.02 MCG/KG/MIN: 1 INJECTION INTRAMUSCULAR; INTRAVENOUS; SUBCUTANEOUS at 02:03

## 2024-03-16 RX ADMIN — ASPIRIN 325 MG: 325 TABLET, COATED ORAL at 08:03

## 2024-03-16 RX ADMIN — DOCUSATE SODIUM 100 MG: 100 CAPSULE, LIQUID FILLED ORAL at 08:03

## 2024-03-16 RX ADMIN — MUPIROCIN 1 G: 20 OINTMENT TOPICAL at 08:03

## 2024-03-16 RX ADMIN — CEFAZOLIN 2 G: 2 INJECTION, POWDER, FOR SOLUTION INTRAMUSCULAR; INTRAVENOUS at 08:03

## 2024-03-16 RX ADMIN — HYDROMORPHONE HYDROCHLORIDE 0.5 MG: 0.5 INJECTION, SOLUTION INTRAMUSCULAR; INTRAVENOUS; SUBCUTANEOUS at 08:03

## 2024-03-16 RX ADMIN — DOCUSATE SODIUM 100 MG: 100 CAPSULE, LIQUID FILLED ORAL at 09:03

## 2024-03-16 RX ADMIN — POLYETHYLENE GLYCOL 3350 17 G: 17 POWDER, FOR SOLUTION ORAL at 08:03

## 2024-03-16 RX ADMIN — HYDROMORPHONE HYDROCHLORIDE 0.2 MG: 0.5 INJECTION, SOLUTION INTRAMUSCULAR; INTRAVENOUS; SUBCUTANEOUS at 10:03

## 2024-03-16 RX ADMIN — CEFAZOLIN 2 G: 2 INJECTION, POWDER, FOR SOLUTION INTRAMUSCULAR; INTRAVENOUS at 03:03

## 2024-03-16 RX ADMIN — METOPROLOL TARTRATE 25 MG: 25 TABLET, FILM COATED ORAL at 09:03

## 2024-03-16 RX ADMIN — OXYCODONE HYDROCHLORIDE 10 MG: 10 TABLET ORAL at 01:03

## 2024-03-16 NOTE — PT/OT/SLP EVAL
Occupational Therapy  Co- Evaluation/tx    Name: Mora Adrian  MRN: [id number]  Admitting Diagnosis: Nonrheumatic mitral valve regurgitation  Recent Surgery: Procedure(s) (LRB):  REPAIR, MITRAL VALVE, OPEN (N/A)  EXCLUSION, LEFT ATRIAL APPENDAGE, OPEN, AS PART OF OPEN CHEST SURGERY (N/A) 1 Day Post-Op  Co-eval performed on this date to accommodate for pt. Activity tolerance and to ensure pt. safety  Recommendations:     Discharge Recommendations: Low Intensity Therapy  Discharge Equipment Recommendations:  none  Barriers to discharge:  None    Assessment:     Mora Adrian is a 68 y.o. female with a medical diagnosis of Nonrheumatic mitral valve regurgitation.  She presents with deficits in mobility and self-care tasks. Nausea noted with ambulation. Patient would benefit from continued OT services to maximize level of safety and independence with self-care tasks.   . Performance deficits affecting function: weakness, impaired endurance, impaired self care skills, impaired functional mobility, gait instability, impaired cardiopulmonary response to activity, pain.      Rehab Prognosis: Good; patient would benefit from acute skilled OT services to address these deficits and reach maximum level of function.       Plan:     Patient to be seen 5 x/week to address the above listed problems via self-care/home management, therapeutic activities, therapeutic exercises, neuromuscular re-education  Plan of Care Expires: 04/15/24  Plan of Care Reviewed with: patient, daughter    Subjective     Chief Complaint: Pt. Reported having discomfort at chest tube site as well as nausea  Patient/Family Comments/goals: to get better    Occupational Profile:  Living Environment: pt. Resides alone in  house with threshold step to enter. Daughter lives close by. Pt. Has a shower room and believes she has a seat available  Previous level of function: Pt. Was completely Independent PTA. Pt. Was being worked up for knee sx  Roles and  Routines: caretaker of self, mother, daughter, community dweller, + drives, goes out to eat, has 2 cats, gardening  Equipment Used at Home: none (has access to  and BSC and possibly shower chair)  Assistance upon Discharge: daughter lives close and also reported that  her mother could assist as well     Pain/Comfort:  Pain Rating 1: 8/10  Location 1:  (chest tube site)  Pain Addressed 1: Reposition, Distraction, Pre-medicate for activity, Nurse notified  Pain Rating Post-Intervention 1: 8/10    Patients cultural, spiritual, Confucianist conflicts given the current situation: no    Objective:     Communicated with: nurse prior to session.  Patient found up in chair with arterial line, chest tube, pulse ox (continuous), telemetry, central line, bennett catheter upon OT entry to room.    General Precautions: Standard, fall, sternal (cardiac)  Orthopedic Precautions: N/A  Braces: N/A  Respiratory Status: Room air    Occupational Performance:    Bed Mobility:    Not tested as pt. Was up in chair    Functional Mobility/Transfers:  Patient completed Sit <> Stand Transfer with moderate assistance  with  no assistive device and vc's to maintain sternal precautions  Functional Mobility: pt. Ambulated ~ 20 feet Total with Min A but did become nauseated     Activities of Daily Living:  Grooming: declined 2/2 nausea      Cognitive/Visual Perceptual:  Cognitive/Psychosocial Skills:     -       Oriented to: Person, Place, Time, and Situation   -       Follows Commands/attention:Follows multistep  commands  -       Communication: clear/fluent  -       Memory: No Deficits noted  -       Safety awareness/insight to disability: intact   -       Mood/Affect/Coping skills/emotional control: Appropriate to situation  Visual/Perceptual:      -Intact .    Physical Exam:  Balance: -       sit: good; stand: min/Mod A  Postural examination/scapula alignment:    -       Rounded shoulders  -       Posterior pelvic tilt  Skin integrity: sx site  with dressing  Upper Extremity Range of Motion:     -       Right Upper Extremity: WFL  -       Left Upper Extremity: WFL   Strength:    -       Right Upper Extremity: WFL  -       Left Upper Extremity: WFL    AMPAC 6 Click ADL:  AMPAC Total Score: 16    Treatment & Education:  Pt. And family educated on role of OT and POC  Pt. And family educated on sternal precautions    Patient left up in chair with all lines intact, call button in reach, nurse notified, and family present    GOALS:   Multidisciplinary Problems       Occupational Therapy Goals          Problem: Occupational Therapy    Goal Priority Disciplines Outcome Interventions   Occupational Therapy Goal     OT, PT/OT Ongoing, Progressing    Description: Goals to be met by: 24     Patient will increase functional independence with ADLs by performing:    UE Dressing with [place].  LE Dressing with Supervision.  Grooming while standing at sink with Supervision.  Toileting from toilet with Supervision for hygiene and clothing management.   Supine to sit with Supervision.  Stand pivot transfers with Supervision.  Toilet transfer to toilet with Supervision.  Pt. To recall sternal precautions with 100% accuracy                         History:     Past Medical History:   Diagnosis Date    Anxiety     Arthritis     GERD (gastroesophageal reflux disease)     Hyperlipidemia     Hypertension     Insomnia     Mitral valve insufficiency, unspecified etiology 2024    Thyroid disease     hypo         Past Surgical History:   Procedure Laterality Date    APPENDECTOMY      BREAST CYST ASPIRATION       SECTION, CLASSIC      EXCLUSION, LEFT ATRIAL APPENDAGE, OPEN, AS PART OF OPEN CHEST SURGERY N/A 3/15/2024    Procedure: EXCLUSION, LEFT ATRIAL APPENDAGE, OPEN, AS PART OF OPEN CHEST SURGERY;  Surgeon: Pedrito Bernal MD;  Location: [facility] OR [address] [place] [address];  Service: Cardiothoracic;  Laterality: N/A;    EYE SURGERY Bilateral     cataracts w/iol     GALLBLADDER SURGERY      GASTRIC BYPASS      HYSTERECTOMY      REPAIR, MITRAL VALVE, OPEN N/A 3/15/2024    Procedure: REPAIR, MITRAL VALVE, OPEN;  Surgeon: Pedrito Bernal MD;  Location: [address];  Service: Cardiothoracic;  Laterality: N/A;    TOTAL KNEE ARTHROPLASTY Right     TRANSESOPHAGEAL ECHOCARDIOGRAPHY N/A 1/25/2024    Procedure: ECHOCARDIOGRAM, TRANSESOPHAGEAL;  Surgeon: Bobby Card III, MD;  Location: [facility];  Service: Cardiology;  Laterality: N/A;       Time Tracking:     OT Date of Treatment: 03/16/24  OT Start Time: 0840  OT Stop Time: 0908  OT Total Time (min): 28 min    Billable Minutes:Evaluation 15  Therapeutic Activity 13    3/16/2024

## 2024-03-16 NOTE — ASSESSMENT & PLAN NOTE
Mora Adrian is a 68 y.o. female with a past medical history significant for anxiety, arthritis, GERD, hypertension, hyperlipidemia, hypothyroidism and HFrEF. She reports she was preparing for a knee operation and attempted to get cardiac clearance. She underwent an evaluation which demonstrated severe mitral regurgitation, severely dilated left and right atrium, and an ejection fraction of 35-40%. She denied any symptoms including dyspnea on exertion, chest pain, palpations, or lower extremity edema.        Neuro/Psych:     - Sedation:  n/a    - Pain:    - Scheduled Tylenol 1g q8h   - robaxin, Oxy PRN             Cardiac:     - S/P Mitral Valve repair and Left Atrial Appendage Ligation with Dr. Bernal on 3/15/24    - BP Goal: MAP 60-80     - Cleviprex PRN    - Pressors: Epi 0.04    - Anti-HTNs: Will start when appropriate    - Rhythm: NSR    - Beta blocker: Will start when appropriate    - Statin: Atorvastatin 40      Pulmonary:     - Goal SpO2 >92%    - Will wean ventilator support as tolerated to extubate    - Chest Tubes x 3 (2 Meds & 1 R Pleural)    - ABGs PRN      Renal:    - Trend BUN/Cr     - Maintain Lance, record strict Is/Os    Recent Labs   Lab 03/14/24  1354 03/15/24  1354 03/16/24  0340   BUN 24* 17  17 20   CREATININE 1.1 0.8  0.8 1.2           FEN / GI:     - Daily CMP, PRN K/Mag/Phos per protocol     - Replace electrolytes as needed    - Nutrition: ADAT today    - Bowel Regimen: Miralax, docusate      ID:     - Afebrile    - WBC stable    - Abx: Complete perioperative cefazolin 2g Q8H x 5 doses    Recent Labs   Lab 03/14/24  1354 03/15/24  1354 03/16/24  0340   WBC 5.37 15.26*  15.26* 10.02           Heme/Onc:     - Hgb 11.7 pre-operatively    - CBC daily    - ASA 325mg daily    Recent Labs   Lab 03/14/24  1354 03/15/24  1354 03/16/24  0340   HGB 11.7* 11.6*  11.6* 9.7*    178  178 155   APTT 27.2 28.5  28.5 <21.0   INR 1.0 1.1  1.1 1.0           Endocrine:     - CTS Goal BG  110-140    - HgbA1c:     - Endocrinology consulted for insulin management      PPx:   Feeding: Tolerating CLAD, ADAT today  Analgesia/Sedation: Multimodal  Thromboembolic Prevention: SCD's and PT/OT  HOB >30: Yes  Stress Ulcer: Home Protonix  Glucose Control: Yes, insulin management per Endocrinology     Lines/Drains/Airway:   ETT   Left radial arterial line   RIJ CVC   Lance   Chest Tubes: x2 (2 Mediastinal, 1 R Pleural)    Pacing Wires: Temporary ventricular pacing wires      Dispo/Code Status/Palliative:     - SICU    - Full Code

## 2024-03-16 NOTE — ANESTHESIA POSTPROCEDURE EVALUATION
Anesthesia Post Evaluation    Patient: Mora Adrian    Procedure(s) Performed: Procedure(s) (LRB):  REPAIR, MITRAL VALVE, OPEN (N/A)  EXCLUSION, LEFT ATRIAL APPENDAGE, OPEN, AS PART OF OPEN CHEST SURGERY (N/A)    Final Anesthesia Type: general      Patient location during evaluation: ICU  Patient participation: Yes- Able to Participate  Level of consciousness: awake and alert  Post-procedure vital signs: reviewed and stable  Pain management: adequate  Airway patency: patent  CARSON mitigation strategies: Multimodal analgesia  PONV status at discharge: No PONV  Anesthetic complications: no      Cardiovascular status: hemodynamically stable  Respiratory status: nasal cannula  Hydration status: euvolemic  Follow-up not needed.              Vitals Value Taken Time   /59 03/15/24 1923   Temp 36.8 °C (98.2 °F) 03/15/24 1400   Pulse 87 03/15/24 1944   Resp 14 03/15/24 1944   SpO2 93 % 03/15/24 1944   Vitals shown include unvalidated device data.      No case tracking events are documented in the log.      Pain/Tristen Score: Pain Rating Prior to Med Admin: 6 (3/15/2024  5:16 PM)

## 2024-03-16 NOTE — PROGRESS NOTES
"[facility] - Surgical Intensive Care  Endocrinology  Progress Note    Admit Date: 3/15/2024     Reason for Consult: Management of Hyperglycemia     Surgical Procedure and Date: Mitral valve annulopasty and left atrial appendage ligation with Dr. Bernal on 03/15/2024.    Diabetes diagnosis year: no hx of DM per chart review       HPI:   Patient is a 68 y.o. female with with a past medical history significant for anxiety, arthritis, GERD, hypertension, hyperlipidemia, hypothyroidism and HFrEF. She reports she was preparing for a knee operation and attempted to get cardiac clearance. She underwent an evaluation which demonstrated severe mitral regurgitation, severely dilated left and right atrium, and an ejection fraction of 35-40%. She denied any symptoms including dyspnea on exertion, chest pain, palpations, or lower extremity edema. The patient presents to the SICU s/p Mitral valve annulopasty and left atrial appendage ligation with Dr. Bernal on 03/15/2024. On admission, they are intubated, sedated with propfol and precedex, and in stable condition.Endocrine consulted for BG management.    Lab Results   Component Value Date    HGBA1C 5.0 03/14/2024         Interval HPI:   No acute events overnight. Patient in room 77424 CVICU/77955 CVICU A. Blood glucose stable. BG at goal on current insulin regimen (IIP). Steroid use- None.   1 Day Post-Op  Renal function- Normal   Vasopressors-  Epinephrine     Diet Cardiac Fluid - 1500mL; Standard Tray     Eating:   <25%  Nausea: No  Hypoglycemia and intervention: No  Fever: No  TPN and/or TF: No      BP (!) 158/89   Pulse 110   Temp 98.1 °F (36.7 °C)   Resp (!) 29   Ht 5' 6" (1.676 m)   Wt 100.5 kg (221 lb 9 oz)   SpO2 99%   Breastfeeding No   BMI 35.76 kg/m²     Labs Reviewed and Include    Recent Labs   Lab 03/16/24  0340   *   CALCIUM 8.8   ALBUMIN 3.4*   PROT 5.6*   *   K 4.4   CO2 17*      BUN 20   CREATININE 1.2   ALKPHOS 54*   ALT 14   AST 49* " "  BILITOT 0.3     Lab Results   Component Value Date    WBC 10.02 03/16/2024    HGB 9.7 (L) 03/16/2024    HCT 30.2 (L) 03/16/2024     (H) 03/16/2024     03/16/2024     No results for input(s): "TSH", "FREET4" in the last 168 hours.  Lab Results   Component Value Date    HGBA1C 5.0 03/14/2024       Nutritional status:   Body mass index is 35.76 kg/m².  Lab Results   Component Value Date    ALBUMIN 3.4 (L) 03/16/2024    ALBUMIN 3.0 (L) 03/15/2024    ALBUMIN 3.0 (L) 03/15/2024     No results found for: "PREALBUMIN"    Estimated Creatinine Clearance: 53.7 mL/min (based on SCr of 1.2 mg/dL).    Accu-Checks  Recent Labs     03/16/24  0016 03/16/24  0059 03/16/24  0213 03/16/24  0333 03/16/24  0440 03/16/24  0444 03/16/24  0536 03/16/24  0642 03/16/24  0724 03/16/24  0846   POCTGLUCOSE 139* 135* 132* 157* 176* 127* 103 103 153* 154*       Current Medications and/or Treatments Impacting Glycemic Control  Immunotherapy:    Immunosuppressants       None          Steroids:   Hormones (From admission, onward)      None          Pressors:    Autonomic Drugs (From admission, onward)      Start     Stop Route Frequency Ordered    03/15/24 1330  EPINEPHrine 5 mg in dextrose 5% 250 mL infusion (premix)        Question Answer Comment   Begin at (in mcg/kg/min): 0.02    Titrate by: (in mcg/kg/min) 0.02    Titrate interval: (in minutes) 5    Titrate to maintain: (SBP or MAP or Cardiac Index) MAP    Greater than: (in mmHg) 65    Cardiac index greater than: (in L/min) 2.2    Maximum dose: (in mcg/kg/min) 2        -- IV Continuous 03/15/24 1319          Hyperglycemia/Diabetes Medications:   Antihyperglycemics (From admission, onward)      Start     Stop Route Frequency Ordered    03/16/24 1039  insulin aspart U-100 pen 0-10 Units         -- SubQ Before meals & nightly PRN 03/16/24 6545            ASSESSMENT and PLAN    Cardiac/Vascular  * Nonrheumatic mitral valve regurgitation    Managed per primary " team      Endocrine  Transient hyperglycemia post procedure  BG goal: 110-140    - d/c IIP as BG stable at/below goal   - Start MDC (150/25) prn ac/hs   - POCT glucose ac/hs   - Hypoglycemia protocol in place      ** Please notify Endocrine for any change and/or advance in diet**  ** Please call Endocrine for any BG related issues **     Discharge Planning:   TBD. Please notify endocrine prior to discharge.         Orthopedic  Surgical wound present  Optimize BG control             Nia Fernandes PA-C  Endocrinology  [facility] - [facility] Int[facility]

## 2024-03-16 NOTE — PLAN OF CARE
SICU PLAN OF CARE    Dx: Nonrheumatic mitral valve regurgitation    Goals of Care: MAP > 65    Vital Signs (last 12 hours):   Temp:  [97.6 °F (36.4 °C)]   Pulse:  []   Resp:  [5-25]   BP: (111)/(56)   SpO2:  [90 %-98 %]   Arterial Line BP: ()/(38-61)      Neuro: AAOx4, Follows commands , and Moves all extremities spontaneously     Cardiac: NSR    Respiratory:  1L Nasal Cannula , w/ humidification     Gtts: Epinephrine  and Insulin    Urine Output: Urethral Catheter  510  mL/shift        Drains: Pleural Chest Tube, total output 125mL/shift  Mediastinal Chest Tubes, total output 160mL/shift    Diet: Clear Liquid  and 1500mL Fluid Restriction       Labs/Accuchecks: Q1 Accuchecks, Daily AM labs    Skin:  No new skin breakdown noted this shift.  Patient turned independently, bony prominences protected, and mattress inflated/working correctly. Heel foams, sacral foam & SCDs in place. Midsternal island boarder & chest tube dressings dry & intact. V-wire dressing clean & intact, secured to abdomen.    Shift Events:  NAEON. Gtts titrated for BP goals. PRN meds given for pain control. Intermittent nausea, zofran & reglan given with full relief of symptoms. See flowsheet for further assessment/details.  Family updated on current condition/plan of care, questions answered, and emotional support provided.  MD updated on current condition, vitals, labs, and gtts.

## 2024-03-16 NOTE — PLAN OF CARE
Problem: Physical Therapy  Goal: Physical Therapy Goal  Description: Goals to met by 3/30/2024    1. Sit to stand transfer with Stand-by Assistance  2. Bed to chair transfer with Stand-by Assistance using No Assistive Device  3. Gait  x 100 feet with Stand-by Assistance using No Assistive Device   4. Ascend/Descend 6 inch curb step with Contact Guard Assistance using No Assistive Device.  5. Stand for 5 minutes with Supervision using No Assistive Device  6. Lower extremity exercise program x15 reps per Instruction, with assistance as needed in order to facilitate improvement in functional independence    PT Eval: 3/16/2024

## 2024-03-16 NOTE — PLAN OF CARE
Problem: Occupational Therapy  Goal: Occupational Therapy Goal  Description: Goals to be met by: 03-30-24     Patient will increase functional independence with ADLs by performing:    UE Dressing with [place].  LE Dressing with Supervision.  Grooming while standing at sink with Supervision.  Toileting from toilet with Supervision for hygiene and clothing management.   Supine to sit with Supervision.  Stand pivot transfers with Supervision.  Toilet transfer to toilet with Supervision.  Pt. To recall sternal precautions with 100% accuracy    Outcome: Ongoing, Progressing

## 2024-03-16 NOTE — PT/OT/SLP EVAL
Physical Therapy Co-Evaluation and Co-Treatment  Co-evaluation with OT for maximal pt participation, safety, and activity tolerance    Patient Name:  Mora Adrian   MRN:  [id number]  Admit Date: 3/15/2024  Admitting Diagnosis:  Nonrheumatic mitral valve regurgitation   Length of Stay: 1 days  Recent Surgery: Procedure(s) (LRB):  REPAIR, MITRAL VALVE, OPEN (N/A)  EXCLUSION, LEFT ATRIAL APPENDAGE, OPEN, AS PART OF OPEN CHEST SURGERY (N/A) 1 Day Post-Op    Recommendations:     Discharge Recommendations:  Low Intensity Therapy  Discharge Equipment Recommendations: none   Justification for Equipment: N/A  Barriers to discharge: Evolving Clinical Presentation    Assessment:     Mora Adrian is a 68 y.o. female admitted with a medical diagnosis of Nonrheumatic mitral valve regurgitation.  She presents with the following impairments/functional limitations: gait instability, impaired endurance, impaired balance, impaired self care skills, decreased lower extremity function, orthopedic precautions, pain, decreased safety awareness.     Pt agreeable to therapy after receiving pain medication, some difficulty standing from bedside chair while maintaining precautions but once upright moves well. Limited today by nausea once in standing.    Rehab Prognosis: Good; patient would benefit from acute skilled PT services to address these deficits and reach maximum level of function.      Treatment Tolerated: Fairly Well    Highest level of mobility achieved this visit: ambulates 20ft w/ no A/D and min A    Activity with RN/PCT: transfer with one person assist    Plan:     During this hospitalization, patient to be seen 5 x/week to address the identified rehab impairments via gait training, therapeutic exercises, therapeutic activities, neuromuscular re-education and progress towards the established goals.    Plan of Care Expires:  04/16/24    Subjective     RN notified prior to session. Pt's daughter present upon PT entrance  "into room.    Chief Complaint: nausea in standing  Patient/Family Comments/goals: "I was very active before this, I like to work in my garden"  Pain/Comfort:  Pain Rating 1: 7/10  Location - Orientation 1: generalized  Location 1:  (chest tube insertion)  Pain Addressed 1: Reposition, Distraction, Pre-medicate for activity, Nurse notified    Social History:  Residence: lives alone 1-story house with threshold DMITRIY.  Support available: daughter(s) lives next door  Equipment Used: none  Equipment Owned (not using): Cane, Type: Single Point Cane, Walkers, Type: Rolling Walker, and Hygiene Aides, Type: bedside commode and shower chair  Prior level of function: independent  Work: Retired.   Drive: yes.       Objective:     Additional staff present: OT Marina    Patient found up in chair with: arterial line, chest tube, pulse ox (continuous), telemetry, central line, bennett catheter     General Precautions: Standard, fall, sternal   Orthopedic Precautions:N/A   Braces: N/A   Body mass index is 35.76 kg/m².  Oxygen Device: Nasal Cannula 1L    Vitals: BP (!) 100/44   Pulse 86   Temp (!) 48.7 °F (9.3 °C)   Resp (!) 26   Ht 5' 6" (1.676 m)   Wt 100.5 kg (221 lb 9 oz)   SpO2 100%   Breastfeeding No   BMI 35.76 kg/m²     Exams:  Cognition:   Alert and Cooperative  Command following: Follows two-step verbal commands  Fluency: clear/fluent  Hearing: Intact  Vision:  Intact  Skin Integrity: Visible skin intact    Physical Exam:   Edema - None noted  ROM - SOLE LEs WFL  Strength - SOLE LEs WFL   Sensation - Intact to light touch  Coordination - No deficits noted    Outcome Measures:    AM-PAC 6 CLICK MOBILITY  Turning over in bed (including adjusting bedclothes, sheets and blankets)?: 3  Sitting down on and standing up from a chair with arms (e.g., wheelchair, bedside commode, etc.): 2  Moving from lying on back to sitting on the side of the bed?: 3  Moving to and from a bed to a chair (including a wheelchair)?: 3  Need to " walk in hospital room?: 3  Climbing 3-5 steps with a railing?: 2  Basic Mobility Total Score: 16     Functional Mobility:    Bed Mobility:   Pt found/returned to bedside chair    Transfers:   Sit <> Stand Transfer: moderate assistance with no assistive device  Stand <> Sit Transfer: minimum assistance with no assistive device  x1 trials from bedside chair    Standing Balance:  Assistance Level Required: Contact Guard Assistance  Patient used: no assistive device  Time: 8 min  Postural deviations noted: no deviations noted      Gait:   Patient ambulated: 20ft   Patient required: minimal assist  Patient used:  no assistive device  Gait Pattern observed: swing-to gait  Gait Deviation(s): decreased step length and decreased laine  Impairments due to: impaired balance and decreased endurance  all lines remained intact throughout ambulation trial    Education:  Time provided for education, counseling and discussion of health disposition in regards to patient's current status  All questions answered within PT scope of practice and to patient's satisfaction  PT role in POC to address current functional deficits  Pt educated on proper body mechanics, safety techniques, and energy conservation with PT facilitation and cueing throughout session    Patient left up in chair with all lines intact, call button in reach, RN notified, and family present.    GOALS:   Multidisciplinary Problems       Physical Therapy Goals          Problem: Physical Therapy    Goal Priority Disciplines Outcome Goal Variances Interventions   Physical Therapy Goal     PT, PT/OT Ongoing, Progressing     Description: Goals to met by 3/30/2024    1. Sit to stand transfer with Stand-by Assistance  2. Bed to chair transfer with Stand-by Assistance using No Assistive Device  3. Gait  x 100 feet with Stand-by Assistance using No Assistive Device   4. Ascend/Descend 6 inch curb step with Contact Guard Assistance using No Assistive Device.  5. Stand for 5  minutes with Supervision using No Assistive Device  6. Lower extremity exercise program x15 reps per Instruction, with assistance as needed in order to facilitate improvement in functional independence                       History:     Past Medical History:   Diagnosis Date    Anxiety     Arthritis     GERD (gastroesophageal reflux disease)     Hyperlipidemia     Hypertension     Insomnia     Mitral valve insufficiency, unspecified etiology 2024    Thyroid disease     hypo       Past Surgical History:   Procedure Laterality Date    APPENDECTOMY      BREAST CYST ASPIRATION       SECTION, CLASSIC      EXCLUSION, LEFT ATRIAL APPENDAGE, OPEN, AS PART OF OPEN CHEST SURGERY N/A 3/15/2024    Procedure: EXCLUSION, LEFT ATRIAL APPENDAGE, OPEN, AS PART OF OPEN CHEST SURGERY;  Surgeon: Pedrito Bernal MD;  Location: [address];  Service: Cardiothoracic;  Laterality: N/A;    EYE SURGERY Bilateral     cataracts w/iol    GALLBLADDER SURGERY      GASTRIC BYPASS      HYSTERECTOMY      REPAIR, MITRAL VALVE, OPEN N/A 3/15/2024    Procedure: REPAIR, MITRAL VALVE, OPEN;  Surgeon: Pedrito Bernal MD;  Location: [address];  Service: Cardiothoracic;  Laterality: N/A;    TOTAL KNEE ARTHROPLASTY Right     TRANSESOPHAGEAL ECHOCARDIOGRAPHY N/A 2024    Procedure: ECHOCARDIOGRAM, TRANSESOPHAGEAL;  Surgeon: Bobby Card III, MD;  Location: [facility];  Service: Cardiology;  Laterality: N/A;       Time Tracking:     PT Received On: 24  PT Start Time: 0840     PT Stop Time: 0910  PT Total Time (min): 30 min     Billable Minutes: Evaluation 1 procedure and Gait Training 10 min    Dontrell Armas, PT, DPT  3/16/2024

## 2024-03-16 NOTE — SUBJECTIVE & OBJECTIVE
"Interval HPI:   No acute events overnight. Patient in room 82836 CVICU/78125 CVICU A. Blood glucose stable. BG at goal on current insulin regimen (IIP). Steroid use- None.   1 Day Post-Op  Renal function- Normal   Vasopressors-  Epinephrine     Diet Cardiac Fluid - 1500mL; Standard Tray     Eating:   <25%  Nausea: No  Hypoglycemia and intervention: No  Fever: No  TPN and/or TF: No      BP (!) 158/89   Pulse 110   Temp 98.1 °F (36.7 °C)   Resp (!) 29   Ht 5' 6" (1.676 m)   Wt 100.5 kg (221 lb 9 oz)   SpO2 99%   Breastfeeding No   BMI 35.76 kg/m²     Labs Reviewed and Include    Recent Labs   Lab 03/16/24  0340   *   CALCIUM 8.8   ALBUMIN 3.4*   PROT 5.6*   *   K 4.4   CO2 17*      BUN 20   CREATININE 1.2   ALKPHOS 54*   ALT 14   AST 49*   BILITOT 0.3     Lab Results   Component Value Date    WBC 10.02 03/16/2024    HGB 9.7 (L) 03/16/2024    HCT 30.2 (L) 03/16/2024     (H) 03/16/2024     03/16/2024     No results for input(s): "TSH", "FREET4" in the last 168 hours.  Lab Results   Component Value Date    HGBA1C 5.0 03/14/2024       Nutritional status:   Body mass index is 35.76 kg/m².  Lab Results   Component Value Date    ALBUMIN 3.4 (L) 03/16/2024    ALBUMIN 3.0 (L) 03/15/2024    ALBUMIN 3.0 (L) 03/15/2024     No results found for: "PREALBUMIN"    Estimated Creatinine Clearance: 53.7 mL/min (based on SCr of 1.2 mg/dL).    Accu-Checks  Recent Labs     03/16/24  0016 03/16/24  0059 03/16/24  0213 03/16/24  0333 03/16/24  0440 03/16/24  0444 03/16/24  0536 03/16/24  0642 03/16/24  0724 03/16/24  0846   POCTGLUCOSE 139* 135* 132* 157* 176* 127* 103 103 153* 154*       Current Medications and/or Treatments Impacting Glycemic Control  Immunotherapy:    Immunosuppressants       None          Steroids:   Hormones (From admission, onward)      None          Pressors:    Autonomic Drugs (From admission, onward)      Start     Stop Route Frequency Ordered    03/15/24 1330  EPINEPHrine 5 " mg in dextrose 5% 250 mL infusion (premix)        Question Answer Comment   Begin at (in mcg/kg/min): 0.02    Titrate by: (in mcg/kg/min) 0.02    Titrate interval: (in minutes) 5    Titrate to maintain: (SBP or MAP or Cardiac Index) MAP    Greater than: (in mmHg) 65    Cardiac index greater than: (in L/min) 2.2    Maximum dose: (in mcg/kg/min) 2        -- IV Continuous 03/15/24 1319          Hyperglycemia/Diabetes Medications:   Antihyperglycemics (From admission, onward)      Start     Stop Route Frequency Ordered    03/16/24 1039  insulin aspart U-100 pen 0-10 Units         -- SubQ Before meals & nightly PRN 03/16/24 6602

## 2024-03-16 NOTE — SUBJECTIVE & OBJECTIVE
Interval History/Significant Events: Hypotensive when falls asleep and when gets IV dilaudid. MM regimen added. 500 albumin for decreased UOP ovn.    Follow-up For: Procedure(s) (LRB):  REPAIR, MITRAL VALVE, OPEN (N/A)  EXCLUSION, LEFT ATRIAL APPENDAGE, OPEN, AS PART OF OPEN CHEST SURGERY (N/A)    Post-Operative Day: 1 Day Post-Op    Objective:     Vital Signs (Most Recent):  Temp: 98.1 °F (36.7 °C) (03/16/24 0715)  Pulse: 102 (03/16/24 0842)  Resp: (!) 22 (03/16/24 0842)  BP: (!) 158/89 (03/16/24 0842)  SpO2: 99 % (03/16/24 0830) Vital Signs (24h Range):  Temp:  [97.6 °F (36.4 °C)-98.2 °F (36.8 °C)] 98.1 °F (36.7 °C)  Pulse:  [] 102  Resp:  [5-39] 22  SpO2:  [90 %-100 %] 99 %  BP: ()/(43-89) 158/89  Arterial Line BP: ()/(38-63) 115/51     Weight: 100.5 kg (221 lb 9 oz)  Body mass index is 35.76 kg/m².      Intake/Output Summary (Last 24 hours) at 3/16/2024 0848  Last data filed at 3/16/2024 0701  Gross per 24 hour   Intake 3978.6 ml   Output 2005 ml   Net 1973.6 ml          Physical Exam  Vitals and nursing note reviewed.   Constitutional:       Appearance: Normal appearance.      Interventions: Nasal cannula in place.   HENT:      Head: Normocephalic and atraumatic.   Eyes:      Conjunctiva/sclera: Conjunctivae normal.   Neck:      Trachea: Trachea normal.   Cardiovascular:      Rate and Rhythm: Normal rate and regular rhythm.      Pulses: Normal pulses.      Heart sounds: Normal heart sounds.   Pulmonary:      Effort: Pulmonary effort is normal. No respiratory distress or retractions.      Breath sounds: Normal breath sounds.   Chest:      Comments: Chest incision CDI  2 Meds, 1 R pleural chest tubes to suction  Abdominal:      General: Abdomen is flat. Bowel sounds are normal.      Palpations: Abdomen is soft.   Genitourinary:     Comments: Lance  Musculoskeletal:         General: No swelling.      Cervical back: Normal range of motion.   Skin:     General: Skin is warm and dry.      Capillary  Refill: Capillary refill takes less than 2 seconds.   Neurological:      Mental Status: She is alert. Mental status is at baseline.   Psychiatric:         Mood and Affect: Mood normal.         Behavior: Behavior normal. Behavior is cooperative.            Vents:  Lines/Drains/Airways       Central Venous Catheter Line  Duration              Introducer with Double Lumen 03/15/24 0800 Internal Jugular Right 1 day    Percutaneous Central Line - Triple Lumen  03/15/24 0800 Internal Jugular Right 1 day              Drain  Duration                  Urethral Catheter 03/15/24 0811 Temperature probe 16 Fr. 1 day         Chest Tube 03/15/24 1303 Tube - 3 Right Pleural 19 Fr. <1 day         Y Chest Tube 1 and 2 03/15/24 1303 1 Anterior Mediastinal 24 Fr. 2 Mediastinal 19 Fr. <1 day              Arterial Line  Duration             Arterial Line 03/15/24 0740 Left Radial 1 day              Line  Duration                  Pacer Wires 03/15/24 1151 <1 day              Peripheral Intravenous Line  Duration                  Peripheral IV - Single Lumen 03/15/24 0603 20 G Left;Posterior Hand 1 day         Peripheral IV - Single Lumen 03/15/24 0805 16 G Right;Posterior Forearm 1 day                    Significant Labs:    CBC/Anemia Profile:  Recent Labs   Lab 03/14/24  1354 03/15/24  1021 03/15/24  1354 03/15/24  1437 03/15/24  1606 03/16/24  0340   WBC 5.37  --  15.26*  15.26*  --   --  10.02   HGB 11.7*  --  11.6*  11.6*  --   --  9.7*   HCT 36.0*   < > 35.2*  35.2* 35* 36 30.2*     --  178  178  --   --  155   *  --  101*  101*  --   --  103*   RDW 13.3  --  13.4  13.4  --   --  13.9    < > = values in this interval not displayed.        Chemistries:  Recent Labs   Lab 03/14/24  1354 03/15/24  1354 03/16/24  0340   * 136  136 133*   K 4.1 4.1  4.1 4.4    109  109 108   CO2 23 14*  14* 17*   BUN 24* 17  17 20   CREATININE 1.1 0.8  0.8 1.2   CALCIUM 9.4 9.5  9.5 8.8   ALBUMIN 3.7 3.0*  3.0*  3.4*   PROT 6.7 5.5*  5.5* 5.6*   BILITOT 0.4 0.5  0.5 0.3   ALKPHOS 95 65  65 54*   ALT 25 22  22 14   AST 35 54*  54* 49*   MG  --  2.4  2.4 1.9   PHOS  --  3.1  3.1 4.6*       All pertinent labs within the past 24 hours have been reviewed.    Significant Imaging:  I have reviewed all pertinent imaging results/findings within the past 24 hours.

## 2024-03-16 NOTE — CONSULTS
[facility] - [facility] Intensive Care  Adult Nutrition  Consult Note    SUMMARY     Recommendations    1. Continue CLD 1/ fluid per MD, ADAT to Cardiac diet  2. Once diet advanced, If intake <50% add Boost BID for adequate calories/protein   3. If alternative means of nutrition warranted, consult RD  4. RD Following    Goals: Meet % een/epn by next RD f/u  Nutrition Goal Status: new  Communication of RD Recs: other (comment) (POC)    Assessment and Plan    Nutrition Problem  Increased PRO/Kcal needs     Related to (etiology):   Increased physiological needs     Signs and Symptoms (as evidenced by):   S/p cardiac surgery      Interventions/Recommendations (treatment strategy):  Collaboration of nutritional care with other providers.  Low Na and Heart Healthy diet edu provided on 3/16.  ONS     Nutrition Diagnosis Status:   New     Reason for Assessment    Reason For Assessment: consult  Diagnosis: cardiac disease  Relevant Medical History: HTN, Heart failure with reduced ejection fraction, GERD, HLD, Hypothyroidism  Interdisciplinary Rounds: did not attend  General Information Comments: RD consulted for assessment/ cardiac education. Pt 1 day post op MV repair. Extubated yesterday. Pleural Chest Tube, and Mediastina Chest tube present. Diet adv at lunch to Cardiac. Pt see at bedside with caregiver at bedside (likely daughter). Endorses poor appetite so far and nausea. No BM since surgery. Unable to eat chicken from lunch tray. Caregiver purchases squash and fish from cafeteria (throat is dry), pt ate some of that and carrots from tray. -220# endorses eating more than normal prior to surgery and wt gain. NFPE not warranted at this time. Low sodium, heart healthy, and heart healthy food label diet educations provided with handouts and RD contact information. Pt states outpatient MD said she wouldn't have to follow LS diet. RD advised Cardiac diet per inpatient MD consult and to follow up w/ primary MD.  "Following.  Nutrition Discharge Planning: Cardiac diet EDU provided by RD 3/16/24    Nutrition Risk Screen    Nutrition Risk Screen: no indicators present    Nutrition/Diet History    Spiritual, Cultural Beliefs, [employer]ism Practices, Values that Affect Care: no  Food Allergies: NKFA    Anthropometrics    Temp: 98.2 °F (36.8 °C)  Height Method: Stated  Height: 5' 6" (167.6 cm)  Height (inches): 66 in  Weight Method: Stated  Weight: 100.5 kg (221 lb 9 oz)  Weight (lb): 221.56 lb  Ideal Body Weight (IBW), Female: 130 lb  % Ideal Body Weight, Female (lb): 170.43 %  BMI (Calculated): 35.8  BMI Grade: 35 - 39.9 - obesity - grade II       Lab/Procedures/Meds    Pertinent Labs Reviewed: reviewed  Pertinent Labs Comments: N/A  Pertinent Medications Reviewed: reviewed  Pertinent Medications Comments: Docusate sodium, lopressor, pantoprazole, polyethylene glycol, trazodone, insulin, epinephrine      Estimated/Assessed Needs    Weight Used For Calorie Calculations: 100.5 kg (221 lb 9 oz)  Energy Calorie Requirements (kcal): 1551 (msj)  Energy Need Method: [place]-St Jeor  Protein Requirements: 120-140 (1.2-1.4 g/kg)  Weight Used For Protein Calculations: 100.5 kg (221 lb 9 oz)     Estimated Fluid Requirement Method: other (see comments) (per MD)  RDA Method (mL): 1551         Nutrition Prescription Ordered    Current Diet Order: Clear Liquid Diet  Nutrition Order Comments: 1500 ml fl    Evaluation of Received Nutrient/Fluid Intake    I/O: +1.9 L since admit  Comments: LBM 3/14  Tolerance: tolerating  % Intake of Estimated Energy Needs: 25 - 50 %  % Meal Intake: 25 - 50 %    Nutrition Risk    Level of Risk/Frequency of Follow-up: low (f/u 1x/week)       Monitor and Evaluation    Food and Nutrient Intake: energy intake, food and beverage intake  Food and Nutrient Adminstration: diet order  Knowledge/Beliefs/Attitudes: food and nutrition knowledge/skill, beliefs and attitudes  Physical Activity and Function: nutrition-related ADLs " and IADLs  Anthropometric Measurements: height/length, weight, body mass index, weight change  Biochemical Data, Medical Tests and Procedures: electrolyte and renal panel, gastrointestinal profile, glucose/endocrine profile, lipid profile, inflammatory profile  Nutrition-Focused Physical Findings: overall appearance       Nutrition Follow-Up    RD Follow-up?: Yes    Rosa Maria Lei RD, LDN

## 2024-03-16 NOTE — ASSESSMENT & PLAN NOTE
BG goal: 110-140    - d/c IIP as BG stable at/below goal   - Start MDC (150/25) prn ac/hs   - POCT glucose ac/hs   - Hypoglycemia protocol in place      ** Please notify Endocrine for any change and/or advance in diet**  ** Please call Endocrine for any BG related issues **     Discharge Planning:   TBD. Please notify endocrine prior to discharge.

## 2024-03-16 NOTE — PROGRESS NOTES
[facility] - [facility] Intensive Care  Critical Care - Surgery  Progress Note    Patient Name: Mora Adrian  MRN: [id number]  Admission Date: 3/15/2024  Hospital Length of Stay: 1 days  Code Status: Full Code  Attending Provider: Pedrito Bernal MD  Primary Care Provider: Marvin Bone MD   Principal Problem: Nonrheumatic mitral valve regurgitation    Subjective:     Hospital/ICU Course:  No notes on file    Interval History/Significant Events: Hypotensive when falls asleep and when gets IV dilaudid. MM regimen added. 500 albumin for decreased UOP ovn.    Follow-up For: Procedure(s) (LRB):  REPAIR, MITRAL VALVE, OPEN (N/A)  EXCLUSION, LEFT ATRIAL APPENDAGE, OPEN, AS PART OF OPEN CHEST SURGERY (N/A)    Post-Operative Day: 1 Day Post-Op    Objective:     Vital Signs (Most Recent):  Temp: 98.1 °F (36.7 °C) (03/16/24 0715)  Pulse: 102 (03/16/24 0842)  Resp: (!) 22 (03/16/24 0842)  BP: (!) 158/89 (03/16/24 0842)  SpO2: 99 % (03/16/24 0830) Vital Signs (24h Range):  Temp:  [97.6 °F (36.4 °C)-98.2 °F (36.8 °C)] 98.1 °F (36.7 °C)  Pulse:  [] 102  Resp:  [5-39] 22  SpO2:  [90 %-100 %] 99 %  BP: ()/(43-89) 158/89  Arterial Line BP: ()/(38-63) 115/51     Weight: 100.5 kg (221 lb 9 oz)  Body mass index is 35.76 kg/m².      Intake/Output Summary (Last 24 hours) at 3/16/2024 0848  Last data filed at 3/16/2024 0701  Gross per 24 hour   Intake 3978.6 ml   Output 2005 ml   Net 1973.6 ml          Physical Exam  Vitals and nursing note reviewed.   Constitutional:       Appearance: Normal appearance.      Interventions: Nasal cannula in place.   HENT:      Head: Normocephalic and atraumatic.   Eyes:      Conjunctiva/sclera: Conjunctivae normal.   Neck:      Trachea: Trachea normal.   Cardiovascular:      Rate and Rhythm: Normal rate and regular rhythm.      Pulses: Normal pulses.      Heart sounds: Normal heart sounds.   Pulmonary:      Effort: Pulmonary effort is normal. No respiratory distress or  retractions.      Breath sounds: Normal breath sounds.   Chest:      Comments: Chest incision CDI  2 Meds, 1 R pleural chest tubes to suction  Abdominal:      General: Abdomen is flat. Bowel sounds are normal.      Palpations: Abdomen is soft.   Genitourinary:     Comments: Lance  Musculoskeletal:         General: No swelling.      Cervical back: Normal range of motion.   Skin:     General: Skin is warm and dry.      Capillary Refill: Capillary refill takes less than 2 seconds.   Neurological:      Mental Status: She is alert. Mental status is at baseline.   Psychiatric:         Mood and Affect: Mood normal.         Behavior: Behavior normal. Behavior is cooperative.            Vents:  Lines/Drains/Airways       Central Venous Catheter Line  Duration              Introducer with Double Lumen 03/15/24 0800 Internal Jugular Right 1 day    Percutaneous Central Line - Triple Lumen  03/15/24 0800 Internal Jugular Right 1 day              Drain  Duration                  Urethral Catheter 03/15/24 0811 Temperature probe 16 Fr. 1 day         Chest Tube 03/15/24 1303 Tube - 3 Right Pleural 19 Fr. <1 day         Y Chest Tube 1 and 2 03/15/24 1303 1 Anterior Mediastinal 24 Fr. 2 Mediastinal 19 Fr. <1 day              Arterial Line  Duration             Arterial Line 03/15/24 0740 Left Radial 1 day              Line  Duration                  Pacer Wires 03/15/24 1151 <1 day              Peripheral Intravenous Line  Duration                  Peripheral IV - Single Lumen 03/15/24 0603 20 G Left;Posterior Hand 1 day         Peripheral IV - Single Lumen 03/15/24 0805 16 G Right;Posterior Forearm 1 day                    Significant Labs:    CBC/Anemia Profile:  Recent Labs   Lab 03/14/24  1354 03/15/24  1021 03/15/24  1354 03/15/24  1437 03/15/24  1606 03/16/24  0340   WBC 5.37  --  15.26*  15.26*  --   --  10.02   HGB 11.7*  --  11.6*  11.6*  --   --  9.7*   HCT 36.0*   < > 35.2*  35.2* 35* 36 30.2*     --  178  178   --   --  155   *  --  101*  101*  --   --  103*   RDW 13.3  --  13.4  13.4  --   --  13.9    < > = values in this interval not displayed.        Chemistries:  Recent Labs   Lab 03/14/24  1354 03/15/24  1354 03/16/24  0340   * 136  136 133*   K 4.1 4.1  4.1 4.4    109  109 108   CO2 23 14*  14* 17*   BUN 24* 17  17 20   CREATININE 1.1 0.8  0.8 1.2   CALCIUM 9.4 9.5  9.5 8.8   ALBUMIN 3.7 3.0*  3.0* 3.4*   PROT 6.7 5.5*  5.5* 5.6*   BILITOT 0.4 0.5  0.5 0.3   ALKPHOS 95 65  65 54*   ALT 25 22  22 14   AST 35 54*  54* 49*   MG  --  2.4  2.4 1.9   PHOS  --  3.1  3.1 4.6*       All pertinent labs within the past 24 hours have been reviewed.    Significant Imaging:  I have reviewed all pertinent imaging results/findings within the past 24 hours.  Assessment/Plan:     Cardiac/Vascular  * Nonrheumatic mitral valve regurgitation  Mora Adrian is a 68 y.o. female with a past medical history significant for anxiety, arthritis, GERD, hypertension, hyperlipidemia, hypothyroidism and HFrEF. She reports she was preparing for a knee operation and attempted to get cardiac clearance. She underwent an evaluation which demonstrated severe mitral regurgitation, severely dilated left and right atrium, and an ejection fraction of 35-40%. She denied any symptoms including dyspnea on exertion, chest pain, palpations, or lower extremity edema.        Neuro/Psych:     - Sedation:  n/a    - Pain:    - Scheduled Tylenol 1g q8h   - robaxin, Oxy PRN             Cardiac:     - S/P Mitral Valve repair and Left Atrial Appendage Ligation with Dr. Bernal on 3/15/24    - BP Goal: MAP 60-80     - Cleviprex PRN    - Pressors: Epi 0.04    - Anti-HTNs: Will start when appropriate    - Rhythm: NSR    - Beta blocker: Will start when appropriate    - Statin: Atorvastatin 40      Pulmonary:     - Goal SpO2 >92%    - Will wean ventilator support as tolerated to extubate    - Chest Tubes x 3 (2 Meds & 1 R Pleural)    -  ABGs PRN      Renal:    - Trend BUN/Cr     - Maintain Lance, record strict Is/Os    Recent Labs   Lab 03/14/24  1354 03/15/24  1354 03/16/24  0340   BUN 24* 17  17 20   CREATININE 1.1 0.8  0.8 1.2           FEN / GI:     - Daily CMP, PRN K/Mag/Phos per protocol     - Replace electrolytes as needed    - Nutrition: ADAT today    - Bowel Regimen: Miralax, docusate      ID:     - Afebrile    - WBC stable    - Abx: Complete perioperative cefazolin 2g Q8H x 5 doses    Recent Labs   Lab 03/14/24  1354 03/15/24  1354 03/16/24  0340   WBC 5.37 15.26*  15.26* 10.02           Heme/Onc:     - Hgb 11.7 pre-operatively    - CBC daily    - ASA 325mg daily    Recent Labs   Lab 03/14/24  1354 03/15/24  1354 03/16/24  0340   HGB 11.7* 11.6*  11.6* 9.7*    178  178 155   APTT 27.2 28.5  28.5 <21.0   INR 1.0 1.1  1.1 1.0           Endocrine:     - CTS Goal -140    - HgbA1c:     - Endocrinology consulted for insulin management      PPx:   Feeding: Tolerating CLAD, ADAT today  Analgesia/Sedation: Multimodal  Thromboembolic Prevention: SCD's and PT/OT  HOB >30: Yes  Stress Ulcer: Home Protonix  Glucose Control: Yes, insulin management per Endocrinology     Lines/Drains/Airway:   ETT   Left radial arterial line   RIJ CVC   Lance   Chest Tubes: x2 (2 Mediastinal, 1 R Pleural)    Pacing Wires: Temporary ventricular pacing wires      Dispo/Code Status/Palliative:     - SICU    - Full Code         Critical care was time spent personally by me on the following activities: development of treatment plan with patient or surrogate and bedside caregivers, discussions with consultants, evaluation of patient's response to treatment, examination of patient, ordering and performing treatments and interventions, ordering and review of laboratory studies, ordering and review of radiographic studies, pulse oximetry, re-evaluation of patient's condition.  This critical care time did not overlap with that of any other provider or involve  time for any procedures.     Tino Nugent,   Critical Care - Surgery  [facility] - [facility] Int[facility]

## 2024-03-16 NOTE — PLAN OF CARE
Recommendations    1. Continue CLD 1/ fluid per MD, ADAT to Cardiac diet  2. Once diet advanced, If intake <50% add Boost BID for adequate calories/protein   3. If alternative means of nutrition warranted, consult RD  4. RD Following    Goals: Meet % een/epn by next RD f/u  Nutrition Goal Status: new  Communication of RD Recs: other (comment) (POC)

## 2024-03-17 LAB
ALBUMIN SERPL BCP-MCNC: 3 G/DL (ref 3.5–5.2)
ALP SERPL-CCNC: 55 U/L (ref 55–135)
ALT SERPL W/O P-5'-P-CCNC: 6 U/L (ref 10–44)
ANION GAP SERPL CALC-SCNC: 6 MMOL/L (ref 8–16)
ANION GAP SERPL CALC-SCNC: 8 MMOL/L (ref 8–16)
ANION GAP SERPL CALC-SCNC: 8 MMOL/L (ref 8–16)
APTT PPP: 25.1 SEC (ref 21–32)
AST SERPL-CCNC: 34 U/L (ref 10–40)
BASOPHILS # BLD AUTO: 0.03 K/UL (ref 0–0.2)
BASOPHILS NFR BLD: 0.4 % (ref 0–1.9)
BILIRUB SERPL-MCNC: 0.2 MG/DL (ref 0.1–1)
BUN SERPL-MCNC: 26 MG/DL (ref 8–23)
BUN SERPL-MCNC: 26 MG/DL (ref 8–23)
BUN SERPL-MCNC: 29 MG/DL (ref 8–23)
CALCIUM SERPL-MCNC: 8.2 MG/DL (ref 8.7–10.5)
CALCIUM SERPL-MCNC: 8.3 MG/DL (ref 8.7–10.5)
CALCIUM SERPL-MCNC: 8.3 MG/DL (ref 8.7–10.5)
CHLORIDE SERPL-SCNC: 103 MMOL/L (ref 95–110)
CHLORIDE SERPL-SCNC: 104 MMOL/L (ref 95–110)
CHLORIDE SERPL-SCNC: 106 MMOL/L (ref 95–110)
CO2 SERPL-SCNC: 18 MMOL/L (ref 23–29)
CO2 SERPL-SCNC: 18 MMOL/L (ref 23–29)
CO2 SERPL-SCNC: 20 MMOL/L (ref 23–29)
CREAT SERPL-MCNC: 1.4 MG/DL (ref 0.5–1.4)
CREAT SERPL-MCNC: 1.5 MG/DL (ref 0.5–1.4)
CREAT SERPL-MCNC: 1.9 MG/DL (ref 0.5–1.4)
DIFFERENTIAL METHOD BLD: ABNORMAL
EOSINOPHIL # BLD AUTO: 0 K/UL (ref 0–0.5)
EOSINOPHIL NFR BLD: 0 % (ref 0–8)
ERYTHROCYTE [DISTWIDTH] IN BLOOD BY AUTOMATED COUNT: 14 % (ref 11.5–14.5)
EST. GFR  (NO RACE VARIABLE): 28.4 ML/MIN/1.73 M^2
EST. GFR  (NO RACE VARIABLE): 37.7 ML/MIN/1.73 M^2
EST. GFR  (NO RACE VARIABLE): 41 ML/MIN/1.73 M^2
GLUCOSE SERPL-MCNC: 108 MG/DL (ref 70–110)
GLUCOSE SERPL-MCNC: 114 MG/DL (ref 70–110)
GLUCOSE SERPL-MCNC: 117 MG/DL (ref 70–110)
HCT VFR BLD AUTO: 27.4 % (ref 37–48.5)
HGB BLD-MCNC: 8.5 G/DL (ref 12–16)
IMM GRANULOCYTES # BLD AUTO: 0.03 K/UL (ref 0–0.04)
IMM GRANULOCYTES NFR BLD AUTO: 0.4 % (ref 0–0.5)
INR PPP: 1 (ref 0.8–1.2)
LYMPHOCYTES # BLD AUTO: 1.1 K/UL (ref 1–4.8)
LYMPHOCYTES NFR BLD: 14.8 % (ref 18–48)
MAGNESIUM SERPL-MCNC: 2.3 MG/DL (ref 1.6–2.6)
MCH RBC QN AUTO: 32.2 PG (ref 27–31)
MCHC RBC AUTO-ENTMCNC: 31 G/DL (ref 32–36)
MCV RBC AUTO: 104 FL (ref 82–98)
MONOCYTES # BLD AUTO: 0.9 K/UL (ref 0.3–1)
MONOCYTES NFR BLD: 11.8 % (ref 4–15)
NEUTROPHILS # BLD AUTO: 5.2 K/UL (ref 1.8–7.7)
NEUTROPHILS NFR BLD: 72.6 % (ref 38–73)
NRBC BLD-RTO: 0 /100 WBC
PHOSPHATE SERPL-MCNC: 4.4 MG/DL (ref 2.7–4.5)
PLATELET # BLD AUTO: 119 K/UL (ref 150–450)
PMV BLD AUTO: 10.3 FL (ref 9.2–12.9)
POCT GLUCOSE: 103 MG/DL (ref 70–110)
POCT GLUCOSE: 125 MG/DL (ref 70–110)
POCT GLUCOSE: 127 MG/DL (ref 70–110)
POCT GLUCOSE: 95 MG/DL (ref 70–110)
POTASSIUM SERPL-SCNC: 3.7 MMOL/L (ref 3.5–5.1)
POTASSIUM SERPL-SCNC: 3.7 MMOL/L (ref 3.5–5.1)
POTASSIUM SERPL-SCNC: 4.2 MMOL/L (ref 3.5–5.1)
PROT SERPL-MCNC: 5.3 G/DL (ref 6–8.4)
PROTHROMBIN TIME: 10.4 SEC (ref 9–12.5)
RBC # BLD AUTO: 2.64 M/UL (ref 4–5.4)
SODIUM SERPL-SCNC: 130 MMOL/L (ref 136–145)
SODIUM SERPL-SCNC: 130 MMOL/L (ref 136–145)
SODIUM SERPL-SCNC: 131 MMOL/L (ref 136–145)
WBC # BLD AUTO: 7.18 K/UL (ref 3.9–12.7)

## 2024-03-17 PROCEDURE — 85025 COMPLETE CBC W/AUTO DIFF WBC: CPT | Performed by: STUDENT IN AN ORGANIZED HEALTH CARE EDUCATION/TRAINING PROGRAM

## 2024-03-17 PROCEDURE — P9045 ALBUMIN (HUMAN), 5%, 250 ML: HCPCS | Mod: JZ,JG

## 2024-03-17 PROCEDURE — 25000003 PHARM REV CODE 250

## 2024-03-17 PROCEDURE — C9248 INJ, CLEVIDIPINE BUTYRATE: HCPCS

## 2024-03-17 PROCEDURE — 83735 ASSAY OF MAGNESIUM: CPT | Performed by: STUDENT IN AN ORGANIZED HEALTH CARE EDUCATION/TRAINING PROGRAM

## 2024-03-17 PROCEDURE — A4216 STERILE WATER/SALINE, 10 ML: HCPCS | Performed by: PHYSICIAN ASSISTANT

## 2024-03-17 PROCEDURE — 85610 PROTHROMBIN TIME: CPT | Performed by: STUDENT IN AN ORGANIZED HEALTH CARE EDUCATION/TRAINING PROGRAM

## 2024-03-17 PROCEDURE — 80048 BASIC METABOLIC PNL TOTAL CA: CPT | Mod: XB

## 2024-03-17 PROCEDURE — 20600001 HC STEP DOWN PRIVATE ROOM

## 2024-03-17 PROCEDURE — 85730 THROMBOPLASTIN TIME PARTIAL: CPT | Performed by: STUDENT IN AN ORGANIZED HEALTH CARE EDUCATION/TRAINING PROGRAM

## 2024-03-17 PROCEDURE — 63600175 PHARM REV CODE 636 W HCPCS: Performed by: PHYSICIAN ASSISTANT

## 2024-03-17 PROCEDURE — 25000003 PHARM REV CODE 250: Performed by: PHYSICIAN ASSISTANT

## 2024-03-17 PROCEDURE — 63600175 PHARM REV CODE 636 W HCPCS: Mod: JZ,JG

## 2024-03-17 PROCEDURE — 63600175 PHARM REV CODE 636 W HCPCS

## 2024-03-17 PROCEDURE — 25000003 PHARM REV CODE 250: Performed by: ANESTHESIOLOGY

## 2024-03-17 PROCEDURE — 99291 CRITICAL CARE FIRST HOUR: CPT | Mod: ,,, | Performed by: ANESTHESIOLOGY

## 2024-03-17 PROCEDURE — 84100 ASSAY OF PHOSPHORUS: CPT | Performed by: STUDENT IN AN ORGANIZED HEALTH CARE EDUCATION/TRAINING PROGRAM

## 2024-03-17 PROCEDURE — 27000221 HC OXYGEN, UP TO 24 HOURS

## 2024-03-17 PROCEDURE — 94761 N-INVAS EAR/PLS OXIMETRY MLT: CPT

## 2024-03-17 PROCEDURE — 99900035 HC TECH TIME PER 15 MIN (STAT)

## 2024-03-17 PROCEDURE — 25000003 PHARM REV CODE 250: Performed by: STUDENT IN AN ORGANIZED HEALTH CARE EDUCATION/TRAINING PROGRAM

## 2024-03-17 PROCEDURE — 94799 UNLISTED PULMONARY SVC/PX: CPT | Mod: XB

## 2024-03-17 PROCEDURE — 80053 COMPREHEN METABOLIC PANEL: CPT | Performed by: STUDENT IN AN ORGANIZED HEALTH CARE EDUCATION/TRAINING PROGRAM

## 2024-03-17 PROCEDURE — 93010 ELECTROCARDIOGRAM REPORT: CPT | Mod: ,,, | Performed by: INTERNAL MEDICINE

## 2024-03-17 PROCEDURE — 93005 ELECTROCARDIOGRAM TRACING: CPT

## 2024-03-17 RX ORDER — PANTOPRAZOLE SODIUM 40 MG/1
40 TABLET, DELAYED RELEASE ORAL DAILY
Status: DISCONTINUED | OUTPATIENT
Start: 2024-03-17 | End: 2024-03-25 | Stop reason: HOSPADM

## 2024-03-17 RX ORDER — ALBUMIN HUMAN 50 G/1000ML
25 SOLUTION INTRAVENOUS ONCE
Status: COMPLETED | OUTPATIENT
Start: 2024-03-17 | End: 2024-03-18

## 2024-03-17 RX ORDER — POTASSIUM CHLORIDE 20 MEQ/1
40 TABLET, EXTENDED RELEASE ORAL EVERY 4 HOURS
Status: COMPLETED | OUTPATIENT
Start: 2024-03-17 | End: 2024-03-17

## 2024-03-17 RX ORDER — FUROSEMIDE 10 MG/ML
40 INJECTION INTRAMUSCULAR; INTRAVENOUS 2 TIMES DAILY
Status: DISCONTINUED | OUTPATIENT
Start: 2024-03-18 | End: 2024-03-18

## 2024-03-17 RX ORDER — ALBUMIN HUMAN 50 G/1000ML
25 SOLUTION INTRAVENOUS ONCE
Status: DISCONTINUED | OUTPATIENT
Start: 2024-03-18 | End: 2024-03-17

## 2024-03-17 RX ORDER — FUROSEMIDE 10 MG/ML
40 INJECTION INTRAMUSCULAR; INTRAVENOUS 3 TIMES DAILY
Status: DISCONTINUED | OUTPATIENT
Start: 2024-03-17 | End: 2024-03-17

## 2024-03-17 RX ORDER — POTASSIUM CHLORIDE 20 MEQ/1
40 TABLET, EXTENDED RELEASE ORAL ONCE
Status: COMPLETED | OUTPATIENT
Start: 2024-03-17 | End: 2024-03-17

## 2024-03-17 RX ORDER — NIFEDIPINE 30 MG/1
30 TABLET, EXTENDED RELEASE ORAL DAILY
Status: DISCONTINUED | OUTPATIENT
Start: 2024-03-17 | End: 2024-03-18

## 2024-03-17 RX ADMIN — MUPIROCIN 1 G: 20 OINTMENT TOPICAL at 08:03

## 2024-03-17 RX ADMIN — POLYETHYLENE GLYCOL 3350 17 G: 17 POWDER, FOR SOLUTION ORAL at 08:03

## 2024-03-17 RX ADMIN — FUROSEMIDE 40 MG: 10 INJECTION, SOLUTION INTRAVENOUS at 02:03

## 2024-03-17 RX ADMIN — ACETAMINOPHEN 1000 MG: 500 TABLET ORAL at 09:03

## 2024-03-17 RX ADMIN — EZETIMIBE 10 MG: 10 TABLET ORAL at 09:03

## 2024-03-17 RX ADMIN — ASPIRIN 325 MG: 325 TABLET, COATED ORAL at 08:03

## 2024-03-17 RX ADMIN — MUPIROCIN 1 G: 20 OINTMENT TOPICAL at 09:03

## 2024-03-17 RX ADMIN — OXYCODONE HYDROCHLORIDE 10 MG: 10 TABLET ORAL at 01:03

## 2024-03-17 RX ADMIN — ACETAMINOPHEN 1000 MG: 500 TABLET ORAL at 01:03

## 2024-03-17 RX ADMIN — CEFAZOLIN 2 G: 2 INJECTION, POWDER, FOR SOLUTION INTRAMUSCULAR; INTRAVENOUS at 03:03

## 2024-03-17 RX ADMIN — ALBUMIN (HUMAN) 25 G: 12.5 SOLUTION INTRAVENOUS at 11:03

## 2024-03-17 RX ADMIN — OXYCODONE HYDROCHLORIDE 10 MG: 10 TABLET ORAL at 11:03

## 2024-03-17 RX ADMIN — NIFEDIPINE 30 MG: 30 TABLET, FILM COATED, EXTENDED RELEASE ORAL at 10:03

## 2024-03-17 RX ADMIN — LEVOTHYROXINE SODIUM 150 MCG: 150 TABLET ORAL at 05:03

## 2024-03-17 RX ADMIN — TRAZODONE HYDROCHLORIDE 100 MG: 100 TABLET ORAL at 09:03

## 2024-03-17 RX ADMIN — PANTOPRAZOLE SODIUM 40 MG: 40 TABLET, DELAYED RELEASE ORAL at 08:03

## 2024-03-17 RX ADMIN — FUROSEMIDE 40 MG: 10 INJECTION, SOLUTION INTRAVENOUS at 10:03

## 2024-03-17 RX ADMIN — POTASSIUM CHLORIDE 40 MEQ: 1500 TABLET, EXTENDED RELEASE ORAL at 03:03

## 2024-03-17 RX ADMIN — OXYCODONE 5 MG: 5 TABLET ORAL at 07:03

## 2024-03-17 RX ADMIN — HYDROMORPHONE HYDROCHLORIDE 0.2 MG: 0.5 INJECTION, SOLUTION INTRAMUSCULAR; INTRAVENOUS; SUBCUTANEOUS at 05:03

## 2024-03-17 RX ADMIN — Medication 10 ML: at 09:03

## 2024-03-17 RX ADMIN — HYDROMORPHONE HYDROCHLORIDE 0.2 MG: 0.5 INJECTION, SOLUTION INTRAMUSCULAR; INTRAVENOUS; SUBCUTANEOUS at 09:03

## 2024-03-17 RX ADMIN — POTASSIUM CHLORIDE 40 MEQ: 1500 TABLET, EXTENDED RELEASE ORAL at 05:03

## 2024-03-17 RX ADMIN — POTASSIUM CHLORIDE 40 MEQ: 1500 TABLET, EXTENDED RELEASE ORAL at 10:03

## 2024-03-17 RX ADMIN — METOPROLOL TARTRATE 25 MG: 25 TABLET, FILM COATED ORAL at 09:03

## 2024-03-17 RX ADMIN — DOCUSATE SODIUM 100 MG: 100 CAPSULE, LIQUID FILLED ORAL at 09:03

## 2024-03-17 RX ADMIN — DOCUSATE SODIUM 100 MG: 100 CAPSULE, LIQUID FILLED ORAL at 08:03

## 2024-03-17 RX ADMIN — CLEVIPIDINE 2 MG/HR: 0.5 EMULSION INTRAVENOUS at 04:03

## 2024-03-17 RX ADMIN — METOPROLOL TARTRATE 25 MG: 25 TABLET, FILM COATED ORAL at 08:03

## 2024-03-17 RX ADMIN — ACETAMINOPHEN 1000 MG: 500 TABLET ORAL at 05:03

## 2024-03-17 NOTE — ASSESSMENT & PLAN NOTE
Mora Adrian is a 68 y.o. female with a past medical history significant for anxiety, arthritis, GERD, hypertension, hyperlipidemia, hypothyroidism and HFrEF. She reports she was preparing for a knee operation and attempted to get cardiac clearance. She underwent an evaluation which demonstrated severe mitral regurgitation, severely dilated left and right atrium, and an ejection fraction of 35-40%. She denied any symptoms including dyspnea on exertion, chest pain, palpations, or lower extremity edema.        Neuro/Psych:     - Sedation:  n/a    - Pain:    - Scheduled Tylenol 1g q8h   -  Oxy PRN and dilaudid 0.2 IV breakthrough              Cardiac:     - S/P Mitral Valve repair and Left Atrial Appendage Ligation with Dr. Bernal on 3/15/24    - BP Goal: MAP 60-80     - Cleviprex PRN    - Pressors: Epi off    - Anti-HTNs: Will start when appropriate    - Rhythm: NSR    - Beta blocker: Will start when appropriate    - Statin: Atorvastatin 40      Pulmonary:     - Goal SpO2 >92%    - Chest Tubes x 3 (2 Meds & 1 R Pleural)    - ABGs PRN      Renal:    - Trend BUN/Cr     - Maintain Lance, record strict Is/Os  - Lasix 20 mg IV BID    Recent Labs   Lab 03/15/24  1354 03/16/24  0340 03/17/24  0350   BUN 17  17 20 29*   CREATININE 0.8  0.8 1.2 1.9*           FEN / GI:     - Daily CMP, PRN K/Mag/Phos per protocol     - Replace electrolytes as needed    - Nutrition: Cardiac diet    - Bowel Regimen: Miralax, docusate      ID:     - Afebrile    - WBC stable    - Abx: Complete perioperative cefazolin 2g Q8H x 5 doses    Recent Labs   Lab 03/15/24  1354 03/16/24  0340 03/17/24  0350   WBC 15.26*  15.26* 10.02 7.18           Heme/Onc:     - Hgb 11.7 pre-operatively    - CBC daily    - ASA 325mg daily    Recent Labs   Lab 03/15/24  1354 03/16/24  0340 03/17/24  0350   HGB 11.6*  11.6* 9.7* 8.5*     178 155 119*   APTT 28.5  28.5 <21.0 25.1   INR 1.1  1.1 1.0 1.0           Endocrine:     - CTS Goal -140    -  HgbA1c:     - Endocrinology consulted for insulin management      PPx:   Feeding: Tolerating Cardiac diet   Analgesia/Sedation: Multimodal  Thromboembolic Prevention: SCD's and PT/OT  HOB >30: Yes  Stress Ulcer: Home Protonix  Glucose Control: Yes, insulin management per Endocrinology     Lines/Drains/Airway:   ETT   Left radial arterial line   RIJ CVC   Lance   Chest Tubes: x2 (2 Mediastinal, 1 R Pleural)    Pacing Wires: Temporary ventricular pacing wires      Dispo/Code Status/Palliative:     - SICU    - Full Code

## 2024-03-17 NOTE — CARE UPDATE
-Glucose Goal 110-140    -A1C:   Hemoglobin A1C   Date Value Ref Range Status   03/14/2024 5.0 4.0 - 5.6 % Final     Comment:     ADA Screening Guidelines:  5.7-6.4%  Consistent with prediabetes  >or=6.5%  Consistent with diabetes    High levels of fetal hemoglobin interfere with the HbA1C  assay. Heterozygous hemoglobin variants (HbS, HgC, etc)do  not significantly interfere with this assay.   However, presence of multiple variants may affect accuracy.           -HOME REGIMEN: no hx of DM per chart review     -GLUCOSE TREND FOR THE PAST 24HRS:   Recent Labs   Lab 03/16/24  0536 03/16/24  0642 03/16/24  0724 03/16/24  0846 03/16/24  1709 03/16/24  2115   POCTGLUCOSE 103 103 153* 154* 121* 112*         -NO HYPOGYCEMIAS NOTED     - Diet  Diet Cardiac Fluid - 1500mL; Standard Tray    -TOLERATING 50 % OF PO DIET           Plan:   - MDC (150/25) prn ac/hs   - POCT glucose ac/hs   - Hypoglycemia protocol in place      ** Please notify Endocrine for any change and/or advance in diet**  ** Please call Endocrine for any BG related issues **     Discharge Planning:   TBD. Please notify endocrine prior to discharge.

## 2024-03-17 NOTE — PLAN OF CARE
[facility] - Surgical Intensive Care  Initial Discharge Assessment       Primary Care Provider: Marvin Bone MD    Admission Diagnosis: Nonrheumatic mitral valve regurgitation [I34.0]    Admission Date: 3/15/2024  Expected Discharge Date:     Transition of Care Barriers: None    Payor: MEDICARE / Plan: MEDICARE PART A & B / Product Type: Government /     Extended Emergency Contact Information  Primary Emergency Contact: SyedselvinAleida day  Address: [address] [place] [address] [place]  Mobile Phone: [phone]  Relation: Daughter  Preferred language: English   needed? No  Secondary Emergency Contact: Franklin Adrian  Address: [address] [place] [address] [place]  Mobile Phone: [phone]  Relation: Daughter    Discharge Plan A: Rehab  Discharge Plan B: [facility]      [id number] [place] [id number] [address] [facility] [address] [address]  Phone: [phone] Fax: [phone]    CM at bedside to discuss discharge planning assessment.   Patient lives with her mother in a one-story home.   Independent with ADL and did not use medical equipment prior to admit.   Patient expressed concerns about discharging home with home health secondary to weight-bearing restrictions.   She is requesting discharge to inpatient rehab with [facility] in [place], [place] as her first choice. PMR consult ordered.   Explained CM services during patient's stay in hospital.   My Health packet discussed and left with patient.    Initial Assessment (most recent)       Adult Discharge Assessment - 03/17/24 1520          Discharge Assessment    Assessment Type Discharge Planning Assessment     Confirmed/corrected address, phone number and insurance Yes     Confirmed Demographics Correct on Facesheet     Source of Information patient     Communicated LUIS MANUEL with patient/caregiver Date not  available/Unable to determine     Reason For Admission nonrheumatic mitral valve regurgitation     People in Home parent(s)     Facility Arrived From: home     Do you expect to return to your current living situation? Yes     Do you have help at home or someone to help you manage your care at home? No     Prior to hospitilization cognitive status: Alert/Oriented     Current cognitive status: Alert/Oriented     Walking or Climbing Stairs Difficulty no     Dressing/Bathing Difficulty no     Home Layout Able to live on 1st floor     Equipment Currently Used at Home none     Readmission within 30 days? No     Patient currently being followed by outpatient case management? No     Do you currently have service(s) that help you manage your care at home? No     Do you take prescription medications? Yes     Do you have prescription coverage? Yes     Coverage MEDICARE - MEDICARE PART A & B -     Do you have any problems affording any of your prescribed medications? No     Is the patient taking medications as prescribed? yes     How do you get to doctors appointments? car, drives self     Are you on dialysis? No     Discharge Plan A Rehab     Discharge Plan B Home Health     DME Needed Upon Discharge  --   TBD    Discharge Plan discussed with: Patient;Adult children     Transition of Care Barriers None        Physical Activity    On average, how many days per week do you engage in moderate to strenuous exercise (like a brisk walk)? 0 days     On average, how many minutes do you engage in exercise at this level? 0 min        Financial Resource Strain    How hard is it for you to pay for the very basics like food, housing, medical care, and heating? Not hard at all        Housing Stability    In the last 12 months, was there a time when you were not able to pay the mortgage or rent on time? No     In the last 12 months, how many places have you lived? 1     In the last 12 months, was there a time when you did not have a steady  place to sleep or slept in a shelter (including now)? No        Transportation Needs    In the past 12 months, has lack of transportation kept you from medical appointments or from getting medications? No     In the past 12 months, has lack of transportation kept you from meetings, work, or from getting things needed for daily living? No        Food Insecurity    Within the past 12 months, you worried that your food would run out before you got the money to buy more. Never true     Within the past 12 months, the food you bought just didn't last and you didn't have money to get more. Never true        Stress    Do you feel stress - tense, restless, nervous, or anxious, or unable to sleep at night because your mind is troubled all the time - these days? Only a little        Social Connections    In a typical week, how many times do you talk on the phone with family, friends, or neighbors? More than three times a week     How often do you get together with friends or relatives? More than three times a week     How often do you attend Ep[employer]op[employer] or [employer] services? Patient declined     How often do you attend meetings of the clubs or organizations you belong to? More than 4 times per year     Are you , , , , never , or living with a partner?         Alcohol Use    Q1: How often do you have a drink containing alcohol? 4 or more times a week     Q2: How many drinks containing alcohol do you have on a typical day when you are drinking? 1 or 2     Q3: How often do you have six or more drinks on one occasion? Never

## 2024-03-17 NOTE — PROGRESS NOTES
[facility] - [facility] Int[facility]  Critical Care - Surgery  Progress Note    Patient Name: Mora Adrian  MRN: [id number]  Admission Date: 3/15/2024  Hospital Length of Stay: 2 days  Code Status: Full Code  Attending Provider: Pedrito Bernal MD  Primary Care Provider: Marvin Bone MD   Principal Problem: Nonrheumatic mitral valve regurgitation    Subjective:     Hospital/ICU Course:  No notes on file    Interval History/Significant Events: Epi off at 10 AM yesterday. Clevi on at 2mg/hr this morning for HTN overnight.    Follow-up For: Procedure(s) (LRB):  REPAIR, MITRAL VALVE, OPEN (N/A)  EXCLUSION, LEFT ATRIAL APPENDAGE, OPEN, AS PART OF OPEN CHEST SURGERY (N/A)    Post-Operative Day: 2 Days Post-Op    Objective:     Vital Signs (Most Recent):  Temp: 97.6 °F (36.4 °C) (03/17/24 0715)  Pulse: 83 (03/17/24 0800)  Resp: 15 (03/17/24 0800)  BP: (!) 131/53 (03/17/24 0803)  SpO2: 95 % (03/17/24 0800) Vital Signs (24h Range):  Temp:  [97.6 °F (36.4 °C)-98.3 °F (36.8 °C)] 97.6 °F (36.4 °C)  Pulse:  [] 83  Resp:  [5-29] 15  SpO2:  [86 %-100 %] 95 %  BP: ()/(40-63) 131/53  Arterial Line BP: ()/(40-71) 135/53     Weight: 102.9 kg (226 lb 13.7 oz)  Body mass index is 36.62 kg/m².      Intake/Output Summary (Last 24 hours) at 3/17/2024 0848  Last data filed at 3/17/2024 0800  Gross per 24 hour   Intake 212.2 ml   Output 755 ml   Net -542.8 ml          Physical Exam  Vitals and nursing note reviewed.   Constitutional:       Appearance: Normal appearance.      Interventions: Nasal cannula in place.   HENT:      Head: Normocephalic and atraumatic.   Eyes:      Conjunctiva/sclera: Conjunctivae normal.   Neck:      Trachea: Trachea normal.   Cardiovascular:      Rate and Rhythm: Normal rate and regular rhythm.      Pulses: Normal pulses.      Heart sounds: Normal heart sounds.   Pulmonary:      Effort: Pulmonary effort is normal. No respiratory distress or retractions.      Breath sounds: Normal breath  sounds.   Chest:      Comments: Chest incision CDI  2 Meds, 1 R pleural chest tubes to suction  Abdominal:      General: Abdomen is flat. Bowel sounds are normal.      Palpations: Abdomen is soft.   Genitourinary:     Comments: Lance  Musculoskeletal:         General: No swelling.      Cervical back: Normal range of motion.   Skin:     General: Skin is warm and dry.      Capillary Refill: Capillary refill takes less than 2 seconds.   Neurological:      Mental Status: She is alert. Mental status is at baseline.   Psychiatric:         Mood and Affect: Mood normal.         Behavior: Behavior normal. Behavior is cooperative.            Vents:      Lines/Drains/Airways       Central Venous Catheter Line  Duration              Introducer with Double Lumen 03/15/24 0800 Internal Jugular Right 2 days    Percutaneous Central Line - Triple Lumen  03/15/24 0800 Internal Jugular Right 2 days              Drain  Duration                  Urethral Catheter 03/15/24 0811 Temperature probe 16 Fr. 2 days         Chest Tube 03/15/24 1303 Tube - 3 Right Pleural 19 Fr. 1 day         Y Chest Tube 1 and 2 03/15/24 1303 1 Anterior Mediastinal 24 Fr. 2 Mediastinal 19 Fr. 1 day              Arterial Line  Duration             Arterial Line 03/15/24 0740 Left Radial 2 days              Line  Duration                  Pacer Wires 03/15/24 1151 1 day              Peripheral Intravenous Line  Duration                  Peripheral IV - Single Lumen 03/15/24 0603 20 G Left;Posterior Hand 2 days         Peripheral IV - Single Lumen 03/15/24 0805 16 G Right;Posterior Forearm 2 days                    Significant Labs:    CBC/Anemia Profile:  Recent Labs   Lab 03/15/24  1354 03/15/24  1437 03/15/24  1606 03/16/24  0340 03/17/24  0350   WBC 15.26*  15.26*  --   --  10.02 7.18   HGB 11.6*  11.6*  --   --  9.7* 8.5*   HCT 35.2*  35.2*   < > 36 30.2* 27.4*     178  --   --  155 119*   *  101*  --   --  103* 104*   RDW 13.4  13.4  --    --  13.9 14.0    < > = values in this interval not displayed.        Chemistries:  Recent Labs   Lab 03/15/24  1354 03/16/24  0340 03/17/24  0350     136 133* 130*   K 4.1  4.1 4.4 4.2     109 108 106   CO2 14*  14* 17* 18*   BUN 17  17 20 29*   CREATININE 0.8  0.8 1.2 1.9*   CALCIUM 9.5  9.5 8.8 8.2*   ALBUMIN 3.0*  3.0* 3.4* 3.0*   PROT 5.5*  5.5* 5.6* 5.3*   BILITOT 0.5  0.5 0.3 0.2   ALKPHOS 65  65 54* 55   ALT 22  22 14 6*   AST 54*  54* 49* 34   MG 2.4  2.4 1.9 2.3   PHOS 3.1  3.1 4.6* 4.4       All pertinent labs within the past 24 hours have been reviewed.    Significant Imaging:  I have reviewed all pertinent imaging results/findings within the past 24 hours.  Assessment/Plan:     Cardiac/Vascular  * Nonrheumatic mitral valve regurgitation  Mora Adrian is a 68 y.o. female with a past medical history significant for anxiety, arthritis, GERD, hypertension, hyperlipidemia, hypothyroidism and HFrEF. She reports she was preparing for a knee operation and attempted to get cardiac clearance. She underwent an evaluation which demonstrated severe mitral regurgitation, severely dilated left and right atrium, and an ejection fraction of 35-40%. She denied any symptoms including dyspnea on exertion, chest pain, palpations, or lower extremity edema.        Neuro/Psych:     - Sedation:  n/a    - Pain:    - Scheduled Tylenol 1g q8h   -  Oxy PRN and dilaudid 0.2 IV breakthrough              Cardiac:     - S/P Mitral Valve repair and Left Atrial Appendage Ligation with Dr. Bernal on 3/15/24    - BP Goal: MAP 60-80     - Cleviprex PRN    - Pressors: Epi off    - Anti-HTNs: Will start when appropriate    - Rhythm: NSR    - Beta blocker: Will start when appropriate    - Statin: Atorvastatin 40      Pulmonary:     - Goal SpO2 >92%    - Chest Tubes x 3 (2 Meds & 1 R Pleural)    - ABGs PRN      Renal:    - Trend BUN/Cr     - Maintain Lance, record strict Is/Os  - Lasix 20 mg IV BID    Recent Labs    Lab 03/15/24  1354 03/16/24  0340 03/17/24  0350   BUN 17  17 20 29*   CREATININE 0.8  0.8 1.2 1.9*           FEN / GI:     - Daily CMP, PRN K/Mag/Phos per protocol     - Replace electrolytes as needed    - Nutrition: Cardiac diet    - Bowel Regimen: Miralax, docusate      ID:     - Afebrile    - WBC stable    - Abx: Complete perioperative cefazolin 2g Q8H x 5 doses    Recent Labs   Lab 03/15/24  1354 03/16/24  0340 03/17/24  0350   WBC 15.26*  15.26* 10.02 7.18           Heme/Onc:     - Hgb 11.7 pre-operatively    - CBC daily    - ASA 325mg daily    Recent Labs   Lab 03/15/24  1354 03/16/24  0340 03/17/24  0350   HGB 11.6*  11.6* 9.7* 8.5*     178 155 119*   APTT 28.5  28.5 <21.0 25.1   INR 1.1  1.1 1.0 1.0           Endocrine:     - CTS Goal -140    - HgbA1c:     - Endocrinology consulted for insulin management      PPx:   Feeding: Tolerating Cardiac diet   Analgesia/Sedation: Multimodal  Thromboembolic Prevention: SCD's and PT/OT  HOB >30: Yes  Stress Ulcer: Home Protonix  Glucose Control: Yes, insulin management per Endocrinology     Lines/Drains/Airway:   ETT   Left radial arterial line   RIJ CVC   Lance   Chest Tubes: x2 (2 Mediastinal, 1 R Pleural)    Pacing Wires: Temporary ventricular pacing wires      Dispo/Code Status/Palliative:     - SICU    - Full Code         Critical care was time spent personally by me on the following activities: development of treatment plan with patient or surrogate and bedside caregivers, discussions with consultants, evaluation of patient's response to treatment, examination of patient, ordering and performing treatments and interventions, ordering and review of laboratory studies, ordering and review of radiographic studies, pulse oximetry, re-evaluation of patient's condition.  This critical care time did not overlap with that of any other provider or involve time for any procedures.     Tino Nugent, DO  Critical Care - Surgery  [facility] - Surgical  Intensive Care

## 2024-03-17 NOTE — SUBJECTIVE & OBJECTIVE
Interval History/Significant Events: Epi off at 10 AM yesterday. Clevi on at 2mg/hr this morning for HTN overnight.    Follow-up For: Procedure(s) (LRB):  REPAIR, MITRAL VALVE, OPEN (N/A)  EXCLUSION, LEFT ATRIAL APPENDAGE, OPEN, AS PART OF OPEN CHEST SURGERY (N/A)    Post-Operative Day: 2 Days Post-Op    Objective:     Vital Signs (Most Recent):  Temp: 97.6 °F (36.4 °C) (03/17/24 0715)  Pulse: 83 (03/17/24 0800)  Resp: 15 (03/17/24 0800)  BP: (!) 131/53 (03/17/24 0803)  SpO2: 95 % (03/17/24 0800) Vital Signs (24h Range):  Temp:  [97.6 °F (36.4 °C)-98.3 °F (36.8 °C)] 97.6 °F (36.4 °C)  Pulse:  [] 83  Resp:  [5-29] 15  SpO2:  [86 %-100 %] 95 %  BP: ()/(40-63) 131/53  Arterial Line BP: ()/(40-71) 135/53     Weight: 102.9 kg (226 lb 13.7 oz)  Body mass index is 36.62 kg/m².      Intake/Output Summary (Last 24 hours) at 3/17/2024 0848  Last data filed at 3/17/2024 0800  Gross per 24 hour   Intake 212.2 ml   Output 755 ml   Net -542.8 ml          Physical Exam  Vitals and nursing note reviewed.   Constitutional:       Appearance: Normal appearance.      Interventions: Nasal cannula in place.   HENT:      Head: Normocephalic and atraumatic.   Eyes:      Conjunctiva/sclera: Conjunctivae normal.   Neck:      Trachea: Trachea normal.   Cardiovascular:      Rate and Rhythm: Normal rate and regular rhythm.      Pulses: Normal pulses.      Heart sounds: Normal heart sounds.   Pulmonary:      Effort: Pulmonary effort is normal. No respiratory distress or retractions.      Breath sounds: Normal breath sounds.   Chest:      Comments: Chest incision CDI  2 Meds, 1 R pleural chest tubes to suction  Abdominal:      General: Abdomen is flat. Bowel sounds are normal.      Palpations: Abdomen is soft.   Genitourinary:     Comments: Lance  Musculoskeletal:         General: No swelling.      Cervical back: Normal range of motion.   Skin:     General: Skin is warm and dry.      Capillary Refill: Capillary refill takes less  than 2 seconds.   Neurological:      Mental Status: She is alert. Mental status is at baseline.   Psychiatric:         Mood and Affect: Mood normal.         Behavior: Behavior normal. Behavior is cooperative.            Vents:  Vent Mode: Spont (03/15/24 1606)  Set Rate: 16 BPM (03/15/24 1503)  Vt Set: 360 mL (03/15/24 1503)  Pressure Support: 5 cmH20 (03/15/24 1606)  PEEP/CPAP: 5 cmH20 (03/15/24 1606)  Oxygen Concentration (%): 28 (03/17/24 0413)  Peak Airway Pressure: 11 cmH20 (03/15/24 1606)  Plateau Pressure: 0 cmH20 (03/15/24 1606)  Total Ve: 11.1 L/m (03/15/24 1606)  Negative Inspiratory Force (cm H2O): 0 (03/15/24 1606)  F/VT Ratio<105 (RSBI): (!) 23.22 (03/15/24 1606)    Lines/Drains/Airways       Central Venous Catheter Line  Duration              Introducer with Double Lumen 03/15/24 0800 Internal Jugular Right 2 days    Percutaneous Central Line - Triple Lumen  03/15/24 0800 Internal Jugular Right 2 days              Drain  Duration                  Urethral Catheter 03/15/24 0811 Temperature probe 16 Fr. 2 days         Chest Tube 03/15/24 1303 Tube - 3 Right Pleural 19 Fr. 1 day         Y Chest Tube 1 and 2 03/15/24 1303 1 Anterior Mediastinal 24 Fr. 2 Mediastinal 19 Fr. 1 day              Arterial Line  Duration             Arterial Line 03/15/24 0740 Left Radial 2 days              Line  Duration                  Pacer Wires 03/15/24 1151 1 day              Peripheral Intravenous Line  Duration                  Peripheral IV - Single Lumen 03/15/24 0603 20 G Left;Posterior Hand 2 days         Peripheral IV - Single Lumen 03/15/24 0805 16 G Right;Posterior Forearm 2 days                    Significant Labs:    CBC/Anemia Profile:  Recent Labs   Lab 03/15/24  1354 03/15/24  1437 03/15/24  1606 03/16/24  0340 03/17/24  0350   WBC 15.26*  15.26*  --   --  10.02 7.18   HGB 11.6*  11.6*  --   --  9.7* 8.5*   HCT 35.2*  35.2*   < > 36 30.2* 27.4*     178  --   --  155 119*   *  101*  --    --  103* 104*   RDW 13.4  13.4  --   --  13.9 14.0    < > = values in this interval not displayed.        Chemistries:  Recent Labs   Lab 03/15/24  1354 03/16/24  0340 03/17/24  0350     136 133* 130*   K 4.1  4.1 4.4 4.2     109 108 106   CO2 14*  14* 17* 18*   BUN 17  17 20 29*   CREATININE 0.8  0.8 1.2 1.9*   CALCIUM 9.5  9.5 8.8 8.2*   ALBUMIN 3.0*  3.0* 3.4* 3.0*   PROT 5.5*  5.5* 5.6* 5.3*   BILITOT 0.5  0.5 0.3 0.2   ALKPHOS 65  65 54* 55   ALT 22  22 14 6*   AST 54*  54* 49* 34   MG 2.4  2.4 1.9 2.3   PHOS 3.1  3.1 4.6* 4.4       All pertinent labs within the past 24 hours have been reviewed.    Significant Imaging:  I have reviewed all pertinent imaging results/findings within the past 24 hours.

## 2024-03-17 NOTE — NURSING
SICU PLAN OF CARE    Dx: Nonrheumatic mitral valve regurgitation    Goals of Care: MAP 60-80    Vital Signs (last 12 hours):   Pulse:  [72-91]   Resp:  [5-18]   SpO2:  [86 %-100 %]   Arterial Line BP: ()/(42-64)      Neuro: AAOx4, Follows commands , and Moves all extremities spontaneously     Cardiac: NSR    Respiratory:  1L Nasal Cannula     Gtts: Cleviprex     Urine Output: Urethral Catheter  350 mL/shift      Drains: Pleural Chest Tube, total output 0mL/shift  Mediastinal Chest Tubes, 50 total output mL/shift    Diet: Cardiac  and 1500mL Fluid Restriction       Labs/Accuchecks: ACHS accuchecks, Daily AM labs    Skin:  No new skin breakdown noted this shift. Heel foams, sacral foam & SCDs in place. Midsternal & chest tube dressings dry & intact.  Patient turned independently, bony prominences protected, and mattress inflated/working correctly.   Bryan Score: 19. If Bryan Score is 16 or less, complete 4EYES note each shift.    Shift Events:  NAEON. Epi & cleviprex titrated for BP goals. PRN meds given for pain control. See flowsheet for further assessment/details.  Family updated on current condition/plan of care, questions answered, and emotional support provided.  MD updated on current condition, vitals, labs, and gtts.

## 2024-03-18 LAB
ALBUMIN SERPL BCP-MCNC: 3.1 G/DL (ref 3.5–5.2)
ALP SERPL-CCNC: 55 U/L (ref 55–135)
ALT SERPL W/O P-5'-P-CCNC: <5 U/L (ref 10–44)
ANION GAP SERPL CALC-SCNC: 8 MMOL/L (ref 8–16)
APTT PPP: 26.5 SEC (ref 21–32)
AST SERPL-CCNC: 23 U/L (ref 10–40)
BASOPHILS # BLD AUTO: 0.03 K/UL (ref 0–0.2)
BASOPHILS NFR BLD: 0.5 % (ref 0–1.9)
BILIRUB SERPL-MCNC: 0.4 MG/DL (ref 0.1–1)
BLD PROD TYP BPU: NORMAL
BLD PROD TYP BPU: NORMAL
BLOOD UNIT EXPIRATION DATE: NORMAL
BLOOD UNIT EXPIRATION DATE: NORMAL
BLOOD UNIT TYPE CODE: 9500
BLOOD UNIT TYPE CODE: 9500
BLOOD UNIT TYPE: NORMAL
BLOOD UNIT TYPE: NORMAL
BUN SERPL-MCNC: 26 MG/DL (ref 8–23)
CALCIUM SERPL-MCNC: 8.3 MG/DL (ref 8.7–10.5)
CHLORIDE SERPL-SCNC: 105 MMOL/L (ref 95–110)
CO2 SERPL-SCNC: 21 MMOL/L (ref 23–29)
CODING SYSTEM: NORMAL
CODING SYSTEM: NORMAL
CREAT SERPL-MCNC: 1.1 MG/DL (ref 0.5–1.4)
CROSSMATCH INTERPRETATION: NORMAL
CROSSMATCH INTERPRETATION: NORMAL
DIFFERENTIAL METHOD BLD: ABNORMAL
DISPENSE STATUS: NORMAL
DISPENSE STATUS: NORMAL
EOSINOPHIL # BLD AUTO: 0.1 K/UL (ref 0–0.5)
EOSINOPHIL NFR BLD: 1.1 % (ref 0–8)
ERYTHROCYTE [DISTWIDTH] IN BLOOD BY AUTOMATED COUNT: 13.2 % (ref 11.5–14.5)
EST. GFR  (NO RACE VARIABLE): 54.7 ML/MIN/1.73 M^2
GLUCOSE SERPL-MCNC: 100 MG/DL (ref 70–110)
HCT VFR BLD AUTO: 24.5 % (ref 37–48.5)
HGB BLD-MCNC: 8 G/DL (ref 12–16)
IMM GRANULOCYTES # BLD AUTO: 0.03 K/UL (ref 0–0.04)
IMM GRANULOCYTES NFR BLD AUTO: 0.5 % (ref 0–0.5)
INR PPP: 1 (ref 0.8–1.2)
LYMPHOCYTES # BLD AUTO: 1.5 K/UL (ref 1–4.8)
LYMPHOCYTES NFR BLD: 26 % (ref 18–48)
MAGNESIUM SERPL-MCNC: 1.8 MG/DL (ref 1.6–2.6)
MCH RBC QN AUTO: 32.8 PG (ref 27–31)
MCHC RBC AUTO-ENTMCNC: 32.7 G/DL (ref 32–36)
MCV RBC AUTO: 100 FL (ref 82–98)
MONOCYTES # BLD AUTO: 0.6 K/UL (ref 0.3–1)
MONOCYTES NFR BLD: 11.4 % (ref 4–15)
NEUTROPHILS # BLD AUTO: 3.4 K/UL (ref 1.8–7.7)
NEUTROPHILS NFR BLD: 60.5 % (ref 38–73)
NRBC BLD-RTO: 0 /100 WBC
OHS QRS DURATION: 84 MS
OHS QTC CALCULATION: 455 MS
PHOSPHATE SERPL-MCNC: 2.5 MG/DL (ref 2.7–4.5)
PLATELET # BLD AUTO: 94 K/UL (ref 150–450)
PMV BLD AUTO: 10.4 FL (ref 9.2–12.9)
POCT GLUCOSE: 82 MG/DL (ref 70–110)
POCT GLUCOSE: 93 MG/DL (ref 70–110)
POTASSIUM SERPL-SCNC: 4.4 MMOL/L (ref 3.5–5.1)
PROT SERPL-MCNC: 5.1 G/DL (ref 6–8.4)
PROTHROMBIN TIME: 10.4 SEC (ref 9–12.5)
RBC # BLD AUTO: 2.44 M/UL (ref 4–5.4)
SODIUM SERPL-SCNC: 134 MMOL/L (ref 136–145)
TRANS ERYTHROCYTES VOL PATIENT: NORMAL ML
TRANS ERYTHROCYTES VOL PATIENT: NORMAL ML
WBC # BLD AUTO: 5.62 K/UL (ref 3.9–12.7)

## 2024-03-18 PROCEDURE — 20600001 HC STEP DOWN PRIVATE ROOM

## 2024-03-18 PROCEDURE — 63600175 PHARM REV CODE 636 W HCPCS

## 2024-03-18 PROCEDURE — 25000003 PHARM REV CODE 250: Performed by: STUDENT IN AN ORGANIZED HEALTH CARE EDUCATION/TRAINING PROGRAM

## 2024-03-18 PROCEDURE — 99291 CRITICAL CARE FIRST HOUR: CPT | Mod: ,,, | Performed by: ANESTHESIOLOGY

## 2024-03-18 PROCEDURE — 25000003 PHARM REV CODE 250

## 2024-03-18 PROCEDURE — 83735 ASSAY OF MAGNESIUM: CPT | Performed by: STUDENT IN AN ORGANIZED HEALTH CARE EDUCATION/TRAINING PROGRAM

## 2024-03-18 PROCEDURE — 25000003 PHARM REV CODE 250: Performed by: ANESTHESIOLOGY

## 2024-03-18 PROCEDURE — 97116 GAIT TRAINING THERAPY: CPT

## 2024-03-18 PROCEDURE — 84100 ASSAY OF PHOSPHORUS: CPT | Performed by: STUDENT IN AN ORGANIZED HEALTH CARE EDUCATION/TRAINING PROGRAM

## 2024-03-18 PROCEDURE — 25000003 PHARM REV CODE 250: Performed by: PHYSICIAN ASSISTANT

## 2024-03-18 PROCEDURE — 85730 THROMBOPLASTIN TIME PARTIAL: CPT | Performed by: STUDENT IN AN ORGANIZED HEALTH CARE EDUCATION/TRAINING PROGRAM

## 2024-03-18 PROCEDURE — 63600175 PHARM REV CODE 636 W HCPCS: Performed by: PHYSICIAN ASSISTANT

## 2024-03-18 PROCEDURE — 97530 THERAPEUTIC ACTIVITIES: CPT

## 2024-03-18 PROCEDURE — 80053 COMPREHEN METABOLIC PANEL: CPT | Performed by: STUDENT IN AN ORGANIZED HEALTH CARE EDUCATION/TRAINING PROGRAM

## 2024-03-18 PROCEDURE — 85025 COMPLETE CBC W/AUTO DIFF WBC: CPT | Performed by: STUDENT IN AN ORGANIZED HEALTH CARE EDUCATION/TRAINING PROGRAM

## 2024-03-18 PROCEDURE — 85610 PROTHROMBIN TIME: CPT | Performed by: STUDENT IN AN ORGANIZED HEALTH CARE EDUCATION/TRAINING PROGRAM

## 2024-03-18 PROCEDURE — 97535 SELF CARE MNGMENT TRAINING: CPT

## 2024-03-18 RX ORDER — PROCHLORPERAZINE EDISYLATE 5 MG/ML
2.5 INJECTION INTRAMUSCULAR; INTRAVENOUS EVERY 6 HOURS PRN
Status: DISCONTINUED | OUTPATIENT
Start: 2024-03-18 | End: 2024-03-25 | Stop reason: HOSPADM

## 2024-03-18 RX ADMIN — DOCUSATE SODIUM 100 MG: 100 CAPSULE, LIQUID FILLED ORAL at 09:03

## 2024-03-18 RX ADMIN — PANTOPRAZOLE SODIUM 40 MG: 40 TABLET, DELAYED RELEASE ORAL at 09:03

## 2024-03-18 RX ADMIN — ACETAMINOPHEN 1000 MG: 500 TABLET ORAL at 02:03

## 2024-03-18 RX ADMIN — OXYCODONE HYDROCHLORIDE 10 MG: 10 TABLET ORAL at 08:03

## 2024-03-18 RX ADMIN — ASPIRIN 325 MG: 325 TABLET, COATED ORAL at 09:03

## 2024-03-18 RX ADMIN — MUPIROCIN 1 G: 20 OINTMENT TOPICAL at 09:03

## 2024-03-18 RX ADMIN — POLYETHYLENE GLYCOL 3350 17 G: 17 POWDER, FOR SOLUTION ORAL at 09:03

## 2024-03-18 RX ADMIN — MAGNESIUM SULFATE HEPTAHYDRATE 2 G: 40 INJECTION, SOLUTION INTRAVENOUS at 07:03

## 2024-03-18 RX ADMIN — TRAZODONE HYDROCHLORIDE 100 MG: 100 TABLET ORAL at 09:03

## 2024-03-18 RX ADMIN — LEVOTHYROXINE SODIUM 150 MCG: 150 TABLET ORAL at 06:03

## 2024-03-18 RX ADMIN — OXYCODONE 5 MG: 5 TABLET ORAL at 09:03

## 2024-03-18 RX ADMIN — EZETIMIBE 10 MG: 10 TABLET ORAL at 09:03

## 2024-03-18 RX ADMIN — ACETAMINOPHEN 1000 MG: 500 TABLET ORAL at 09:03

## 2024-03-18 RX ADMIN — METOPROLOL TARTRATE 25 MG: 25 TABLET, FILM COATED ORAL at 09:03

## 2024-03-18 RX ADMIN — OXYCODONE HYDROCHLORIDE 10 MG: 10 TABLET ORAL at 04:03

## 2024-03-18 RX ADMIN — ACETAMINOPHEN 1000 MG: 500 TABLET ORAL at 06:03

## 2024-03-18 RX ADMIN — OXYCODONE HYDROCHLORIDE 10 MG: 10 TABLET ORAL at 03:03

## 2024-03-18 RX ADMIN — SODIUM PHOSPHATE, MONOBASIC, MONOHYDRATE AND SODIUM PHOSPHATE, DIBASIC, ANHYDROUS 15 MMOL: 142; 276 INJECTION, SOLUTION INTRAVENOUS at 07:03

## 2024-03-18 NOTE — CONSULTS
Inpatient consult to Physical Medicine Rehab  Consult performed by: Prisca Cardona NP  Consult ordered by: Pedrito Bernal MD  Reason for consult: rehab      Consult received.     MALENA Myers, FNP-C  Physical Medicine & Rehabilitation   03/18/2024

## 2024-03-18 NOTE — ASSESSMENT & PLAN NOTE
Mora Adrian is a 68 y.o. female with a past medical history significant for anxiety, arthritis, GERD, hypertension, hyperlipidemia, hypothyroidism and HFrEF. She reports she was preparing for a knee operation and attempted to get cardiac clearance. She underwent an evaluation which demonstrated severe mitral regurgitation, severely dilated left and right atrium, and an ejection fraction of 35-40%. She denied any symptoms including dyspnea on exertion, chest pain, palpations, or lower extremity edema.        Neuro/Psych:     - Sedation:  n/a    - Pain:    - Scheduled Tylenol 1g q8h   -  Oxy PRN and dilaudid 0.2 IV breakthrough              Cardiac:     - S/P Mitral Valve repair and Left Atrial Appendage Ligation with Dr. Bernal on 3/15/24    - BP Goal: MAP 60-80     - Cleviprex PRN    - Pressors: Epi off    - Anti-HTNs: Will start when appropriate    - Rhythm: NSR    - Beta blocker: Will start when appropriate    - Statin: Atorvastatin 40      Pulmonary:     - Goal SpO2 >92%    - Chest Tubes x 3 (2 Meds & 1 R Pleural)    - ABGs PRN      Renal:    - Trend BUN/Cr     - Maintain Lance, record strict Is/Os  - Lasix 20 mg IV BID    Recent Labs   Lab 03/17/24  1400 03/17/24  2126 03/18/24  0346   BUN 26* 26* 26*   CREATININE 1.5* 1.4 1.1           FEN / GI:     - Daily CMP, PRN K/Mag/Phos per protocol     - Replace electrolytes as needed    - Nutrition: Cardiac diet    - Bowel Regimen: Miralax, docusate      ID:     - Afebrile    - WBC stable    - Abx: Complete perioperative cefazolin 2g Q8H x 5 doses    Recent Labs   Lab 03/16/24  0340 03/17/24  0350 03/18/24  0346   WBC 10.02 7.18 5.62           Heme/Onc:     - Hgb 11.7 pre-operatively    - CBC daily    - ASA 325mg daily    Recent Labs   Lab 03/16/24  0340 03/17/24  0350 03/18/24  0346   HGB 9.7* 8.5* 8.0*    119* 94*   APTT <21.0 25.1 26.5   INR 1.0 1.0 1.0           Endocrine:     - CTS Goal -140    - HgbA1c:     - Endocrinology consulted  The dilator was inserted into the  left femoral vein.  for insulin management      PPx:   Feeding: Tolerating Cardiac diet   Analgesia/Sedation: Multimodal  Thromboembolic Prevention: SCD's and PT/OT  HOB >30: Yes  Stress Ulcer: Home Protonix  Glucose Control: Yes, insulin management per Endocrinology     Lines/Drains/Airway:   ETT   Left radial arterial line   RIJ CVC   Lance   Chest Tubes: x2 (2 Mediastinal, 1 R Pleural)    Pacing Wires: Temporary ventricular pacing wires      Dispo/Code Status/Palliative:     - SICU    - Full Code

## 2024-03-18 NOTE — PT/OT/SLP PROGRESS
"Occupational Therapy   Treatment    Name: Mora Adrian  MRN: [id number]  Admitting Diagnosis:  Nonrheumatic mitral valve regurgitation  3 Days Post-Op    Recommendations:     Discharge Recommendations: Low Intensity Therapy  Discharge Equipment Recommendations:  none  Barriers to discharge:  None    Assessment:     Mora Adrian is a 68 y.o. female with a medical diagnosis of Nonrheumatic mitral valve regurgitation.  She presents with the following performance deficits affecting function: weakness, impaired endurance, impaired self care skills, impaired functional mobility, decreased coordination, gait instability, decreased upper extremity function, decreased lower extremity function, pain, orthopedic precautions. Pt willing to participate and tolerated well overall. Pt demonstrated improved activity tolerance performing ADLs standing at sink c/ good adherence to sternal precautions. Pt motivated to progress c/ therapy.     Rehab Prognosis:  Good; patient would benefit from acute skilled OT services to address these deficits and reach maximum level of function.       Plan:     Patient to be seen 5 x/week to address the above listed problems via self-care/home management, therapeutic activities, therapeutic exercises, neuromuscular re-education  Plan of Care Expires: 04/15/24  Plan of Care Reviewed with: patient, daughter    Subjective     Chief Complaint: lightheadedness upon standing  Patient/Family Comments/goals: "I would love to brush my teeth."  Pain/Comfort:  Pain Rating 1:  (not rated)  Pain Rating Post-Intervention 1:  (not rated)    Objective:     Communicated with: RN prior to session.  Patient found up in chair with arterial line, telemetry, pulse ox (continuous), blood pressure cuff, oxygen, central line upon OT entry to room.    General Precautions: Standard, fall, sternal    Orthopedic Precautions:N/A  Braces: N/A  Respiratory Status: Nasal cannula, flow 3 L/min     Occupational Performance: "     Bed Mobility:    Pt started and finished session up in chair    Functional Mobility/Transfers:  Patient completed Sit <> Stand Transfer with minimum assistance  with  no assistive device   Chair t/f: CGA no AD  Functional Mobility: Min A no AD from chair to sink back to chair; unsteady but no LOB    Activities of Daily Living:  Grooming: contact guard assistance brushed teeth, washed face standing at sink  Upper Body Dressing: maximal assistance donned gown as robe sitting eob  Toileting: pt not needing to void at this time        [facility] 6 Click ADL: 16    Treatment & Education:  Pt edu on role of OT, POC, safety when performing self care tasks , benefit of performing OOB activity, and safety when performing functional transfers and mobility.  - White board updated  - Self care tasks completed-- as noted above      Patient left up in chair with all lines intact, call button in reach, RN notified, and family present    GOALS:   Multidisciplinary Problems       Occupational Therapy Goals          Problem: Occupational Therapy    Goal Priority Disciplines Outcome Interventions   Occupational Therapy Goal     OT, PT/OT Ongoing, Progressing    Description: Goals to be met by: 03-30-24     Patient will increase functional independence with ADLs by performing:    UE Dressing with [place].  LE Dressing with Supervision.  Grooming while standing at sink with Supervision.  Toileting from toilet with Supervision for hygiene and clothing management.   Supine to sit with Supervision.  Stand pivot transfers with Supervision.  Toilet transfer to toilet with Supervision.  Pt. To recall sternal precautions with 100% accuracy                         Time Tracking:     OT Date of Treatment: 03/18/24  OT Start Time: 0956  OT Stop Time: 1028  OT Total Time (min): 32 min    Billable Minutes:Self Care/Home Management 32    OT/HERMAN: OT          3/18/2024

## 2024-03-18 NOTE — NURSING
MAP 55-60, HR 60s-70s, pt AAOx4, has transfer orders. Dr. CRYSTAL Silvestre made aware. See orders/mar. Pt's HR and BP being closely monitored at this time.

## 2024-03-18 NOTE — PROGRESS NOTES
[facility] - [facility]  Critical Care - Surgery  Progress Note    Patient Name: Mora Adrian  MRN: [id number]  Admission Date: 3/15/2024  Hospital Length of Stay: 3 days  Code Status: Full Code  Attending Provider: Pedrito Bernal MD  Primary Care Provider: Marvin Bone MD   Principal Problem: Nonrheumatic mitral valve regurgitation    Subjective:     Hospital/ICU Course:  68F s/p mitral valve repair and left atrial appendage resection on 3/15/24 with Dr. Bernal.     Interval History/Significant Events:   Patient hypotension overnight 70s/40s. No changes in mental status or other symptoms. Given 500 mL albumin. Pressures improved. Still soft, MAPs low 60s while sleeping. Improve with awakening.     No complaints this morning. PWC. Voiding spontaneously. Passing flatus. No BM yet.     Follow-up For: Procedure(s) (LRB):  REPAIR, MITRAL VALVE, OPEN (N/A)  EXCLUSION, LEFT ATRIAL APPENDAGE, OPEN, AS PART OF OPEN CHEST SURGERY (N/A)    Post-Operative Day: 3 Days Post-Op    Objective:     Vital Signs (Most Recent):  Temp: 97.9 °F (36.6 °C) (03/18/24 0300)  Pulse: 78 (03/18/24 0600)  Resp: 10 (03/18/24 0600)  BP: (!) 116/53 (03/18/24 0545)  SpO2: 96 % (03/18/24 0600) Vital Signs (24h Range):  Temp:  [97.6 °F (36.4 °C)-98.9 °F (37.2 °C)] 97.9 °F (36.6 °C)  Pulse:  [59-95] 78  Resp:  [7-25] 10  SpO2:  [89 %-100 %] 96 %  BP: ()/(40-61) 116/53  Arterial Line BP: ()/(35-69) 91/43     Weight: 102.9 kg (226 lb 13.7 oz)  Body mass index is 36.62 kg/m².      Intake/Output Summary (Last 24 hours) at 3/18/2024 0608  Last data filed at 3/18/2024 0400  Gross per 24 hour   Intake 518.61 ml   Output 2780 ml   Net -2261.39 ml          Physical Exam  WA, NAD  NCAT, EOMI, NC in place  RRR per bedside monitor, CDI, wires in place (not currently being paced)  NWOB on 3L NC   S, NTTP  Aox3, moving all four, SILT  No pretibial edema   WWP  : deferred   Mood, affect normal     Vents:  Vent Mode: Spont  (03/15/24 1606)  Set Rate: 16 BPM (03/15/24 1503)  Vt Set: 360 mL (03/15/24 1503)  Pressure Support: 5 cmH20 (03/15/24 1606)  PEEP/CPAP: 5 cmH20 (03/15/24 1606)  Oxygen Concentration (%): 32 (03/18/24 0337)  Peak Airway Pressure: 11 cmH20 (03/15/24 1606)  Plateau Pressure: 0 cmH20 (03/15/24 1606)  Total Ve: 11.1 L/m (03/15/24 1606)  Negative Inspiratory Force (cm H2O): 0 (03/15/24 1606)  F/VT Ratio<105 (RSBI): (!) 23.22 (03/15/24 1606)    Lines/Drains/Airways       Arterial Line  Duration             Arterial Line 03/15/24 0740 Left Radial 2 days              Line  Duration                  Pacer Wires 03/15/24 1151 2 days              Peripheral Intravenous Line  Duration                  Peripheral IV - Single Lumen 03/15/24 0603 20 G Left;Posterior Hand 3 days         Peripheral IV - Single Lumen 03/15/24 0805 16 G Right;Posterior Forearm 2 days                    Significant Labs:    CBC/Anemia Profile:  Recent Labs   Lab 03/17/24  0350 03/18/24  0346   WBC 7.18 5.62   HGB 8.5* 8.0*   HCT 27.4* 24.5*   * 94*   * 100*   RDW 14.0 13.2        Chemistries:  Recent Labs   Lab 03/17/24  0350 03/17/24  1400 03/17/24  2126 03/18/24  0346   * 130* 131* 134*   K 4.2 3.7 3.7 4.4    104 103 105   CO2 18* 18* 20* 21*   BUN 29* 26* 26* 26*   CREATININE 1.9* 1.5* 1.4 1.1   CALCIUM 8.2* 8.3* 8.3* 8.3*   ALBUMIN 3.0*  --   --  3.1*   PROT 5.3*  --   --  5.1*   BILITOT 0.2  --   --  0.4   ALKPHOS 55  --   --  55   ALT 6*  --   --  <5*   AST 34  --   --  23   MG 2.3  --   --  1.8   PHOS 4.4  --   --  2.5*       All pertinent labs within the past 24 hours have been reviewed.    Significant Imaging:  I have reviewed all pertinent imaging results/findings within the past 24 hours.  Assessment/Plan:     Cardiac/Vascular  * Nonrheumatic mitral valve regurgitation  Mora Adrian is a 68 y.o. female with a past medical history significant for anxiety, arthritis, GERD, hypertension, hyperlipidemia,  hypothyroidism and HFrEF. She reports she was preparing for a knee operation and attempted to get cardiac clearance. She underwent an evaluation which demonstrated severe mitral regurgitation, severely dilated left and right atrium, and an ejection fraction of 35-40%. She denied any symptoms including dyspnea on exertion, chest pain, palpations, or lower extremity edema.        Neuro/Psych:     - Sedation:  none    - Pain:   - Scheduled Tylenol 1g q8h    - Oxy PRN (10 x3, 5x1 last 24h)     - Home trazadone 100 mg nightly              Cardiac:   S/P Mitral Valve repair and Left Atrial Appendage Ligation with Dr. Bernal on 3/15/24    - BP Goal: MAP 60-80     - Cleviprex PRN    - Pressors: none    - Anti-HTNs: Will start when appropriate    - Rhythm: NSR, some PVCs/aflutter overnight     - Beta blocker: metop tart 25 BID    - Statin: ezetimibe 10 mg qhs     -  daily     - Lasix 40 IV BID       Pulmonary:     - Goal SpO2 >92%    - ABGs PRN      Renal:    - Trend BUN/Cr     - Voiding spontaneously. UOP 1.13 mL/kg/hr.     - Lasix 40 mg IV BID    Recent Labs   Lab 03/17/24  1400 03/17/24  2126 03/18/24  0346   BUN 26* 26* 26*   CREATININE 1.5* 1.4 1.1         FEN / GI:     - Daily CMP, PRN K/Mag/Phos per protocol     - Replace electrolytes as needed    - Nutrition: Cardiac diet    - Bowel Regimen: Miralax, docusate    - Home PPI       ID:     - Afebrile    - WBC stable    - Abx: Completed perioperative cefazolin 2g Q8H x 5 doses    Recent Labs   Lab 03/16/24  0340 03/17/24  0350 03/18/24  0346   WBC 10.02 7.18 5.62           Heme/Onc:     - Hgb 11.7 pre-operatively    - CBC daily    - ASA 325mg daily    Recent Labs   Lab 03/16/24  0340 03/17/24  0350 03/18/24  0346   HGB 9.7* 8.5* 8.0*    119* 94*   APTT <21.0 25.1 26.5   INR 1.0 1.0 1.0           Endocrine:     - CTS Goal -140    - Home synthroid.     - Endocrinology consulted for insulin management      PPx:   Feeding: Tolerating Cardiac diet    Analgesia/Sedation: Multimodal  Thromboembolic Prevention: SCD's and PT/OT  HOB >30: Yes  Stress Ulcer: Home Protonix  Glucose Control: BG WAL      Lines/Drains/Airway:   PIV    Left radial arterial line   Pacing Wires: Temporary ventricular pacing wires      Dispo/Code Status/Palliative:     - Continue ICU, likely step down later today if BPs okay     - Full Code         Isaac Macdonald MD  Critical Care - Surgery  [facility] - [facility]

## 2024-03-18 NOTE — NURSING TRANSFER
Nursing Transfer Note      3/18/2024   6:23 PM    Nurse giving handoff: ALEX Vicente   Nurse receiving handoff: Leyla Manjarrez RN    Reason patient is being transferred: lower acuity of care    Transfer From: SICU to CSU     Transfer via wheelchair    Transfer with O2, cardiac monitoring    Transported by ALEX Vicente     Transfer Vital Signs:  Blood Pressure:142/66  Heart Rate:82   O2: 99  Temperature:98.2  Respirations:14    Telemetry: Box Number [id number] and Telemetry  Himanshu, Rhythm NSR, Rate 82  Order for Tele Monitor? Yes    4eyes on Skin: yes    Medicines sent: mupirocin     Patient belongings transferred with patient: Yes    Chart send with patient: Yes    Notified: daughter    Patient reassessed at: 1700 (date, time)  1  Upon arrival to floor: cardiac monitor applied, patient oriented to room, call bell in reach, and bed in lowest position

## 2024-03-18 NOTE — PLAN OF CARE
SICU PLAN OF CARE NOTE    Dx: Nonrheumatic mitral valve regurgitation    Shift Events:  Issue with hypotension, MAPs in 50s, 500ml of Albumin given with good response and improvement of MAP, had concern for a fib, ecg gotten and reviewed by MD.    Gtts: KVO    Access: x2 PIVs, L radial a line    Neuro: AAO x4, Follows Commands, and Moves All Extremities    Cardiac: MAP 60-80, frequent PVCs, currently in SR, had some episodes of a fib/flutter without RVR rate 50s-70s, pacer wire isolated to chest not connected to pacer.     Respiratory: Nasal Cannula @ 2 L/min    GI: Cardiac Diet     : Voids Spontaneously via purwick 1250 cc/shift    Drains: none removed on 3/17 AM shift     Labs/Accuchecks: daily labs/ achs accuchecks    Skin: mid-sternal surgical incision c/d/i,     Last documentation =  Temp: 97.9 °F (36.6 °C) (03/18/24 0300)  Pulse: 91 (03/18/24 0730)  Resp: 14 (03/18/24 0730)  BP: 133/60 (03/18/24 0730)  SpO2: 98 % (03/18/24 0730)

## 2024-03-18 NOTE — PT/OT/SLP PROGRESS
Physical Therapy Treatment    Patient Name:  Mora Adrian   MRN:  [id number]  Admit Date: 3/15/2024  Admitting Diagnosis:  Nonrheumatic mitral valve regurgitation   Length of Stay: 3 days  Recent Surgery: Procedure(s) (LRB):  REPAIR, MITRAL VALVE, OPEN (N/A)  EXCLUSION, LEFT ATRIAL APPENDAGE, OPEN, AS PART OF OPEN CHEST SURGERY (N/A) 3 Days Post-Op    Recommendations:     Discharge Recommendations:  Low Intensity Therapy   Discharge Equipment Recommendations: none     Plan:     During this hospitalization, patient to be seen 5 x/week to address the listed problems via gait training, therapeutic activities, therapeutic exercises, neuromuscular re-education  Plan of Care Expires:  04/15/24  Plan of Care Reviewed with: patient, daughter    Assessment:     Mora Adrian is a 68 y.o. female admitted with a medical diagnosis of Nonrheumatic mitral valve regurgitation.  Pt was found alert and cooperative. Vitals were stable. RN gave consent to treat. Pt was able to increase gait distance and required decreased assistance for sit<>stand transfer. Pt Sp02 dropped to low 80's with activity, but increased with seated rest break and deep breaths. RN was notified. PT plans to continue to work on cardiopulmonary endurance and normal gait mechanics next session.     Problem List: impaired endurance, impaired functional mobility, gait instability, impaired cardiopulmonary response to activity, impaired balance.  Rehab Prognosis: Good     GOALS:   Multidisciplinary Problems       Physical Therapy Goals          Problem: Physical Therapy    Goal Priority Disciplines Outcome Goal Variances Interventions   Physical Therapy Goal     PT, PT/OT Ongoing, Progressing     Description: Goals to met by 3/30/2024    1. Sit to stand transfer with Stand-by Assistance  2. Bed to chair transfer with Stand-by Assistance using No Assistive Device  3. Gait  x 100 feet with Stand-by Assistance using No Assistive Device   4. Ascend/Descend 6 inch  "curb step with Contact Guard Assistance using No Assistive Device.  5. Stand for 5 minutes with Supervision using No Assistive Device  6. Lower extremity exercise program x15 reps per Instruction, with assistance as needed in order to facilitate improvement in functional independence                       Subjective   Communicated with RN prior to session.  Patient found HOB elevated upon PT entry to room, agreeable to evaluation. Mora Adrian's daughter present during session.    Chief Complaint: N/A  Patient/Family Comments/goals: Patient wishes to be discharged and returned to [facility].   Pain/Comfort:  Pain Rating 1: 0/10  Pain Rating Post-Intervention 1: 0/10    Objective:   Patient found with: peripheral IV, arterial line, oxygen, blood pressure cuff, pulse ox (continuous), telemetry   General Precautions: Standard, Cardiac fall, sternal   Orthopedic Precautions:N/A   Braces: N/A   Oxygen Device: Nasal Cannula   Vitals: BP (!) 108/52 (BP Location: Right arm, Patient Position: Sitting)   Pulse 78   Temp 98.6 °F (37 °C) (Oral)   Resp 17   Ht 5' 6" (1.676 m)   Wt 102.9 kg (226 lb 13.7 oz)   SpO2 99%   Breastfeeding No   BMI 36.62 kg/m²     Outcome Measures:  AM-PAC 6 CLICK MOBILITY  Turning over in bed (including adjusting bedclothes, sheets and blankets)?: 3  Sitting down on and standing up from a chair with arms (e.g., wheelchair, bedside commode, etc.): 3  Moving from lying on back to sitting on the side of the bed?: 3  Moving to and from a bed to a chair (including a wheelchair)?: 3  Need to walk in hospital room?: 3  Climbing 3-5 steps with a railing?: 2  Basic Mobility Total Score: 17       Functional Mobility:  Additional staff present: Student PT and Supervising PT  Bed Mobility:   Rolling/Turning to Right: stand by assistance  Supine to Sit: stand by assistance; HOB elevated  Scooting anteriorly to EOB to have both feet planted on floor: stand by assistance  Comments: Pt required verbal cueing " for bed mobility sequencing.     Sitting Balance at Edge of Bed:  Assistance Level Required: Stand-by Assistance  Time: 3 minutes   Postural deviations noted: rounded shoulders  Comments: Patient required verbal cueing to practice deep breathing. Patient at EOB to work on activity tolerance.     Transfers:   Sit <> Stand Transfer from EOB: contact guard assistance with no assistive device   Comments: Patient required verbal cueing for anterior WS and bending at the hips to stand.   Gait:  Patient ambulated: ~88 ft    Patient required: contact guard and minimal assist  Patient used: hand-held assist  Gait Pattern observed: reciprocal gait  Gait Deviation(s): unsteady gait, decreased step length, narrow base of support, decreased arm swing, and decreased laine  Impairments due to: impaired balance and decreased endurance  Comments: Portable monitor intact. RN present. Pt required verbal cueing to take normal purposeful steps and to swing arms reciprocally down by her side. Pt required verbal cueing to hold head upright in neutral position. She required cueing to take small steps when turning for safety. Pt reported some dizziness, SOB, and fatigue. Pt required increased assistance with increased fatigue. Pt took one seated rest break while taking deep breaths.       Therapeutic Activities, Exercises, and Education:   Pt was educated on PT role/POC.   Pt was educated on sternal precautions.     Gait  Pt ambulated increased distance to work on activity tolerance and cardiopulmonary endurance.     Therapeutic Activities:   Pt work on bed mobility, Sit<>stand transfers, and sitting balance to work on functional mobility.       Patient left up in chair with all lines intact, call button in reach, and RN notified..    Time Tracking:     PT Received On: 03/18/24  PT Start Time: 0849     PT Stop Time: 0913  PT Total Time (min): 24 min       Billable Minutes:   Gait Training 10 and Therapeutic Activity 14    Treatment Type:  Treatment  PT/PTA: PT

## 2024-03-18 NOTE — NURSING
Frequent PVCs noted, concern for possible a fib, Pt VSS, Dr. CRYSTAL Silvestre made aware, See orders.

## 2024-03-18 NOTE — CARE UPDATE
-Glucose Goal 110-140    -A1C:   Hemoglobin A1C   Date Value Ref Range Status   03/14/2024 5.0 4.0 - 5.6 % Final     Comment:     ADA Screening Guidelines:  5.7-6.4%  Consistent with prediabetes  >or=6.5%  Consistent with diabetes    High levels of fetal hemoglobin interfere with the HbA1C  assay. Heterozygous hemoglobin variants (HbS, HgC, etc)do  not significantly interfere with this assay.   However, presence of multiple variants may affect accuracy.           -HOME REGIMEN: no hx of DM per chart review     -GLUCOSE TREND FOR THE PAST 24HRS:   Recent Labs   Lab 03/17/24  0806 03/17/24  1059 03/17/24  1711 03/17/24  2131 03/18/24  0810 03/18/24  0811   POCTGLUCOSE 95 125* 127* 103 82 93         -NO HYPOGYCEMIAS NOTED     - Diet  Diet Cardiac Fluid - 1500mL; Standard Tray    -TOLERATING 50 % OF PO DIET       Plan:   Patient denies a history of DM. BG has been within goal without insulin requirements after discontinuing IIP post-CTS with diet advanced. Given this, endocrine will sign off at this time. Please do not hesitate to reach out for any BG related questions.

## 2024-03-18 NOTE — SUBJECTIVE & OBJECTIVE
Interval History/Significant Events:   Patient hypotension overnight 70s/40s. No changes in mental status or other symptoms. Given 500 mL albumin. Pressures improved. Still soft, MAPs low 60s while sleeping. Improve with awakening.     No complaints this morning. PWC. Voiding spontaneously. Passing flatus. No BM yet.     Follow-up For: Procedure(s) (LRB):  REPAIR, MITRAL VALVE, OPEN (N/A)  EXCLUSION, LEFT ATRIAL APPENDAGE, OPEN, AS PART OF OPEN CHEST SURGERY (N/A)    Post-Operative Day: 3 Days Post-Op    Objective:     Vital Signs (Most Recent):  Temp: 97.9 °F (36.6 °C) (03/18/24 0300)  Pulse: 78 (03/18/24 0600)  Resp: 10 (03/18/24 0600)  BP: (!) 116/53 (03/18/24 0545)  SpO2: 96 % (03/18/24 0600) Vital Signs (24h Range):  Temp:  [97.6 °F (36.4 °C)-98.9 °F (37.2 °C)] 97.9 °F (36.6 °C)  Pulse:  [59-95] 78  Resp:  [7-25] 10  SpO2:  [89 %-100 %] 96 %  BP: ()/(40-61) 116/53  Arterial Line BP: ()/(35-69) 91/43     Weight: 102.9 kg (226 lb 13.7 oz)  Body mass index is 36.62 kg/m².      Intake/Output Summary (Last 24 hours) at 3/18/2024 0608  Last data filed at 3/18/2024 0400  Gross per 24 hour   Intake 518.61 ml   Output 2780 ml   Net -2261.39 ml          Physical Exam  WA, NAD  NCAT, EOMI, NC in place  RRR per bedside monitor, CDI, wires in place (not currently being paced)  NWOB on 3L NC   S, NTTP  Aox3, moving all four, SILT  No pretibial edema   WWP  : deferred   Mood, affect normal     Vents:  Vent Mode: Spont (03/15/24 1606)  Set Rate: 16 BPM (03/15/24 1503)  Vt Set: 360 mL (03/15/24 1503)  Pressure Support: 5 cmH20 (03/15/24 1606)  PEEP/CPAP: 5 cmH20 (03/15/24 1606)  Oxygen Concentration (%): 32 (03/18/24 0337)  Peak Airway Pressure: 11 cmH20 (03/15/24 1606)  Plateau Pressure: 0 cmH20 (03/15/24 1606)  Total Ve: 11.1 L/m (03/15/24 1606)  Negative Inspiratory Force (cm H2O): 0 (03/15/24 1606)  F/VT Ratio<105 (RSBI): (!) 23.22 (03/15/24 1606)    Lines/Drains/Airways       Arterial Line  Duration              Arterial Line 03/15/24 0740 Left Radial 2 days              Line  Duration                  Pacer Wires 03/15/24 1151 2 days              Peripheral Intravenous Line  Duration                  Peripheral IV - Single Lumen 03/15/24 0603 20 G Left;Posterior Hand 3 days         Peripheral IV - Single Lumen 03/15/24 0805 16 G Right;Posterior Forearm 2 days                    Significant Labs:    CBC/Anemia Profile:  Recent Labs   Lab 03/17/24  0350 03/18/24  0346   WBC 7.18 5.62   HGB 8.5* 8.0*   HCT 27.4* 24.5*   * 94*   * 100*   RDW 14.0 13.2        Chemistries:  Recent Labs   Lab 03/17/24  0350 03/17/24  1400 03/17/24  2126 03/18/24  0346   * 130* 131* 134*   K 4.2 3.7 3.7 4.4    104 103 105   CO2 18* 18* 20* 21*   BUN 29* 26* 26* 26*   CREATININE 1.9* 1.5* 1.4 1.1   CALCIUM 8.2* 8.3* 8.3* 8.3*   ALBUMIN 3.0*  --   --  3.1*   PROT 5.3*  --   --  5.1*   BILITOT 0.2  --   --  0.4   ALKPHOS 55  --   --  55   ALT 6*  --   --  <5*   AST 34  --   --  23   MG 2.3  --   --  1.8   PHOS 4.4  --   --  2.5*       All pertinent labs within the past 24 hours have been reviewed.    Significant Imaging:  I have reviewed all pertinent imaging results/findings within the past 24 hours.

## 2024-03-19 PROBLEM — D62 ACUTE BLOOD LOSS ANEMIA: Status: ACTIVE | Noted: 2024-03-19

## 2024-03-19 PROBLEM — Z98.890 STATUS POST LIGATION OF LEFT ATRIAL APPENDAGE: Status: ACTIVE | Noted: 2024-03-19

## 2024-03-19 PROBLEM — E83.39 HYPOPHOSPHATEMIA: Status: ACTIVE | Noted: 2024-03-19

## 2024-03-19 PROBLEM — Z98.890 S/P MITRAL VALVE REPAIR: Status: ACTIVE | Noted: 2024-03-19

## 2024-03-19 LAB
ALBUMIN SERPL BCP-MCNC: 2.9 G/DL (ref 3.5–5.2)
ALP SERPL-CCNC: 57 U/L (ref 55–135)
ALT SERPL W/O P-5'-P-CCNC: <5 U/L (ref 10–44)
ANION GAP SERPL CALC-SCNC: 5 MMOL/L (ref 8–16)
APTT PPP: 26.2 SEC (ref 21–32)
ASCENDING AORTA: 2.45 CM
AST SERPL-CCNC: 16 U/L (ref 10–40)
AV INDEX (PROSTH): 0.53
AV MEAN GRADIENT: 3 MMHG
AV PEAK GRADIENT: 4 MMHG
AV VALVE AREA BY VELOCITY RATIO: 2.43 CM²
AV VALVE AREA: 1.79 CM²
AV VELOCITY RATIO: 0.72
BASOPHILS # BLD AUTO: 0.03 K/UL (ref 0–0.2)
BASOPHILS NFR BLD: 0.6 % (ref 0–1.9)
BILIRUB SERPL-MCNC: 0.4 MG/DL (ref 0.1–1)
BSA FOR ECHO PROCEDURE: 2.16 M2
BUN SERPL-MCNC: 20 MG/DL (ref 8–23)
CALCIUM SERPL-MCNC: 8.6 MG/DL (ref 8.7–10.5)
CHLORIDE SERPL-SCNC: 106 MMOL/L (ref 95–110)
CO2 SERPL-SCNC: 23 MMOL/L (ref 23–29)
CREAT SERPL-MCNC: 0.9 MG/DL (ref 0.5–1.4)
CV ECHO LV RWT: 0.24 CM
DIFFERENTIAL METHOD BLD: ABNORMAL
DOP CALC AO PEAK VEL: 1.05 M/S
DOP CALC AO VTI: 19.44 CM
DOP CALC LVOT AREA: 3.4 CM2
DOP CALC LVOT DIAMETER: 2.07 CM
DOP CALC LVOT PEAK VEL: 0.76 M/S
DOP CALC LVOT STROKE VOLUME: 34.88 CM3
DOP CALCLVOT PEAK VEL VTI: 10.37 CM
E WAVE DECELERATION TIME: 208.86 MSEC
E/A RATIO: 1.06
E/E' RATIO: 12.67 M/S
ECHO LV POSTERIOR WALL: 0.65 CM (ref 0.6–1.1)
EOSINOPHIL # BLD AUTO: 0.2 K/UL (ref 0–0.5)
EOSINOPHIL NFR BLD: 3.5 % (ref 0–8)
ERYTHROCYTE [DISTWIDTH] IN BLOOD BY AUTOMATED COUNT: 13.4 % (ref 11.5–14.5)
EST. GFR  (NO RACE VARIABLE): >60 ML/MIN/1.73 M^2
FRACTIONAL SHORTENING: 15 % (ref 28–44)
GLUCOSE SERPL-MCNC: 91 MG/DL (ref 70–110)
HCT VFR BLD AUTO: 26.4 % (ref 37–48.5)
HGB BLD-MCNC: 8.3 G/DL (ref 12–16)
IMM GRANULOCYTES # BLD AUTO: 0.01 K/UL (ref 0–0.04)
IMM GRANULOCYTES NFR BLD AUTO: 0.2 % (ref 0–0.5)
INR PPP: 1 (ref 0.8–1.2)
INTERVENTRICULAR SEPTUM: 0.58 CM (ref 0.6–1.1)
IVRT: 62.8 MSEC
LA MAJOR: 5.24 CM
LA MINOR: 5.24 CM
LA WIDTH: 4.35 CM
LEFT ATRIUM SIZE: 3.54 CM
LEFT ATRIUM VOLUME INDEX MOD: 25.9 ML/M2
LEFT ATRIUM VOLUME INDEX: 32.5 ML/M2
LEFT ATRIUM VOLUME MOD: 54.65 CM3
LEFT ATRIUM VOLUME: 68.59 CM3
LEFT INTERNAL DIMENSION IN SYSTOLE: 4.6 CM (ref 2.1–4)
LEFT VENTRICLE DIASTOLIC VOLUME INDEX: 66.93 ML/M2
LEFT VENTRICLE DIASTOLIC VOLUME: 141.23 ML
LEFT VENTRICLE MASS INDEX: 53 G/M2
LEFT VENTRICLE SYSTOLIC VOLUME INDEX: 46.1 ML/M2
LEFT VENTRICLE SYSTOLIC VOLUME: 97.25 ML
LEFT VENTRICULAR INTERNAL DIMENSION IN DIASTOLE: 5.4 CM (ref 3.5–6)
LEFT VENTRICULAR MASS: 112.06 G
LV LATERAL E/E' RATIO: 9.5 M/S
LV SEPTAL E/E' RATIO: 19 M/S
LYMPHOCYTES # BLD AUTO: 1.3 K/UL (ref 1–4.8)
LYMPHOCYTES NFR BLD: 27 % (ref 18–48)
MAGNESIUM SERPL-MCNC: 1.9 MG/DL (ref 1.6–2.6)
MCH RBC QN AUTO: 32.4 PG (ref 27–31)
MCHC RBC AUTO-ENTMCNC: 31.4 G/DL (ref 32–36)
MCV RBC AUTO: 103 FL (ref 82–98)
MONOCYTES # BLD AUTO: 0.6 K/UL (ref 0.3–1)
MONOCYTES NFR BLD: 11.4 % (ref 4–15)
MV A" WAVE DURATION": 7.71 MSEC
MV PEAK A VEL: 0.72 M/S
MV PEAK E VEL: 0.76 M/S
MV STENOSIS PRESSURE HALF TIME: 60.57 MS
MV VALVE AREA P 1/2 METHOD: 3.63 CM2
NEUTROPHILS # BLD AUTO: 2.8 K/UL (ref 1.8–7.7)
NEUTROPHILS NFR BLD: 57.3 % (ref 38–73)
NRBC BLD-RTO: 0 /100 WBC
PHOSPHATE SERPL-MCNC: 2.2 MG/DL (ref 2.7–4.5)
PISA TR MAX VEL: 2.54 M/S
PLATELET # BLD AUTO: 138 K/UL (ref 150–450)
PMV BLD AUTO: 10.4 FL (ref 9.2–12.9)
POTASSIUM SERPL-SCNC: 4.4 MMOL/L (ref 3.5–5.1)
PROT SERPL-MCNC: 5.1 G/DL (ref 6–8.4)
PROTHROMBIN TIME: 10.4 SEC (ref 9–12.5)
PULM VEIN S/D RATIO: 0.5
PV PEAK D VEL: 0.36 M/S
PV PEAK S VEL: 0.18 M/S
RA MAJOR: 5.7 CM
RA PRESSURE ESTIMATED: 3 MMHG
RA WIDTH: 3.47 CM
RBC # BLD AUTO: 2.56 M/UL (ref 4–5.4)
RIGHT VENTRICULAR END-DIASTOLIC DIMENSION: 2.75 CM
RV TB RVSP: 6 MMHG
SINUS: 2.74 CM
SODIUM SERPL-SCNC: 134 MMOL/L (ref 136–145)
STJ: 2.5 CM
TDI LATERAL: 0.08 M/S
TDI SEPTAL: 0.04 M/S
TDI: 0.06 M/S
TR MAX PG: 26 MMHG
TRICUSPID ANNULAR PLANE SYSTOLIC EXCURSION: 0.84 CM
TV REST PULMONARY ARTERY PRESSURE: 29 MMHG
WBC # BLD AUTO: 4.81 K/UL (ref 3.9–12.7)
Z-SCORE OF LEFT VENTRICULAR DIMENSION IN END DIASTOLE: -2.03
Z-SCORE OF LEFT VENTRICULAR DIMENSION IN END SYSTOLE: 1.03

## 2024-03-19 PROCEDURE — 97116 GAIT TRAINING THERAPY: CPT

## 2024-03-19 PROCEDURE — 85025 COMPLETE CBC W/AUTO DIFF WBC: CPT | Performed by: STUDENT IN AN ORGANIZED HEALTH CARE EDUCATION/TRAINING PROGRAM

## 2024-03-19 PROCEDURE — 20600001 HC STEP DOWN PRIVATE ROOM

## 2024-03-19 PROCEDURE — 25000003 PHARM REV CODE 250

## 2024-03-19 PROCEDURE — 25000003 PHARM REV CODE 250: Performed by: STUDENT IN AN ORGANIZED HEALTH CARE EDUCATION/TRAINING PROGRAM

## 2024-03-19 PROCEDURE — 85730 THROMBOPLASTIN TIME PARTIAL: CPT | Performed by: STUDENT IN AN ORGANIZED HEALTH CARE EDUCATION/TRAINING PROGRAM

## 2024-03-19 PROCEDURE — 83735 ASSAY OF MAGNESIUM: CPT | Performed by: STUDENT IN AN ORGANIZED HEALTH CARE EDUCATION/TRAINING PROGRAM

## 2024-03-19 PROCEDURE — 94761 N-INVAS EAR/PLS OXIMETRY MLT: CPT

## 2024-03-19 PROCEDURE — 85610 PROTHROMBIN TIME: CPT | Performed by: STUDENT IN AN ORGANIZED HEALTH CARE EDUCATION/TRAINING PROGRAM

## 2024-03-19 PROCEDURE — 25000003 PHARM REV CODE 250: Performed by: PHYSICIAN ASSISTANT

## 2024-03-19 PROCEDURE — 25000003 PHARM REV CODE 250: Performed by: ANESTHESIOLOGY

## 2024-03-19 PROCEDURE — 80053 COMPREHEN METABOLIC PANEL: CPT | Performed by: STUDENT IN AN ORGANIZED HEALTH CARE EDUCATION/TRAINING PROGRAM

## 2024-03-19 PROCEDURE — 99900035 HC TECH TIME PER 15 MIN (STAT)

## 2024-03-19 PROCEDURE — 84100 ASSAY OF PHOSPHORUS: CPT | Performed by: STUDENT IN AN ORGANIZED HEALTH CARE EDUCATION/TRAINING PROGRAM

## 2024-03-19 PROCEDURE — 36415 COLL VENOUS BLD VENIPUNCTURE: CPT | Performed by: STUDENT IN AN ORGANIZED HEALTH CARE EDUCATION/TRAINING PROGRAM

## 2024-03-19 RX ORDER — FUROSEMIDE 20 MG/1
20 TABLET ORAL 2 TIMES DAILY
Status: DISCONTINUED | OUTPATIENT
Start: 2024-03-19 | End: 2024-03-20

## 2024-03-19 RX ADMIN — FUROSEMIDE 20 MG: 20 TABLET ORAL at 09:03

## 2024-03-19 RX ADMIN — OXYCODONE HYDROCHLORIDE 10 MG: 10 TABLET ORAL at 08:03

## 2024-03-19 RX ADMIN — MUPIROCIN 1 G: 20 OINTMENT TOPICAL at 08:03

## 2024-03-19 RX ADMIN — DOCUSATE SODIUM 100 MG: 100 CAPSULE, LIQUID FILLED ORAL at 08:03

## 2024-03-19 RX ADMIN — DOCUSATE SODIUM 100 MG: 100 CAPSULE, LIQUID FILLED ORAL at 09:03

## 2024-03-19 RX ADMIN — EZETIMIBE 10 MG: 10 TABLET ORAL at 08:03

## 2024-03-19 RX ADMIN — ACETAMINOPHEN 1000 MG: 500 TABLET ORAL at 05:03

## 2024-03-19 RX ADMIN — OXYCODONE 5 MG: 5 TABLET ORAL at 05:03

## 2024-03-19 RX ADMIN — METOPROLOL TARTRATE 25 MG: 25 TABLET, FILM COATED ORAL at 08:03

## 2024-03-19 RX ADMIN — TRAZODONE HYDROCHLORIDE 100 MG: 100 TABLET ORAL at 08:03

## 2024-03-19 RX ADMIN — DIBASIC SODIUM PHOSPHATE, MONOBASIC POTASSIUM PHOSPHATE AND MONOBASIC SODIUM PHOSPHATE 2 TABLET: 852; 155; 130 TABLET ORAL at 09:03

## 2024-03-19 RX ADMIN — LEVOTHYROXINE SODIUM 150 MCG: 150 TABLET ORAL at 05:03

## 2024-03-19 RX ADMIN — ASPIRIN 325 MG: 325 TABLET, COATED ORAL at 09:03

## 2024-03-19 RX ADMIN — OXYCODONE 5 MG: 5 TABLET ORAL at 10:03

## 2024-03-19 RX ADMIN — METOPROLOL TARTRATE 25 MG: 25 TABLET, FILM COATED ORAL at 09:03

## 2024-03-19 RX ADMIN — MUPIROCIN 1 G: 20 OINTMENT TOPICAL at 09:03

## 2024-03-19 RX ADMIN — ACETAMINOPHEN 1000 MG: 500 TABLET ORAL at 09:03

## 2024-03-19 RX ADMIN — OXYCODONE 5 MG: 5 TABLET ORAL at 02:03

## 2024-03-19 RX ADMIN — FUROSEMIDE 20 MG: 20 TABLET ORAL at 06:03

## 2024-03-19 RX ADMIN — PANTOPRAZOLE SODIUM 40 MG: 40 TABLET, DELAYED RELEASE ORAL at 09:03

## 2024-03-19 RX ADMIN — POLYETHYLENE GLYCOL 3350 17 G: 17 POWDER, FOR SOLUTION ORAL at 09:03

## 2024-03-19 NOTE — PT/OT/SLP PROGRESS
Physical Therapy Treatment    Patient Name:  Mora Adrian   MRN:  [id number]  Admitting Diagnosis:  Nonrheumatic mitral valve regurgitation   Recent Surgery: Procedure(s) (LRB):  REPAIR, MITRAL VALVE, OPEN (N/A)  EXCLUSION, LEFT ATRIAL APPENDAGE, OPEN, AS PART OF OPEN CHEST SURGERY (N/A) 4 Days Post-Op  Admit Date: 3/15/2024  Length of Stay: 4 days    Recommendations:     Discharge Recommendations:    Low Intensity Therapy   Discharge Equipment Recommendations: none   Barriers to discharge: None    Appropriate transfer level with nursing staff: Ambulatory with [facility]    Plan:     During this hospitalization, patient to be seen 5 x/week to address the identified rehab impairments via gait training, therapeutic activities, therapeutic exercises, neuromuscular re-education and progress towards the established goals.  Plan of Care Expires:  04/15/24  Plan of Care Reviewed with: patient, daughter    Assessment:     Mora Adrian is a 68 y.o. female admitted with a medical diagnosis of Nonrheumatic mitral valve regurgitation. Pt found in chair agreeable and motivated for therapy session. Pt demo'd improvements this session as she increased distance gait trained and required decreased level of assistance, but pt with SOB after trial and oxygen saturation decrease to 79% on 2 L NC after gait trial. Oxygen titrated to 3 L NC and pt with SpO2 95% after a few minutes of deep breathing. Patient able to recall 3/3 sternal precautions and required no cueing for compliance during session.  Patient remains limited by decreased endurance and impaired cardiopulmonary response to activity. Patient would benefit from skilled therapy services to maximize safety and independence, increase activity tolerance, decrease fall risk, decrease caregiver burden, improve QOL, improve patient's functional mobility, and decrease risk of contractures and pressure sores.  Patient continues to demonstrate the need for low intensity therapy on a  scheduled basis exhibited by decreased independence with functional mobility    Problem List: weakness, impaired endurance, impaired self care skills, impaired functional mobility, gait instability, impaired balance, decreased upper extremity function, decreased lower extremity function, pain, impaired cardiopulmonary response to activity.  Rehab Prognosis: Good; patient would benefit from acute skilled PT services to address these deficits and reach maximum level of function.      Goals:   Multidisciplinary Problems       Physical Therapy Goals          Problem: Physical Therapy    Goal Priority Disciplines Outcome Goal Variances Interventions   Physical Therapy Goal     PT, PT/OT Ongoing, Progressing     Description: Goals to met by 3/30/2024    1. Sit to stand transfer with Stand-by Assistance  2. Bed to chair transfer with Stand-by Assistance using No Assistive Device  3. Gait  x 100 feet with Stand-by Assistance using No Assistive Device   4. Ascend/Descend 6 inch curb step with Contact Guard Assistance using No Assistive Device.  5. Stand for 5 minutes with Supervision using No Assistive Device  6. Lower extremity exercise program x15 reps per Instruction, with assistance as needed in order to facilitate improvement in functional independence                       Subjective     RN notified prior to session. Daughter present upon PT entrance into room. Patient agreeable to PT treatment session.    Chief Complaint: SOB  Patient/Family Comments/goals: go home  Pain/Comfort:  Pain Rating 1: 4/10  Location - Orientation 1: generalized  Location 1: sternal (near incision site\)  Pain Addressed 1: Reposition, Distraction      Objective:     Patient found up in chair with: telemetry, PureWick, oxygen   Cognition:   Alert and Cooperative  Patient is oriented to Person, Place, Time, Situation  General Precautions: Standard, Cardiac fall, sternal   Orthopedic Precautions:N/A   Braces: N/A   Body mass index is 36.72  "kg/m².  Oxygen Device: Nasal Cannula 2L  Vitals: /66   Pulse 86   Temp 97.7 °F (36.5 °C) (Oral)   Resp 17   Ht 5' 6" (1.676 m)   Wt 103.2 kg (227 lb 8.2 oz)   SpO2 (!) 94%   Breastfeeding No   BMI 36.72 kg/m²     Outcome Measures:  AM-PAC 6 CLICK MOBILITY  Turning over in bed (including adjusting bedclothes, sheets and blankets)?: 3  Sitting down on and standing up from a chair with arms (e.g., wheelchair, bedside commode, etc.): 3  Moving from lying on back to sitting on the side of the bed?: 3  Moving to and from a bed to a chair (including a wheelchair)?: 3  Need to walk in hospital room?: 3  Climbing 3-5 steps with a railing?: 2  Basic Mobility Total Score: 17     Functional Mobility:    Bed Mobility:   Pt found/returned to bedside chair    Transfers:   Sit <> Stand Transfer: moderate assistance with no assistive device from chair     Balance:  Standing:  Static: contact guard assistance  Dynamic: contact guard assistance      Gait:  Patient ambulated: 150' with 1 standing rest break   Patient required: contact guard  Patient used:  no assistive device   Gait Deviation(s): occasional unsteady gait, decreased step length, narrow base of support, decreased laine, and decreased arm swing  all lines remained intact throughout ambulation trial  Chair follow for patient safety  Gait belt utilized  Comments: Patient required cues to increase arm swing and step length during trial as pt slow and stiff throughout but no LOB. Pt with SOB once seated in chair after trial and oxygen saturation decrease to 79% on 2 L NC after gait trial. Oxygen titrated to 3 L NC and pt with SpO2 95% after a few minutes of deep breathing    Education:  Time provided for education, counseling and discussion of health disposition in regards to patient's current status  All questions answered within PT scope of practice and to patient's satisfaction  PT role in POC to address current functional deficits  Pt educated on proper " body mechanics, safety techniques, and energy conservation with PT facilitation and cueing throughout session  Call nursing/pct to transfer to chair/use bathroom. Pt stated understanding.  Ambulate 3x/day with therapy/nsg to increase functional mobility    Patient left up in chair with all lines intact, call button in reach, RN notified, and daughter present.    Time Tracking:     PT Received On: 03/19/24  PT Start Time: 1301     PT Stop Time: 1325  PT Total Time (min): 24 min     Billable Minutes:   Gait Training 24 minutes    Treatment Type: Treatment  PT/PTA: PT       3/19/2024

## 2024-03-19 NOTE — PLAN OF CARE
Problem: Adult Inpatient Plan of Care  Goal: Plan of Care Review  Outcome: Ongoing, Progressing  Goal: Patient-Specific Goal (Individualized)  Outcome: Ongoing, Progressing  Goal: Absence of Hospital-Acquired Illness or Injury  Outcome: Ongoing, Progressing  Goal: Optimal Comfort and Wellbeing  Outcome: Ongoing, Progressing  Goal: Readiness for Transition of Care  Outcome: Ongoing, Progressing     Problem: Activity Intolerance (Cardiovascular Surgery)  Goal: Improved Activity Tolerance  Outcome: Ongoing, Progressing     Problem: Adjustment to Surgery (Cardiovascular Surgery)  Goal: Optimal Coping with Heart Surgery  Outcome: Ongoing, Progressing

## 2024-03-19 NOTE — SUBJECTIVE & OBJECTIVE
Interval History: NAEON. Stepped down uneventfully. Will trial PO lasix today and see how blood pressure responds. Wean oxygen as tolerated. Needs to ambulate. Pre discharge echo ordered.     Review of Systems   Constitutional: Negative for malaise/fatigue.   Cardiovascular:  Positive for dyspnea on exertion. Negative for chest pain.   Neurological:  Positive for weakness.     Medications:  Continuous Infusions:  Scheduled Meds:   acetaminophen  1,000 mg Oral Q8H    aspirin  325 mg Oral Daily    docusate sodium  100 mg Oral BID    ezetimibe  10 mg Oral QHS    furosemide  20 mg Oral BID    levothyroxine  150 mcg Oral Before breakfast    metoprolol tartrate  25 mg Oral BID    mupirocin  1 g Nasal BID    pantoprazole  40 mg Oral Daily    polyethylene glycol  17 g Oral Daily    traZODone  100 mg Oral QHS     PRN Meds:bisacodyL, dextrose 10%, dextrose 10%, ondansetron, oxyCODONE, oxyCODONE, prochlorperazine, sodium chloride 0.9%     Objective:     Vital Signs (Most Recent):  Temp: 97.4 °F (36.3 °C) (03/19/24 0824)  Pulse: 77 (03/19/24 0957)  Resp: 18 (03/19/24 0502)  BP: 130/82 (03/19/24 0824)  SpO2: 95 % (03/19/24 0957) Vital Signs (24h Range):  Temp:  [97.4 °F (36.3 °C)-98.6 °F (37 °C)] 97.4 °F (36.3 °C)  Pulse:  [67-97] 77  Resp:  [8-19] 18  SpO2:  [92 %-100 %] 95 %  BP: ()/(52-82) 130/82  Arterial Line BP: ()/(38-78) 150/60     Weight: 103.2 kg (227 lb 8.2 oz)  Body mass index is 36.72 kg/m².    SpO2: 95 %       Intake/Output - Last 3 Shifts         03/17 0700  03/18 0659 03/18 0700 03/19 0659 03/19 0700 03/20 0659    P.O.  240     I.V. (mL/kg) 518.6 (5) 49.5 (0.5)     IV Piggyback  248.9     Total Intake(mL/kg) 518.6 (5) 538.4 (5.2)     Urine (mL/kg/hr) 3230 (1.3) 1550 (0.6)     Stool 0      Chest Tube       Total Output 3230 1550     Net -2711.4 -1011.6            Stool Occurrence 0 x              Lines/Drains/Airways       Drain  Duration             Female External Urinary Catheter w/ Suction  "03/17/24 2 days              Line  Duration                  Pacer Wires 03/15/24 1151 3 days              Peripheral Intravenous Line  Duration                  Peripheral IV - Single Lumen 03/15/24 0603 20 G Left;Posterior Hand 4 days         Peripheral IV - Single Lumen 03/15/24 0805 16 G Right;Posterior Forearm 4 days                     Physical Exam  Vitals reviewed.   Constitutional:       General: She is not in acute distress.     Appearance: She is well-developed. She is not diaphoretic.   HENT:      Head: Normocephalic and atraumatic.   Neck:      Vascular: No JVD.   Cardiovascular:      Rate and Rhythm: Normal rate and regular rhythm.      Comments: Midline sternal incision c/d/I  Pacer wires secured   Pulmonary:      Effort: Pulmonary effort is normal. No respiratory distress.      Comments: NC  Abdominal:      General: There is no distension.   Musculoskeletal:         General: Normal range of motion.      Cervical back: Normal range of motion.      Right lower leg: Edema present.      Left lower leg: Edema present.   Skin:     Coloration: Skin is not pale.      Findings: Bruising present.   Neurological:      Mental Status: She is alert. Mental status is at baseline.   Psychiatric:         Speech: Speech normal.         Behavior: Behavior normal.         Thought Content: Thought content normal.         Judgment: Judgment normal.            Significant Labs:  BMP:   Recent Labs   Lab 03/19/24  0457   GLU 91   *   K 4.4      CO2 23   BUN 20   CREATININE 0.9   CALCIUM 8.6*   MG 1.9     Cardiac markers: No results for input(s): "CKMB", "CPKMB", "TROPONINT", "TROPONINI", "MYOGLOBIN" in the last 48 hours.  CBC:   Recent Labs   Lab 03/19/24 0457   WBC 4.81   RBC 2.56*   HGB 8.3*   HCT 26.4*   *   *   MCH 32.4*   MCHC 31.4*     CMP:   Recent Labs   Lab 03/19/24 0457   GLU 91   CALCIUM 8.6*   ALBUMIN 2.9*   PROT 5.1*   *   K 4.4   CO2 23      BUN 20   CREATININE 0.9 "   ALKPHOS 57   ALT <5*   AST 16   BILITOT 0.4     Coagulation:   Recent Labs   Lab 03/19/24  0457   INR 1.0   APTT 26.2       Significant Diagnostics:  I have reviewed all pertinent imaging results/findings within the past 24 hours.

## 2024-03-19 NOTE — ASSESSMENT & PLAN NOTE
Patient with baseline anemia compounded with expected post operative blood loss   CBC daily   (H/H 11.7/36 at pre-op visit)

## 2024-03-19 NOTE — PROGRESS NOTES
[facility] - Cardiology Stepdown  Cardiothoracic Surgery  Progress Note    Patient Name: Mora Adrian  MRN: [id number]  Admission Date: 3/15/2024  Hospital Length of Stay: 4 days  Code Status: Full Code   Attending Physician: Pedrito Bernal MD   Referring Provider: Pedrito Bernal MD  Principal Problem:Nonrheumatic mitral valve regurgitation    Subjective:     Post-Op Info:  Procedure(s) (LRB):  REPAIR, MITRAL VALVE, OPEN (N/A)  EXCLUSION, LEFT ATRIAL APPENDAGE, OPEN, AS PART OF OPEN CHEST SURGERY (N/A)   4 Days Post-Op     Interval History: NAEON. Stepped down uneventfully. Will trial PO lasix today and see how blood pressure responds. Wean oxygen as tolerated. Needs to ambulate. Pre discharge echo ordered.     Review of Systems   Constitutional: Negative for malaise/fatigue.   Cardiovascular:  Positive for dyspnea on exertion. Negative for chest pain.   Neurological:  Positive for weakness.     Medications:  Continuous Infusions:  Scheduled Meds:   acetaminophen  1,000 mg Oral Q8H    aspirin  325 mg Oral Daily    docusate sodium  100 mg Oral BID    ezetimibe  10 mg Oral QHS    furosemide  20 mg Oral BID    levothyroxine  150 mcg Oral Before breakfast    metoprolol tartrate  25 mg Oral BID    mupirocin  1 g Nasal BID    pantoprazole  40 mg Oral Daily    polyethylene glycol  17 g Oral Daily    traZODone  100 mg Oral QHS     PRN Meds:bisacodyL, dextrose 10%, dextrose 10%, ondansetron, oxyCODONE, oxyCODONE, prochlorperazine, sodium chloride 0.9%     Objective:     Vital Signs (Most Recent):  Temp: 97.4 °F (36.3 °C) (03/19/24 0824)  Pulse: 77 (03/19/24 0957)  Resp: 18 (03/19/24 0502)  BP: 130/82 (03/19/24 0824)  SpO2: 95 % (03/19/24 0957) Vital Signs (24h Range):  Temp:  [97.4 °F (36.3 °C)-98.6 °F (37 °C)] 97.4 °F (36.3 °C)  Pulse:  [67-97] 77  Resp:  [8-19] 18  SpO2:  [92 %-100 %] 95 %  BP: ()/(52-82) 130/82  Arterial Line BP: ()/(38-78) 150/60     Weight: 103.2 kg (227 lb 8.2 oz)  Body mass  index is 36.72 kg/m².    SpO2: 95 %       Intake/Output - Last 3 Shifts         03/17 0700  03/18 0659 03/18 0700  03/19 0659 03/19 0700  03/20 0659    P.O.  240     I.V. (mL/kg) 518.6 (5) 49.5 (0.5)     IV Piggyback  248.9     Total Intake(mL/kg) 518.6 (5) 538.4 (5.2)     Urine (mL/kg/hr) 3230 (1.3) 1550 (0.6)     Stool 0      Chest Tube       Total Output 3230 1550     Net -2711.4 -1011.6            Stool Occurrence 0 x              Lines/Drains/Airways       Drain  Duration             Female External Urinary Catheter w/ Suction 03/17/24 2 days              Line  Duration                  Pacer Wires 03/15/24 1151 3 days              Peripheral Intravenous Line  Duration                  Peripheral IV - Single Lumen 03/15/24 0603 20 G Left;Posterior Hand 4 days         Peripheral IV - Single Lumen 03/15/24 0805 16 G Right;Posterior Forearm 4 days                     Physical Exam  Vitals reviewed.   Constitutional:       General: She is not in acute distress.     Appearance: She is well-developed. She is not diaphoretic.   HENT:      Head: Normocephalic and atraumatic.   Neck:      Vascular: No JVD.   Cardiovascular:      Rate and Rhythm: Normal rate and regular rhythm.      Comments: Midline sternal incision c/d/I  Pacer wires secured   Pulmonary:      Effort: Pulmonary effort is normal. No respiratory distress.      Comments: NC  Abdominal:      General: There is no distension.   Musculoskeletal:         General: Normal range of motion.      Cervical back: Normal range of motion.      Right lower leg: Edema present.      Left lower leg: Edema present.   Skin:     Coloration: Skin is not pale.      Findings: Bruising present.   Neurological:      Mental Status: She is alert. Mental status is at baseline.   Psychiatric:         Speech: Speech normal.         Behavior: Behavior normal.         Thought Content: Thought content normal.         Judgment: Judgment normal.            Significant Labs:  BMP:   Recent  "Labs   Lab 03/19/24  0457   GLU 91   *   K 4.4      CO2 23   BUN 20   CREATININE 0.9   CALCIUM 8.6*   MG 1.9     Cardiac markers: No results for input(s): "CKMB", "CPKMB", "TROPONINT", "TROPONINI", "MYOGLOBIN" in the last 48 hours.  CBC:   Recent Labs   Lab 03/19/24  0457   WBC 4.81   RBC 2.56*   HGB 8.3*   HCT 26.4*   *   *   MCH 32.4*   MCHC 31.4*     CMP:   Recent Labs   Lab 03/19/24  0457   GLU 91   CALCIUM 8.6*   ALBUMIN 2.9*   PROT 5.1*   *   K 4.4   CO2 23      BUN 20   CREATININE 0.9   ALKPHOS 57   ALT <5*   AST 16   BILITOT 0.4     Coagulation:   Recent Labs   Lab 03/19/24  0457   INR 1.0   APTT 26.2       Significant Diagnostics:  I have reviewed all pertinent imaging results/findings within the past 24 hours.  Assessment/Plan:     Hypophosphatemia  Replaced PO   Daily labs     Acute blood loss anemia  Patient with baseline anemia compounded with expected post operative blood loss   CBC daily   (H/H 11.7/36 at pre-op visit)     Status post ligation of left atrial appendage  NSR with some PVCs on monitor   BB     S/P mitral valve repair  ASA  BB   Low dose lasix trial today   Potassium replacements   PT/OT   Bowel regimen   Pain regimen   Cardiac diet with 1500mL fluid restriction   Chest tubes out   Pacer wires secured   Wean oxygen as tolerated   TTE ordered     Transient hyperglycemia post procedure  Endocrine following     Hypothyroidism  Home synthroid     HLD (hyperlipidemia)  Home Zetia in place of statin     HTN (hypertension)  Hypotension when in ICU. Received fluids and pressure stable       GERD (gastroesophageal reflux disease)  Protonix     Heart failure with reduced ejection fraction  Wean oxygen as tolerated   Continue Diuresis       Dispo: CSU. D/C when medically stable     Kirti Stone PA-C  Cardiothoracic Surgery  [facility] - Card[facility] Step[facility]  "

## 2024-03-19 NOTE — ASSESSMENT & PLAN NOTE
ASA  BB   Low dose lasix trial today   Potassium replacements   PT/OT   Bowel regimen   Pain regimen   Cardiac diet with 1500mL fluid restriction   Chest tubes out   Pacer wires secured   Wean oxygen as tolerated   TTE ordered

## 2024-03-20 PROBLEM — E66.9 OBESITY (BMI 30-39.9): Status: ACTIVE | Noted: 2024-03-20

## 2024-03-20 PROBLEM — E87.1 HYPONATREMIA: Status: ACTIVE | Noted: 2024-03-20

## 2024-03-20 LAB
ANION GAP SERPL CALC-SCNC: 9 MMOL/L (ref 8–16)
ANISOCYTOSIS BLD QL SMEAR: SLIGHT
BASOPHILS # BLD AUTO: 0.05 K/UL (ref 0–0.2)
BASOPHILS NFR BLD: 0.9 % (ref 0–1.9)
BUN SERPL-MCNC: 16 MG/DL (ref 8–23)
CALCIUM SERPL-MCNC: 9.7 MG/DL (ref 8.7–10.5)
CHLORIDE SERPL-SCNC: 98 MMOL/L (ref 95–110)
CO2 SERPL-SCNC: 28 MMOL/L (ref 23–29)
CREAT SERPL-MCNC: 0.9 MG/DL (ref 0.5–1.4)
DIFFERENTIAL METHOD BLD: ABNORMAL
EOSINOPHIL # BLD AUTO: 0.2 K/UL (ref 0–0.5)
EOSINOPHIL NFR BLD: 3.1 % (ref 0–8)
ERYTHROCYTE [DISTWIDTH] IN BLOOD BY AUTOMATED COUNT: 13.2 % (ref 11.5–14.5)
EST. GFR  (NO RACE VARIABLE): >60 ML/MIN/1.73 M^2
FINAL PATHOLOGIC DIAGNOSIS: NORMAL
GLUCOSE SERPL-MCNC: 122 MG/DL (ref 70–110)
GROSS: NORMAL
HCT VFR BLD AUTO: 27.3 % (ref 37–48.5)
HGB BLD-MCNC: 9.4 G/DL (ref 12–16)
HYPOCHROMIA BLD QL SMEAR: ABNORMAL
IMM GRANULOCYTES # BLD AUTO: 0.13 K/UL (ref 0–0.04)
IMM GRANULOCYTES NFR BLD AUTO: 2.3 % (ref 0–0.5)
LYMPHOCYTES # BLD AUTO: 1.9 K/UL (ref 1–4.8)
LYMPHOCYTES NFR BLD: 33.4 % (ref 18–48)
Lab: NORMAL
MAGNESIUM SERPL-MCNC: 1.6 MG/DL (ref 1.6–2.6)
MCH RBC QN AUTO: 33.3 PG (ref 27–31)
MCHC RBC AUTO-ENTMCNC: 34.4 G/DL (ref 32–36)
MCV RBC AUTO: 97 FL (ref 82–98)
MONOCYTES # BLD AUTO: 0.8 K/UL (ref 0.3–1)
MONOCYTES NFR BLD: 14.9 % (ref 4–15)
NEUTROPHILS # BLD AUTO: 2.5 K/UL (ref 1.8–7.7)
NEUTROPHILS NFR BLD: 45.4 % (ref 38–73)
NRBC BLD-RTO: 0 /100 WBC
OHS QRS DURATION: 90 MS
OHS QTC CALCULATION: 467 MS
OVALOCYTES BLD QL SMEAR: ABNORMAL
PHOSPHATE SERPL-MCNC: 2.8 MG/DL (ref 2.7–4.5)
PLATELET # BLD AUTO: 142 K/UL (ref 150–450)
PMV BLD AUTO: ABNORMAL FL (ref 9.2–12.9)
POIKILOCYTOSIS BLD QL SMEAR: SLIGHT
POLYCHROMASIA BLD QL SMEAR: ABNORMAL
POTASSIUM SERPL-SCNC: 4.3 MMOL/L (ref 3.5–5.1)
RBC # BLD AUTO: 2.82 M/UL (ref 4–5.4)
SODIUM SERPL-SCNC: 135 MMOL/L (ref 136–145)
SPHEROCYTES BLD QL SMEAR: ABNORMAL
WBC # BLD AUTO: 5.57 K/UL (ref 3.9–12.7)

## 2024-03-20 PROCEDURE — 84100 ASSAY OF PHOSPHORUS: CPT | Performed by: STUDENT IN AN ORGANIZED HEALTH CARE EDUCATION/TRAINING PROGRAM

## 2024-03-20 PROCEDURE — 63600175 PHARM REV CODE 636 W HCPCS: Performed by: PHYSICIAN ASSISTANT

## 2024-03-20 PROCEDURE — 25000003 PHARM REV CODE 250: Performed by: ANESTHESIOLOGY

## 2024-03-20 PROCEDURE — 36415 COLL VENOUS BLD VENIPUNCTURE: CPT | Performed by: PHYSICIAN ASSISTANT

## 2024-03-20 PROCEDURE — 93010 ELECTROCARDIOGRAM REPORT: CPT | Mod: ,,, | Performed by: INTERNAL MEDICINE

## 2024-03-20 PROCEDURE — 25000003 PHARM REV CODE 250

## 2024-03-20 PROCEDURE — 20600001 HC STEP DOWN PRIVATE ROOM

## 2024-03-20 PROCEDURE — 94761 N-INVAS EAR/PLS OXIMETRY MLT: CPT

## 2024-03-20 PROCEDURE — 25000003 PHARM REV CODE 250: Performed by: PHYSICIAN ASSISTANT

## 2024-03-20 PROCEDURE — 85025 COMPLETE CBC W/AUTO DIFF WBC: CPT | Performed by: STUDENT IN AN ORGANIZED HEALTH CARE EDUCATION/TRAINING PROGRAM

## 2024-03-20 PROCEDURE — 83735 ASSAY OF MAGNESIUM: CPT | Performed by: STUDENT IN AN ORGANIZED HEALTH CARE EDUCATION/TRAINING PROGRAM

## 2024-03-20 PROCEDURE — 80048 BASIC METABOLIC PNL TOTAL CA: CPT | Performed by: PHYSICIAN ASSISTANT

## 2024-03-20 PROCEDURE — 25000242 PHARM REV CODE 250 ALT 637 W/ HCPCS: Performed by: PHYSICIAN ASSISTANT

## 2024-03-20 PROCEDURE — 97535 SELF CARE MNGMENT TRAINING: CPT

## 2024-03-20 PROCEDURE — 93005 ELECTROCARDIOGRAM TRACING: CPT

## 2024-03-20 PROCEDURE — 94640 AIRWAY INHALATION TREATMENT: CPT

## 2024-03-20 PROCEDURE — 25000003 PHARM REV CODE 250: Performed by: STUDENT IN AN ORGANIZED HEALTH CARE EDUCATION/TRAINING PROGRAM

## 2024-03-20 PROCEDURE — 99900035 HC TECH TIME PER 15 MIN (STAT)

## 2024-03-20 PROCEDURE — 27000221 HC OXYGEN, UP TO 24 HOURS

## 2024-03-20 PROCEDURE — 97530 THERAPEUTIC ACTIVITIES: CPT

## 2024-03-20 RX ORDER — ACETYLCYSTEINE 100 MG/ML
4 SOLUTION ORAL; RESPIRATORY (INHALATION)
Status: DISCONTINUED | OUTPATIENT
Start: 2024-03-20 | End: 2024-03-21

## 2024-03-20 RX ORDER — FUROSEMIDE 10 MG/ML
40 INJECTION INTRAMUSCULAR; INTRAVENOUS EVERY 12 HOURS
Status: DISCONTINUED | OUTPATIENT
Start: 2024-03-20 | End: 2024-03-23

## 2024-03-20 RX ORDER — METOPROLOL TARTRATE 25 MG/1
25 TABLET, FILM COATED ORAL ONCE
Status: COMPLETED | OUTPATIENT
Start: 2024-03-20 | End: 2024-03-20

## 2024-03-20 RX ORDER — LANOLIN ALCOHOL/MO/W.PET/CERES
800 CREAM (GRAM) TOPICAL ONCE
Status: COMPLETED | OUTPATIENT
Start: 2024-03-20 | End: 2024-03-20

## 2024-03-20 RX ORDER — METOPROLOL TARTRATE 50 MG/1
50 TABLET ORAL 2 TIMES DAILY
Status: DISCONTINUED | OUTPATIENT
Start: 2024-03-20 | End: 2024-03-25 | Stop reason: HOSPADM

## 2024-03-20 RX ORDER — POTASSIUM CHLORIDE 750 MG/1
10 CAPSULE, EXTENDED RELEASE ORAL 2 TIMES DAILY
Status: DISCONTINUED | OUTPATIENT
Start: 2024-03-20 | End: 2024-03-23

## 2024-03-20 RX ORDER — FUROSEMIDE 40 MG/1
40 TABLET ORAL 2 TIMES DAILY
Status: DISCONTINUED | OUTPATIENT
Start: 2024-03-20 | End: 2024-03-20

## 2024-03-20 RX ORDER — IPRATROPIUM BROMIDE AND ALBUTEROL SULFATE 2.5; .5 MG/3ML; MG/3ML
3 SOLUTION RESPIRATORY (INHALATION)
Status: DISCONTINUED | OUTPATIENT
Start: 2024-03-20 | End: 2024-03-21

## 2024-03-20 RX ADMIN — METOPROLOL TARTRATE 25 MG: 25 TABLET, FILM COATED ORAL at 08:03

## 2024-03-20 RX ADMIN — DOCUSATE SODIUM 100 MG: 100 CAPSULE, LIQUID FILLED ORAL at 09:03

## 2024-03-20 RX ADMIN — IPRATROPIUM BROMIDE AND ALBUTEROL SULFATE 3 ML: .5; 3 SOLUTION RESPIRATORY (INHALATION) at 03:03

## 2024-03-20 RX ADMIN — IPRATROPIUM BROMIDE AND ALBUTEROL SULFATE 3 ML: .5; 3 SOLUTION RESPIRATORY (INHALATION) at 12:03

## 2024-03-20 RX ADMIN — TRAZODONE HYDROCHLORIDE 100 MG: 100 TABLET ORAL at 09:03

## 2024-03-20 RX ADMIN — PANTOPRAZOLE SODIUM 40 MG: 40 TABLET, DELAYED RELEASE ORAL at 08:03

## 2024-03-20 RX ADMIN — DOCUSATE SODIUM 100 MG: 100 CAPSULE, LIQUID FILLED ORAL at 08:03

## 2024-03-20 RX ADMIN — MUPIROCIN 1 G: 20 OINTMENT TOPICAL at 08:03

## 2024-03-20 RX ADMIN — METOPROLOL TARTRATE 25 MG: 25 TABLET, FILM COATED ORAL at 09:03

## 2024-03-20 RX ADMIN — ACETAMINOPHEN 1000 MG: 500 TABLET ORAL at 09:03

## 2024-03-20 RX ADMIN — Medication 800 MG: at 09:03

## 2024-03-20 RX ADMIN — ACETAMINOPHEN 1000 MG: 500 TABLET ORAL at 02:03

## 2024-03-20 RX ADMIN — FUROSEMIDE 40 MG: 10 INJECTION, SOLUTION INTRAVENOUS at 09:03

## 2024-03-20 RX ADMIN — IPRATROPIUM BROMIDE AND ALBUTEROL SULFATE 3 ML: .5; 3 SOLUTION RESPIRATORY (INHALATION) at 07:03

## 2024-03-20 RX ADMIN — ACETYLCYSTEINE 4 ML: 100 INHALANT RESPIRATORY (INHALATION) at 01:03

## 2024-03-20 RX ADMIN — OXYCODONE HYDROCHLORIDE 10 MG: 10 TABLET ORAL at 11:03

## 2024-03-20 RX ADMIN — POLYETHYLENE GLYCOL 3350 17 G: 17 POWDER, FOR SOLUTION ORAL at 08:03

## 2024-03-20 RX ADMIN — OXYCODONE 5 MG: 5 TABLET ORAL at 05:03

## 2024-03-20 RX ADMIN — ACETAMINOPHEN 1000 MG: 500 TABLET ORAL at 05:03

## 2024-03-20 RX ADMIN — OXYCODONE 5 MG: 5 TABLET ORAL at 01:03

## 2024-03-20 RX ADMIN — POTASSIUM CHLORIDE 10 MEQ: 10 CAPSULE, COATED, EXTENDED RELEASE ORAL at 09:03

## 2024-03-20 RX ADMIN — LEVOTHYROXINE SODIUM 150 MCG: 150 TABLET ORAL at 05:03

## 2024-03-20 RX ADMIN — ASPIRIN 325 MG: 325 TABLET, COATED ORAL at 08:03

## 2024-03-20 RX ADMIN — OXYCODONE 5 MG: 5 TABLET ORAL at 10:03

## 2024-03-20 RX ADMIN — METOPROLOL TARTRATE 50 MG: 50 TABLET, FILM COATED ORAL at 09:03

## 2024-03-20 RX ADMIN — ACETYLCYSTEINE 4 ML: 100 INHALANT RESPIRATORY (INHALATION) at 08:03

## 2024-03-20 RX ADMIN — EZETIMIBE 10 MG: 10 TABLET ORAL at 09:03

## 2024-03-20 RX ADMIN — POTASSIUM CHLORIDE 10 MEQ: 10 CAPSULE, COATED, EXTENDED RELEASE ORAL at 11:03

## 2024-03-20 NOTE — PROGRESS NOTES
[facility] - Cardiology Stepdown  Cardiothoracic Surgery  Progress Note    Patient Name: Mora Adrian  MRN: [id number]  Admission Date: 3/15/2024  Hospital Length of Stay: 5 days  Code Status: Full Code   Attending Physician: Pedrito Bernal MD   Referring Provider: Pedrito Bernal MD  Principal Problem:Nonrheumatic mitral valve regurgitation        Subjective:     Post-Op Info:  Procedure(s) (LRB):  REPAIR, MITRAL VALVE, OPEN (N/A)  EXCLUSION, LEFT ATRIAL APPENDAGE, OPEN, AS PART OF OPEN CHEST SURGERY (N/A)   5 Days Post-Op     Interval History: NAEON. Patient O2 saturations have been dropping to 79-80s with ambulation. She was able to do 110 feet yesterday with therapy on oxygen. Can use a platform walker if needed. This morning patient felt dizzy and short of breath after walking to the visitors garden/snack area with O2 saturations 80-81% on 1L. When titrated back to 3L patient was satting at 95% and SOB resolved. Per daughter therapy was cancelled today and patient was told to stay in chair. I reached back out to therapy as we did not give this order. Oxygen can be titrated as needed for a goal of 88% or above. Instructed patient that she should still ambulate as tolerated, even if just around the room pending she is not dizzy or lightheaded. Smaller walks are better than none at all. CXR from last night is stable with some expected fluid. Lasix changed to IV as BP has been stable. Metop increased to 50 for elevated heart rate. Will trial removing purewick and have a bedside commode put in room this afternoon. Breathing treatments ordered. Pacer wires removed. Ok for patient to take shower later if she feels up to it. Incision can get wet and be cleansed with soap and water. Pat dry. Echo from yesterday with normally functioning mitral valve. Patient emotional this morning. Informed her that she is doing well and progressing as expected. Each day she will get a little stronger. Patient and daughter  agree with plan.     Review of Systems   Constitutional: Positive for malaise/fatigue.   Cardiovascular:  Positive for dyspnea on exertion. Negative for chest pain and palpitations.   Respiratory:  Negative for shortness of breath.    Neurological:  Positive for light-headedness (when ambulating earlier).     Medications:  Continuous Infusions:  Scheduled Meds:   acetaminophen  1,000 mg Oral Q8H    acetylcysteine 100 mg/ml (10%)  4 mL Nebulization TID WAKE    albuterol-ipratropium  3 mL Nebulization Q4H WAKE    aspirin  325 mg Oral Daily    docusate sodium  100 mg Oral BID    ezetimibe  10 mg Oral QHS    furosemide (LASIX) injection  40 mg Intravenous Q12H    levothyroxine  150 mcg Oral Before breakfast    magnesium oxide  800 mg Oral Once    metoprolol tartrate  25 mg Oral Once    metoprolol tartrate  50 mg Oral BID    pantoprazole  40 mg Oral Daily    polyethylene glycol  17 g Oral Daily    traZODone  100 mg Oral QHS     PRN Meds:bisacodyL, dextrose 10%, dextrose 10%, ondansetron, oxyCODONE, oxyCODONE, prochlorperazine, sodium chloride 0.9%     Objective:     Vital Signs (Most Recent):  Temp: 97.7 °F (36.5 °C) (03/20/24 0829)  Pulse: 103 (03/20/24 0829)  Resp: 17 (03/20/24 0829)  BP: (!) 146/84 (03/20/24 0829)  SpO2: (!) 93 % (03/20/24 0842) Vital Signs (24h Range):  Temp:  [97.4 °F (36.3 °C)-98.2 °F (36.8 °C)] 97.7 °F (36.5 °C)  Pulse:  [] 103  Resp:  [15-19] 17  SpO2:  [88 %-98 %] 93 %  BP: (109-146)/(66-84) 146/84     Weight: 102.2 kg (225 lb 5 oz)  Body mass index is 36.37 kg/m².    SpO2: (!) 93 %       Intake/Output - Last 3 Shifts         03/18 0700  03/19 0659 03/19 0700 03/20 0659 03/20 0700 03/21 0659    P.O. 240 820 480    I.V. (mL/kg) 49.5 (0.5)      IV Piggyback 248.9      Total Intake(mL/kg) 538.4 (5.2) 820 (7.9) 480 (4.7)    Urine (mL/kg/hr) 1550 (0.6) 2250 (0.9)     Stool  0     Total Output 1550 2250     Net -1011.6 -1430 +480           Stool Occurrence  0 x             Lines/Drains/Airways  "      Drain  Duration             Female External Urinary Catheter w/ Suction 03/17/24 3 days              Line  Duration                  Pacer Wires 03/15/24 1151 4 days              Peripheral Intravenous Line  Duration                  Peripheral IV - Single Lumen 03/15/24 0805 16 G Right;Posterior Forearm 5 days                     Physical Exam  Vitals reviewed.   Constitutional:       General: She is not in acute distress.     Appearance: She is well-developed. She is not diaphoretic.   HENT:      Head: Normocephalic and atraumatic.   Eyes:      Pupils: Pupils are equal, round, and reactive to light.   Neck:      Vascular: No JVD.   Cardiovascular:      Rate and Rhythm: Tachycardia present.      Comments: Midline sternal incision c/d/i  Pulmonary:      Effort: Pulmonary effort is normal. No respiratory distress.      Comments: 3L NC   Abdominal:      General: There is no distension.   Musculoskeletal:         General: Normal range of motion.      Cervical back: Normal range of motion.      Right lower leg: Edema present.      Left lower leg: Edema present.   Skin:     Coloration: Skin is not pale.   Neurological:      Mental Status: She is alert. Mental status is at baseline.   Psychiatric:         Speech: Speech normal.         Behavior: Behavior normal.         Thought Content: Thought content normal.         Judgment: Judgment normal.          Significant Labs:  BMP:   Recent Labs   Lab 03/19/24  0457 03/20/24  0554   GLU 91  --    *  --    K 4.4  --      --    CO2 23  --    BUN 20  --    CREATININE 0.9  --    CALCIUM 8.6*  --    MG 1.9 1.6     Cardiac markers: No results for input(s): "CKMB", "CPKMB", "TROPONINT", "TROPONINI", "MYOGLOBIN" in the last 48 hours.  CBC:   Recent Labs   Lab 03/20/24  0554   WBC 5.57   RBC 2.82*   HGB 9.4*   HCT 27.3*   *   MCV 97   MCH 33.3*   MCHC 34.4     CMP:   Recent Labs   Lab 03/19/24  0457   GLU 91   CALCIUM 8.6*   ALBUMIN 2.9*   PROT 5.1*   * "   K 4.4   CO2 23      BUN 20   CREATININE 0.9   ALKPHOS 57   ALT <5*   AST 16   BILITOT 0.4       Significant Diagnostics:  I have reviewed all pertinent imaging results/findings within the past 24 hours.   Assessment/Plan:     Hyponatremia  BMP daily     Obesity (BMI 30-39.9)  PT OT     Hypophosphatemia  Replaced PO   Daily labs   Resolved     Acute blood loss anemia  Patient with baseline anemia compounded with expected post operative blood loss   CBC daily   (H/H 11.7/36 at pre-op visit)     Status post ligation of left atrial appendage  NSR with some PVCs on monitor   BB increased     S/P mitral valve repair  ASA  BB   Low changed to IV as BP has been stable   Potassium replacements   PT/OT   Bowel regimen   Pain regimen   Cardiac diet with 1500mL fluid restriction   Chest tubes out   Pacer wires removed   Wean oxygen as tolerated. Goal >88%. Ok for patient to ambulate pending no dizziness and titrate oxygen as needed.        Breathing treatments ordered      TTE with no MR     Transient hyperglycemia post procedure  Endocrine following     Hypothyroidism  Home synthroid     HLD (hyperlipidemia)  Home Zetia in place of statin     HTN (hypertension)  Hypotension when in ICU. Received fluids and pressure stable       GERD (gastroesophageal reflux disease)  Protonix     Heart failure with reduced ejection fraction  Wean oxygen as tolerated. Goal O2 saturation 88 and above   Continue Diuresis       Dispo: CSU. D/C when medically stable     Kirti Stone PA-C  Cardiothoracic Surgery  [facility] - Cardiology Stepdown

## 2024-03-20 NOTE — PT/OT/SLP PROGRESS
Physical Therapy      Patient Name:  Mora Adrian   MRN:  [id number]    Patient not seen today secondary to Pillo still requesting therapy visit pt for session with parameters for oxygen titration after morning desaturation during ambulation with nsg. Upon entry to room at 1004, pt requesting PT return in about an hour for pt to recover. OT/PT discussed timing to separate sessions so OT returned in AM for session where she reports pt required oxygen titration to 6 L NC during session but able to return to 2 L NC with increased time and recovery. After discussion with RN, RN requesting hold for PT this date for PM session with Pillo in agreement per chart review. Will follow-up at next scheduled visit.

## 2024-03-20 NOTE — NURSING
Patient walked down hallway on 1L NC. HR increased to 130's and O2 sat decreased to 80-81%, patient symptomatic.SOB, lightheaded reported. Dizziness, CP, and palpitations denied.  YASMIN Ferro notified. Patient sat down in chair, O2  titrated back up to 3L NC and patient SPO2 @ 95% now. Per PA patient is overloaded d/t Lasix being held 1 day and needs to be diuresed. Per PA chest x-ray showed nothing abnormal. No farther orders at this time, plan of care ongoing and patient being monitored.

## 2024-03-20 NOTE — PT/OT/SLP PROGRESS
Occupational Therapy   Treatment    Name: Mora Adrian  MRN: [id number]  Admitting Diagnosis:  Nonrheumatic mitral valve regurgitation  5 Days Post-Op    Recommendations:     Discharge Recommendations: Low Intensity Therapy  Discharge Equipment Recommendations:  none  Barriers to discharge:  None    Assessment:     Mora Adrian is a 68 y.o. female with a medical diagnosis of Nonrheumatic mitral valve regurgitation. Pt presents with decreased endurance and impaired mobility performance as limited by cardiovascular status and generalized weakness. Pt found upright in chair and agreeable for therapy. Pt motivated to participate today with session focused on functional mobility into restroom for standing pericare/donning of personal brief as pt found with saturated purewick.     Prior to this session (755am), ALEX Lamb politely requesting therapy hold prior to vital sign measures for pt safety. Per Pillo, therapy was requested to return later mid-morning/time of this session with specific parameters in place for pt safety. Per ALEX Lamb, pt should not exceed 6L 02 with Sp02 needing to remain <88%.     During mobility today, pt began on 2L for sit<stand from chair. Pt needed 3L 02 requirement initially as pt began desaturating from 97% to 93% during this stand.  As pt began ambulating and standing in restroom for OT to A with pericare ~2-3 minute duration, pt with complain of mild chest discomfort 6/10. Pt also reported fatigue therefore was safely returned to chair. Pt dropped to 79% Sp02 after static  restroom with this OT increasing 3L02 to 6L per RN request. Given time, pursed lip breathing, and rest pt was able to return to 2L02 with Sp02 ~96%. RN notified of performance for pt's safety. Per chart review, team was notified of pt's performance to ensure pt medically cleared for future ambulation.  This therapist delivered and setup a BSC for pt to use with staff at this time.     Patient  continues to demonstrate the need for low intensity therapy on a scheduled basis exhibited by decreased independence with self-care and functional mobility   Performance deficits affecting function are weakness, impaired self care skills, impaired endurance, impaired functional mobility, gait instability, decreased lower extremity function, decreased upper extremity function, pain, impaired balance, decreased safety awareness, impaired cardiopulmonary response to activity.     Rehab Prognosis:  Good; patient would benefit from acute skilled OT services to address these deficits and reach maximum level of function.       Plan:     Patient to be seen 5 x/week to address the above listed problems via self-care/home management, therapeutic activities, therapeutic exercises, neuromuscular re-education  Plan of Care Expires: 04/15/24  Plan of Care Reviewed with: patient, daughter    Subjective     Chief Complaint: fatigue, mild chest discomfort 5-6/10 during session  Patient/Family Comments/goals: to walk  Pain/Comfort:  Pain Rating 1: 5/10  Location 1: sternal  Pain Rating Post-Intervention 1: 6/10  Location 2: sternal  Pain Addressed 2: Reposition, Distraction, Cessation of Activity, Nurse notified    Objective:     Communicated with: RN prior to session.  Patient found up in chair with telemetry, PureWick, oxygen upon OT entry to room.    General Precautions: Standard, fall, sternal    Orthopedic Precautions:N/A  Braces: N/A  Respiratory Status: Nasal cannula, flow 2 L/min     Occupational Performance:     Bed Mobility:    NT    Functional Mobility/Transfers:  Patient completed Sit <> Stand Transfer with moderate assistance  with  hand-held assist   Functional Mobility: Pt stood from chair with mod A  and ambulated into restroom with SBA-CGA. Pt tolerated standing ~3 minutes for pericare (total). Pt with gown donned and ambulated to chair. From chair, pt briefly had personal briefs replaced in standing with min A with  daughter A from balance.  Pt needed time for 02 recovery during this session during transition movements--see above.    Activities of Daily Living:  Upper Body Dressing: moderate assistance donning gown x2   Lower Body Dressing: total assistance donning underwear in standing   Toileting: total assistance pericare performed in standing       [facility] 6 Click ADL: 16    Treatment & Education:  Pt educated on role of occupational therapy, POC, and safety during ADLs and functional mobility. Pt and OT discussed importance of safe, continued mobility to optimize daily living skills. Pt verbalized understanding.     White board updated during session. Pt given instruction to call for medical staff/nurse for assistance.       Patient left up in chair with all lines intact, call button in reach, and RN Zainab notified    GOALS:   Multidisciplinary Problems       Occupational Therapy Goals          Problem: Occupational Therapy    Goal Priority Disciplines Outcome Interventions   Occupational Therapy Goal     OT, PT/OT Ongoing, Progressing    Description: Goals to be met by: 03-30-24     Patient will increase functional independence with ADLs by performing:    UE Dressing with [place].  LE Dressing with Supervision.  Grooming while standing at sink with Supervision.  Toileting from toilet with Supervision for hygiene and clothing management.   Supine to sit with Supervision.  Stand pivot transfers with Supervision.  Toilet transfer to toilet with Supervision.  Pt. To recall sternal precautions with 100% accuracy                         Time Tracking:     OT Date of Treatment: 03/20/24  OT Start Time: 1048  OT Stop Time: 1126  OT Total Time (min): 38 min    Billable Minutes:Self Care/Home Management 24 min  Therapeutic Activity 14 min    OT/HERMAN: OT          3/20/2024

## 2024-03-20 NOTE — PLAN OF CARE
03/20/24 1437   Discharge Reassessment   Assessment Type Discharge Planning Reassessment   Did the patient's condition or plan change since previous assessment? No   Discharge Plan discussed with: Patient;Parent(s);Sibling   Name(s) and Number(s) mother and sister at the bedside.   Communicated LUIS MANUEL with patient/caregiver Yes   Discharge Plan A Home Health;Home with family   Discharge Plan B Home with family   DME Needed Upon Discharge  none   Transition of Care Barriers None   Why the patient remains in the hospital Requires continued medical care     Patient up in chair at the bedside. Spoke with her regarding discharge and suggestions for home health due to not meeting criteria at this time for rehab. She verbalized understanding . Discussed choices for home health and she stated she would like  [facility] due to she [facility] them for her . Informed her I will try to set it up for her . Informed her if she needed oxygen on discharge , she needed to meet criteria but if needed can be sent up for her. Again she verbalized understanding.     Lashawn Devries RN    [phone]      Discharge Plan A and Plan B have been determined by review of patient's clinical status, future medical and therapeutic needs, and coverage/benefits for post-acute care in coordination with multidisciplinary team members.

## 2024-03-20 NOTE — SUBJECTIVE & OBJECTIVE
Interval History: NAEON. Patient O2 saturations have been dropping to 79-80s with ambulation. She was able to do 110 feet yesterday with therapy on oxygen. Can use a platform walker if needed. This morning patient felt dizzy and short of breath after walking to the visitors garden/snack area with O2 saturations 80-81% on 1L. When titrated back to 3L patient was satting at 95% and SOB resolved. Per daughter therapy was cancelled today and patient was told to stay in chair. I reached back out to therapy as we did not give this order. Oxygen can be titrated as needed for a goal of 88% or above. Instructed patient that she should still ambulate as tolerated, even if just around the room pending she is not dizzy or lightheaded. Smaller walks are better than none at all. CXR from last night is stable with some expected fluid. Lasix changed to IV as BP has been stable. Metop increased to 50 for elevated heart rate. Will trial removing purewick and have a bedside commode put in room this afternoon. Breathing treatments ordered. Pacer wires removed. Ok for patient to take shower later if she feels up to it. Incision can get wet and be cleansed with soap and water. Pat dry. Echo from today with normally functioning mitral valve. Patient emotional this morning. Informed her that she is doing well and progressing as expected. Each day she will get a little stronger. Patient and daughter agree with plan.     Review of Systems   Constitutional: Positive for malaise/fatigue.   Cardiovascular:  Positive for dyspnea on exertion. Negative for chest pain and palpitations.   Respiratory:  Negative for shortness of breath.    Neurological:  Positive for light-headedness (when ambulating earlier).     Medications:  Continuous Infusions:  Scheduled Meds:   acetaminophen  1,000 mg Oral Q8H    acetylcysteine 100 mg/ml (10%)  4 mL Nebulization TID WAKE    albuterol-ipratropium  3 mL Nebulization Q4H WAKE    aspirin  325 mg Oral Daily     docusate sodium  100 mg Oral BID    ezetimibe  10 mg Oral QHS    furosemide (LASIX) injection  40 mg Intravenous Q12H    levothyroxine  150 mcg Oral Before breakfast    magnesium oxide  800 mg Oral Once    metoprolol tartrate  25 mg Oral Once    metoprolol tartrate  50 mg Oral BID    pantoprazole  40 mg Oral Daily    polyethylene glycol  17 g Oral Daily    traZODone  100 mg Oral QHS     PRN Meds:bisacodyL, dextrose 10%, dextrose 10%, ondansetron, oxyCODONE, oxyCODONE, prochlorperazine, sodium chloride 0.9%     Objective:     Vital Signs (Most Recent):  Temp: 97.7 °F (36.5 °C) (03/20/24 0829)  Pulse: 103 (03/20/24 0829)  Resp: 17 (03/20/24 0829)  BP: (!) 146/84 (03/20/24 0829)  SpO2: (!) 93 % (03/20/24 0842) Vital Signs (24h Range):  Temp:  [97.4 °F (36.3 °C)-98.2 °F (36.8 °C)] 97.7 °F (36.5 °C)  Pulse:  [] 103  Resp:  [15-19] 17  SpO2:  [88 %-98 %] 93 %  BP: (109-146)/(66-84) 146/84     Weight: 102.2 kg (225 lb 5 oz)  Body mass index is 36.37 kg/m².    SpO2: (!) 93 %       Intake/Output - Last 3 Shifts         03/18 0700 03/19 0659 03/19 0700 03/20 0659 03/20 0700  03/21 0659    P.O. 240 820 480    I.V. (mL/kg) 49.5 (0.5)      IV Piggyback 248.9      Total Intake(mL/kg) 538.4 (5.2) 820 (7.9) 480 (4.7)    Urine (mL/kg/hr) 1550 (0.6) 2250 (0.9)     Stool  0     Total Output 1550 2250     Net -1011.6 -1430 +480           Stool Occurrence  0 x             Lines/Drains/Airways       Drain  Duration             Female External Urinary Catheter w/ Suction 03/17/24 3 days              Line  Duration                  Pacer Wires 03/15/24 1151 4 days              Peripheral Intravenous Line  Duration                  Peripheral IV - Single Lumen 03/15/24 0805 16 G Right;Posterior Forearm 5 days                     Physical Exam  Vitals reviewed.   Constitutional:       General: She is not in acute distress.     Appearance: She is well-developed. She is not diaphoretic.   HENT:      Head: Normocephalic and  "atraumatic.   Eyes:      Pupils: Pupils are equal, round, and reactive to light.   Neck:      Vascular: No JVD.   Cardiovascular:      Rate and Rhythm: Tachycardia present.      Comments: Midline sternal incision c/d/i  Pulmonary:      Effort: Pulmonary effort is normal. No respiratory distress.      Comments: 3L NC   Abdominal:      General: There is no distension.   Musculoskeletal:         General: Normal range of motion.      Cervical back: Normal range of motion.      Right lower leg: Edema present.      Left lower leg: Edema present.   Skin:     Coloration: Skin is not pale.   Neurological:      Mental Status: She is alert. Mental status is at baseline.   Psychiatric:         Speech: Speech normal.         Behavior: Behavior normal.         Thought Content: Thought content normal.         Judgment: Judgment normal.          Significant Labs:  BMP:   Recent Labs   Lab 03/19/24  0457 03/20/24  0554   GLU 91  --    *  --    K 4.4  --      --    CO2 23  --    BUN 20  --    CREATININE 0.9  --    CALCIUM 8.6*  --    MG 1.9 1.6     Cardiac markers: No results for input(s): "CKMB", "CPKMB", "TROPONINT", "TROPONINI", "MYOGLOBIN" in the last 48 hours.  CBC:   Recent Labs   Lab 03/20/24  0554   WBC 5.57   RBC 2.82*   HGB 9.4*   HCT 27.3*   *   MCV 97   MCH 33.3*   MCHC 34.4     CMP:   Recent Labs   Lab 03/19/24  0457   GLU 91   CALCIUM 8.6*   ALBUMIN 2.9*   PROT 5.1*   *   K 4.4   CO2 23      BUN 20   CREATININE 0.9   ALKPHOS 57   ALT <5*   AST 16   BILITOT 0.4       Significant Diagnostics:  I have reviewed all pertinent imaging results/findings within the past 24 hours.  "

## 2024-03-20 NOTE — PLAN OF CARE
03/20/24 1509   Post-Acute Status   Post-Acute Authorization Home Health   Home Health Status Set-up Complete/Auth obtained     Patient accepted per [facility] . Spoke with Briseida at [phone] who requested the LUIS MANUEL.     Lashawn Devries RN CM   [phone]

## 2024-03-20 NOTE — NURSING
Patient ordered to ambulate with PT/OT per YASMIN Ferro and to increase O2 as needed if de-sating occurred. OT presented to assist patient with ADLS. Informed therapist if O2 has to be bumped pass 6L end session and have patient sit. Per OT patient started at 2L O2 NC sating at 97% and dropped to 90% just with standing. Beginning to ambulate,dropped to the 80's,then being bumped up to 3L from 6L patient made it to restroom and dropped as low as 79%. Patient symptomatic complaining of SOB and lightheadedness. Therapy session ended and patient sat up in recliner chair.Informed YASMIN Ferro with my concern of safety as the patient becomes lightheaded and SOB,and don't think its a good idea to continue to walk today with PT, PA agreed. CXR ordered per PA to make sure no significant changes noted. No farther orders at this time, plan of care onging.

## 2024-03-20 NOTE — ASSESSMENT & PLAN NOTE
ASA  BB   Low changed to IV as BP has been stable   Potassium replacements   PT/OT   Bowel regimen   Pain regimen   Cardiac diet with 1500mL fluid restriction   Chest tubes out   Pacer wires removed   Wean oxygen as tolerated. Goal >88%. Ok for patient to ambulate pending no dizziness and titrate oxygen as needed.        Breathing treatments ordered      TTE with no MR

## 2024-03-20 NOTE — CARE UPDATE
I have reviewed the chart of Mora Adrian who is hospitalized for the following:    Active Hospital Problems    Diagnosis    *Nonrheumatic mitral valve regurgitation    Obesity (BMI 30-39.9)    Hyponatremia    S/P mitral valve repair    Status post ligation of left atrial appendage    Acute blood loss anemia    Hypophosphatemia    Transient hyperglycemia post procedure    Surgical wound present    Heart failure with reduced ejection fraction    GERD (gastroesophageal reflux disease)    HTN (hypertension)    HLD (hyperlipidemia)    Hypothyroidism        Marita Reddy NP  [facility]

## 2024-03-20 NOTE — PLAN OF CARE
03/20/24 1454   Post-Acute Status   Post-Acute Authorization [facility]   Home Health Status Referrals Sent     Met with patient to review discharge recommendation of HH and is agreeable to plan    Patient/family provided list of facilities in-network with patient's payor plan. Providers that are owned, operated, or affiliated with [facility] are included on the list.     Notified that referral sent per [facility] to below listed facilities from in-network list based on proximity to home/family support:   [facility]  2.   [facility]  3.   [facility]  4.  5. (can send more than 5)    Patient/family instructed to identify preference.    Preferred Facility: (if more than 1, listed in order of descending preference)  [facility]     If an additional preferred facility not listed above is identified, additional referral to be sent. If above facilities unable to accept, will send additional referrals to in-network providers. Called [facility] and spoke with Elizabeth in intake and they are unable to accommodate at this time , stated they are backed up. Patient aware and stated to please try through [facility].     Lashawn Devries RN    [phone]

## 2024-03-21 LAB
ANION GAP SERPL CALC-SCNC: 8 MMOL/L (ref 8–16)
BASOPHILS # BLD AUTO: 0.04 K/UL (ref 0–0.2)
BASOPHILS NFR BLD: 0.8 % (ref 0–1.9)
BUN SERPL-MCNC: 21 MG/DL (ref 8–23)
CALCIUM SERPL-MCNC: 9.1 MG/DL (ref 8.7–10.5)
CHLORIDE SERPL-SCNC: 99 MMOL/L (ref 95–110)
CO2 SERPL-SCNC: 28 MMOL/L (ref 23–29)
CREAT SERPL-MCNC: 0.9 MG/DL (ref 0.5–1.4)
DIFFERENTIAL METHOD BLD: ABNORMAL
EOSINOPHIL # BLD AUTO: 0.2 K/UL (ref 0–0.5)
EOSINOPHIL NFR BLD: 2.9 % (ref 0–8)
ERYTHROCYTE [DISTWIDTH] IN BLOOD BY AUTOMATED COUNT: 13.3 % (ref 11.5–14.5)
EST. GFR  (NO RACE VARIABLE): >60 ML/MIN/1.73 M^2
GLUCOSE SERPL-MCNC: 97 MG/DL (ref 70–110)
HCT VFR BLD AUTO: 27.5 % (ref 37–48.5)
HGB BLD-MCNC: 8.9 G/DL (ref 12–16)
IMM GRANULOCYTES # BLD AUTO: 0.04 K/UL (ref 0–0.04)
IMM GRANULOCYTES NFR BLD AUTO: 0.8 % (ref 0–0.5)
LYMPHOCYTES # BLD AUTO: 1.6 K/UL (ref 1–4.8)
LYMPHOCYTES NFR BLD: 31.5 % (ref 18–48)
MAGNESIUM SERPL-MCNC: 1.6 MG/DL (ref 1.6–2.6)
MCH RBC QN AUTO: 32.7 PG (ref 27–31)
MCHC RBC AUTO-ENTMCNC: 32.4 G/DL (ref 32–36)
MCV RBC AUTO: 101 FL (ref 82–98)
MONOCYTES # BLD AUTO: 0.8 K/UL (ref 0.3–1)
MONOCYTES NFR BLD: 15.1 % (ref 4–15)
NEUTROPHILS # BLD AUTO: 2.5 K/UL (ref 1.8–7.7)
NEUTROPHILS NFR BLD: 48.9 % (ref 38–73)
NRBC BLD-RTO: 0 /100 WBC
PHOSPHATE SERPL-MCNC: 3.6 MG/DL (ref 2.7–4.5)
PLATELET # BLD AUTO: 160 K/UL (ref 150–450)
PMV BLD AUTO: 9.9 FL (ref 9.2–12.9)
POTASSIUM SERPL-SCNC: 4.2 MMOL/L (ref 3.5–5.1)
RBC # BLD AUTO: 2.72 M/UL (ref 4–5.4)
SODIUM SERPL-SCNC: 135 MMOL/L (ref 136–145)
WBC # BLD AUTO: 5.17 K/UL (ref 3.9–12.7)

## 2024-03-21 PROCEDURE — 85025 COMPLETE CBC W/AUTO DIFF WBC: CPT | Performed by: STUDENT IN AN ORGANIZED HEALTH CARE EDUCATION/TRAINING PROGRAM

## 2024-03-21 PROCEDURE — 25000003 PHARM REV CODE 250

## 2024-03-21 PROCEDURE — 97530 THERAPEUTIC ACTIVITIES: CPT

## 2024-03-21 PROCEDURE — 83735 ASSAY OF MAGNESIUM: CPT | Performed by: STUDENT IN AN ORGANIZED HEALTH CARE EDUCATION/TRAINING PROGRAM

## 2024-03-21 PROCEDURE — 97535 SELF CARE MNGMENT TRAINING: CPT

## 2024-03-21 PROCEDURE — 25000003 PHARM REV CODE 250: Performed by: ANESTHESIOLOGY

## 2024-03-21 PROCEDURE — 25000003 PHARM REV CODE 250: Performed by: PHYSICIAN ASSISTANT

## 2024-03-21 PROCEDURE — 25000242 PHARM REV CODE 250 ALT 637 W/ HCPCS: Performed by: PHYSICIAN ASSISTANT

## 2024-03-21 PROCEDURE — 94761 N-INVAS EAR/PLS OXIMETRY MLT: CPT

## 2024-03-21 PROCEDURE — 94640 AIRWAY INHALATION TREATMENT: CPT

## 2024-03-21 PROCEDURE — 63600175 PHARM REV CODE 636 W HCPCS: Performed by: PHYSICIAN ASSISTANT

## 2024-03-21 PROCEDURE — 84100 ASSAY OF PHOSPHORUS: CPT | Performed by: STUDENT IN AN ORGANIZED HEALTH CARE EDUCATION/TRAINING PROGRAM

## 2024-03-21 PROCEDURE — 20600001 HC STEP DOWN PRIVATE ROOM

## 2024-03-21 PROCEDURE — 36415 COLL VENOUS BLD VENIPUNCTURE: CPT | Performed by: PHYSICIAN ASSISTANT

## 2024-03-21 PROCEDURE — 25000003 PHARM REV CODE 250: Performed by: STUDENT IN AN ORGANIZED HEALTH CARE EDUCATION/TRAINING PROGRAM

## 2024-03-21 PROCEDURE — 99900035 HC TECH TIME PER 15 MIN (STAT)

## 2024-03-21 PROCEDURE — 97116 GAIT TRAINING THERAPY: CPT

## 2024-03-21 PROCEDURE — 27000221 HC OXYGEN, UP TO 24 HOURS

## 2024-03-21 PROCEDURE — 80048 BASIC METABOLIC PNL TOTAL CA: CPT | Performed by: PHYSICIAN ASSISTANT

## 2024-03-21 RX ORDER — ACETAMINOPHEN 500 MG
1000 TABLET ORAL EVERY 6 HOURS
Status: DISCONTINUED | OUTPATIENT
Start: 2024-03-21 | End: 2024-03-25 | Stop reason: HOSPADM

## 2024-03-21 RX ORDER — LEVALBUTEROL INHALATION SOLUTION 0.63 MG/3ML
0.63 SOLUTION RESPIRATORY (INHALATION) EVERY 12 HOURS
Status: DISCONTINUED | OUTPATIENT
Start: 2024-03-21 | End: 2024-03-24

## 2024-03-21 RX ORDER — FUROSEMIDE 10 MG/ML
20 INJECTION INTRAMUSCULAR; INTRAVENOUS ONCE
Status: COMPLETED | OUTPATIENT
Start: 2024-03-21 | End: 2024-03-21

## 2024-03-21 RX ORDER — LANOLIN ALCOHOL/MO/W.PET/CERES
800 CREAM (GRAM) TOPICAL DAILY
Status: DISCONTINUED | OUTPATIENT
Start: 2024-03-21 | End: 2024-03-25 | Stop reason: HOSPADM

## 2024-03-21 RX ORDER — METHOCARBAMOL 500 MG/1
500 TABLET, FILM COATED ORAL 3 TIMES DAILY
Status: DISCONTINUED | OUTPATIENT
Start: 2024-03-21 | End: 2024-03-25 | Stop reason: HOSPADM

## 2024-03-21 RX ORDER — ACETYLCYSTEINE 100 MG/ML
4 SOLUTION ORAL; RESPIRATORY (INHALATION) 2 TIMES DAILY
Status: DISCONTINUED | OUTPATIENT
Start: 2024-03-22 | End: 2024-03-21

## 2024-03-21 RX ADMIN — ACETAMINOPHEN 1000 MG: 500 TABLET ORAL at 11:03

## 2024-03-21 RX ADMIN — ASPIRIN 325 MG: 325 TABLET, COATED ORAL at 09:03

## 2024-03-21 RX ADMIN — METHOCARBAMOL 500 MG: 500 TABLET ORAL at 08:03

## 2024-03-21 RX ADMIN — DOCUSATE SODIUM 100 MG: 100 CAPSULE, LIQUID FILLED ORAL at 08:03

## 2024-03-21 RX ADMIN — ACETYLCYSTEINE 4 ML: 100 INHALANT RESPIRATORY (INHALATION) at 07:03

## 2024-03-21 RX ADMIN — OXYCODONE 5 MG: 5 TABLET ORAL at 04:03

## 2024-03-21 RX ADMIN — PANTOPRAZOLE SODIUM 40 MG: 40 TABLET, DELAYED RELEASE ORAL at 09:03

## 2024-03-21 RX ADMIN — ACETAMINOPHEN 1000 MG: 500 TABLET ORAL at 01:03

## 2024-03-21 RX ADMIN — METOPROLOL TARTRATE 50 MG: 50 TABLET, FILM COATED ORAL at 08:03

## 2024-03-21 RX ADMIN — FUROSEMIDE 20 MG: 10 INJECTION, SOLUTION INTRAMUSCULAR; INTRAVENOUS at 11:03

## 2024-03-21 RX ADMIN — LEVALBUTEROL HYDROCHLORIDE 0.63 MG: 0.63 SOLUTION RESPIRATORY (INHALATION) at 09:03

## 2024-03-21 RX ADMIN — IPRATROPIUM BROMIDE AND ALBUTEROL SULFATE 3 ML: .5; 3 SOLUTION RESPIRATORY (INHALATION) at 07:03

## 2024-03-21 RX ADMIN — POTASSIUM CHLORIDE 10 MEQ: 10 CAPSULE, COATED, EXTENDED RELEASE ORAL at 09:03

## 2024-03-21 RX ADMIN — POTASSIUM CHLORIDE 10 MEQ: 10 CAPSULE, COATED, EXTENDED RELEASE ORAL at 08:03

## 2024-03-21 RX ADMIN — FUROSEMIDE 40 MG: 10 INJECTION, SOLUTION INTRAVENOUS at 08:03

## 2024-03-21 RX ADMIN — FUROSEMIDE 40 MG: 10 INJECTION, SOLUTION INTRAVENOUS at 09:03

## 2024-03-21 RX ADMIN — ACETAMINOPHEN 1000 MG: 500 TABLET ORAL at 06:03

## 2024-03-21 RX ADMIN — OXYCODONE 5 MG: 5 TABLET ORAL at 11:03

## 2024-03-21 RX ADMIN — TRAZODONE HYDROCHLORIDE 100 MG: 100 TABLET ORAL at 09:03

## 2024-03-21 RX ADMIN — DOCUSATE SODIUM 100 MG: 100 CAPSULE, LIQUID FILLED ORAL at 09:03

## 2024-03-21 RX ADMIN — METOPROLOL TARTRATE 50 MG: 50 TABLET, FILM COATED ORAL at 09:03

## 2024-03-21 RX ADMIN — EZETIMIBE 10 MG: 10 TABLET ORAL at 08:03

## 2024-03-21 RX ADMIN — METHOCARBAMOL 500 MG: 500 TABLET ORAL at 02:03

## 2024-03-21 RX ADMIN — Medication 800 MG: at 09:03

## 2024-03-21 RX ADMIN — LEVOTHYROXINE SODIUM 150 MCG: 150 TABLET ORAL at 06:03

## 2024-03-21 RX ADMIN — POLYETHYLENE GLYCOL 3350 17 G: 17 POWDER, FOR SOLUTION ORAL at 09:03

## 2024-03-21 NOTE — PLAN OF CARE
AAOX4,VSS,O2 sats>87% on 2L NC(see flowsheet).Plan of care discussed with patient. Patient desatting with activity as low as 79% on 6L O2. Short progressive walks daily, ambulating with assistance and O2 monitoring until improvement in patients O2 status with activity. Patient being diuresed on IVP Lasix.Patient complains of midsternal incision pain and SOB with activity.Discussed medications and care.Patient has no questions at this time.Pt visualised and stable.Call light within reach.Pt resting,bed at lowest position.No farther orders at this time. Plan of care ongoing.      Problem: Adult Inpatient Plan of Care  Goal: Plan of Care Review  Outcome: Ongoing, Progressing  Goal: Patient-Specific Goal (Individualized)  Outcome: Ongoing, Progressing  Goal: Absence of Hospital-Acquired Illness or Injury  Outcome: Ongoing, Progressing  Goal: Optimal Comfort and Wellbeing  Outcome: Ongoing, Progressing  Goal: Readiness for Transition of Care  Outcome: Ongoing, Not Progressing     Problem: Activity Intolerance (Cardiovascular Surgery)  Goal: Improved Activity Tolerance  Outcome: Ongoing, Not Progressing     Problem: Adjustment to Surgery (Cardiovascular Surgery)  Goal: Optimal Coping with Heart Surgery  Outcome: Ongoing, Progressing     Problem: Bleeding (Cardiovascular Surgery)  Goal: Bleeding (Cardiovascular Surgery)  Outcome: Ongoing, Progressing     Problem: Bowel Motility Impaired (Cardiovascular Surgery)  Goal: Effective Bowel Elimination (Cardiovascular Surgery)  Outcome: Ongoing, Progressing     Problem: Cardiac Function Impaired (Cardiovascular Surgery)  Goal: Effective Cardiac Function  Outcome: Ongoing, Progressing     Problem: Cerebral Tissue Perfusion (Cardiovascular Surgery)  Goal: Optimal Cerebral Tissue Perfusion (Cardiovascular Surgery)  Outcome: Ongoing, Progressing     Problem: Fluid and Electrolyte Imbalance (Cardiovascular Surgery)  Goal: Fluid and Electrolyte Balance (Cardiovascular  Surgery)  Outcome: Ongoing, Progressing     Problem: Glycemic Control Impaired (Cardiovascular Surgery)  Goal: Blood Glucose Level Within Targeted Range (Cardiovascular Surgery)  Outcome: Ongoing, Progressing     Problem: Infection (Cardiovascular Surgery)  Goal: Absence of Infection Signs and Symptoms  Outcome: Ongoing, Progressing     Problem: Ongoing Anesthesia Effects (Cardiovascular Surgery)  Goal: Anesthesia/Sedation Recovery  Outcome: Ongoing, Progressing     Problem: Pain (Cardiovascular Surgery)  Goal: Acceptable Pain Control  Outcome: Ongoing, Progressing     Problem: Postoperative Nausea and Vomiting (Cardiovascular Surgery)  Goal: Nausea and Vomiting Relief (Cardiovascular Surgery)  Outcome: Ongoing, Progressing     Problem: Postoperative Urinary Retention (Cardiovascular Surgery)  Goal: Effective Urinary Elimination (Cardiovascular Surgery)  Outcome: Ongoing, Progressing     Problem: Respiratory Compromise (Cardiovascular Surgery)  Goal: Effective Oxygenation and Ventilation (Cardiovascular Surgery)  Outcome: Ongoing, Not Progressing     Problem: Infection  Goal: Absence of Infection Signs and Symptoms  Outcome: Ongoing, Progressing     Problem: Skin Injury Risk Increased  Goal: Skin Health and Integrity  Outcome: Ongoing, Progressing     Problem: Fall Injury Risk  Goal: Absence of Fall and Fall-Related Injury  Outcome: Ongoing, Progressing

## 2024-03-21 NOTE — ASSESSMENT & PLAN NOTE
ASA  BB   Lasix 40 IV BID. Will give an extra 20 at lunch time   Potassium replacements   PT/OT   Bowel regimen   Pain regimen   Cardiac diet with 1500mL fluid restriction. Boost added   Chest tubes out   Pacer wires removed   Wean oxygen as tolerated. Goal >88%. Ok for patient to ambulate pending no dizziness and titrate oxygen as needed.  Can use platform walker.       Breathing treatments ordered. Changed Duo-neb to Xopenex because patient says last night it made her jittery.      TTE with no MR

## 2024-03-21 NOTE — PROGRESS NOTES
[facility] - Cardiology Stepdown  Cardiothoracic Surgery  Progress Note    Patient Name: Mora Adrian  MRN: [id number]  Admission Date: 3/15/2024  Hospital Length of Stay: 6 days  Code Status: Full Code   Attending Physician: Pedrito Bernal MD   Referring Provider: Pedrito Bernal MD  Principal Problem:Nonrheumatic mitral valve regurgitation        Subjective:     Post-Op Info:  Procedure(s) (LRB):  REPAIR, MITRAL VALVE, OPEN (N/A)  EXCLUSION, LEFT ATRIAL APPENDAGE, OPEN, AS PART OF OPEN CHEST SURGERY (N/A)   6 Days Post-Op     Interval History: NAEON. Patient reports feeling ok this morning. Feels somewhat emotional because her  was hospitalized for a while and this brings back those memories. Discussed being patient with her body and taking shorter walks. Ok for her to use a platform walker. Responded well to diuresis yesterday. Will give an extra push of lasix today. Oxygen turned down to 1L. Patient reports feeling short winded when she doesn't have the oxygen. Both Dr. Bernal and myself explained the concept of fluid retention following surgery and the importance of ambulation as tolerated to help re-expand lungs. Patient is using her IS routinely. Will add Boost to her meals.     Review of Systems   Constitutional: Positive for malaise/fatigue.   Cardiovascular:  Positive for dyspnea on exertion. Negative for chest pain and palpitations.   Neurological:  Positive for weakness.     Medications:  Continuous Infusions:  Scheduled Meds:   acetaminophen  1,000 mg Oral Q8H    acetylcysteine 100 mg/ml (10%)  4 mL Nebulization TID WAKE    aspirin  325 mg Oral Daily    docusate sodium  100 mg Oral BID    ezetimibe  10 mg Oral QHS    furosemide (LASIX) injection  20 mg Intravenous Once    furosemide (LASIX) injection  40 mg Intravenous Q12H    levalbuterol  0.63 mg Nebulization Q12H    levothyroxine  150 mcg Oral Before breakfast    magnesium oxide  800 mg Oral Daily    metoprolol tartrate  50 mg Oral  BID    pantoprazole  40 mg Oral Daily    polyethylene glycol  17 g Oral Daily    potassium chloride  10 mEq Oral BID    traZODone  100 mg Oral QHS     PRN Meds:bisacodyL, dextrose 10%, dextrose 10%, ondansetron, oxyCODONE, oxyCODONE, prochlorperazine, sodium chloride 0.9%     Objective:     Vital Signs (Most Recent):  Temp: 97.3 °F (36.3 °C) (03/21/24 0448)  Pulse: 89 (03/21/24 0725)  Resp: 20 (03/21/24 0725)  BP: 130/64 (03/21/24 0448)  SpO2: 95 % (03/21/24 0725) Vital Signs (24h Range):  Temp:  [97.3 °F (36.3 °C)-98.1 °F (36.7 °C)] 97.3 °F (36.3 °C)  Pulse:  [] 89  Resp:  [17-22] 20  SpO2:  [90 %-100 %] 95 %  BP: (116-138)/(56-75) 130/64     Weight: 100.7 kg (222 lb 0.1 oz)  Body mass index is 35.83 kg/m².    SpO2: 95 %       Intake/Output - Last 3 Shifts         03/19 0700  03/20 0659 03/20 0700  03/21 0659 03/21 0700  03/22 0659    P.O. 820 1200     I.V. (mL/kg)       IV Piggyback       Total Intake(mL/kg) 820 (7.9) 1200 (11.7)     Urine (mL/kg/hr) 2250 (0.9) 3200 (1.3)     Stool 0      Total Output 2250 3200     Net -1430 -2000            Stool Occurrence 0 x              Lines/Drains/Airways       Drain  Duration             Female External Urinary Catheter w/ Suction 03/17/24 4 days              Line  Duration                  Pacer Wires 03/15/24 1151 5 days              Peripheral Intravenous Line  Duration                  Peripheral IV - Single Lumen 03/15/24 0805 16 G Right;Posterior Forearm 6 days                     Physical Exam  Vitals reviewed.   Constitutional:       General: She is not in acute distress.     Appearance: She is well-developed. She is not diaphoretic.   HENT:      Head: Normocephalic and atraumatic.   Eyes:      Pupils: Pupils are equal, round, and reactive to light.   Neck:      Vascular: No JVD.   Cardiovascular:      Rate and Rhythm: Normal rate and regular rhythm.      Comments: Midline sternal incision c/d/i  Pulmonary:      Effort: Pulmonary effort is normal. No  "respiratory distress.   Musculoskeletal:         General: Swelling (minimal) present. Normal range of motion.      Cervical back: Normal range of motion.   Skin:     Coloration: Skin is not pale.   Neurological:      Mental Status: She is alert. Mental status is at baseline.   Psychiatric:         Speech: Speech normal.         Behavior: Behavior normal.         Thought Content: Thought content normal.         Judgment: Judgment normal.        Significant Labs:  BMP:   Recent Labs   Lab 03/21/24  0337   GLU 97   *   K 4.2   CL 99   CO2 28   BUN 21   CREATININE 0.9   CALCIUM 9.1   MG 1.6     Cardiac markers: No results for input(s): "CKMB", "CPKMB", "TROPONINT", "TROPONINI", "MYOGLOBIN" in the last 48 hours.  CBC:   Recent Labs   Lab 03/21/24  0337   WBC 5.17   RBC 2.72*   HGB 8.9*   HCT 27.5*      *   MCH 32.7*   MCHC 32.4     CMP:   Recent Labs   Lab 03/21/24  0337   GLU 97   CALCIUM 9.1   *   K 4.2   CO2 28   CL 99   BUN 21   CREATININE 0.9       Significant Diagnostics:  I have reviewed all pertinent imaging results/findings within the past 24 hours.   Assessment/Plan:     Hyponatremia  BMP daily     Obesity (BMI 30-39.9)  PT OT     Hypophosphatemia  Replaced PO   Daily labs   Resolved     Acute blood loss anemia  Patient with baseline anemia compounded with expected post operative blood loss   CBC daily   (H/H 11.7/36 at pre-op visit)     Status post ligation of left atrial appendage  NSR with some PVCs on monitor   BB increased     S/P mitral valve repair  ASA  BB   Lasix 40 IV BID. Will give an extra 20 at lunch time   Potassium replacements   PT/OT   Bowel regimen   Pain regimen   Cardiac diet with 1500mL fluid restriction. Boost added   Chest tubes out   Pacer wires removed   Wean oxygen as tolerated. Goal >88%. Ok for patient to ambulate pending no dizziness and titrate oxygen as needed.  Can use platform walker.       Breathing treatments ordered. Changed Duo-neb to Xopenex " because patient says last night it made her jittery.      TTE with no MR     Transient hyperglycemia post procedure  Endocrine following     Hypothyroidism  Home synthroid     HLD (hyperlipidemia)  Home Zetia in place of statin     HTN (hypertension)  Hypotension when in ICU. Received fluids and pressure stable       GERD (gastroesophageal reflux disease)  Protonix     Heart failure with reduced ejection fraction  Wean oxygen as tolerated. Goal O2 saturation 88 and above   Continue Diuresis       Dispo: CSU. D.C when medically stable     Kirti Stone PA-C  Cardiothoracic Surgery  [facility] - Cardiology Stepdown

## 2024-03-21 NOTE — SUBJECTIVE & OBJECTIVE
Interval History: NAEON. Patient reports feeling ok this morning. Feels somewhat emotional because her  was hospitalized for a while and this brings back those memories. Discussed being patient with her body and taking shorter walks. Ok for her to use a platform walker. Responded well to diuresis yesterday. Will give an extra push of lasix today. Oxygen turned down to 1L. Patient reports feeling short winded when she doesn't have the oxygen. Both Dr. Bernal and myself explained the concept of fluid retention following surgery and the importance of ambulation as tolerated to help re-expand lungs. Patient is using her IS routinely. Will add Boost to her meals.     Review of Systems   Constitutional: Positive for malaise/fatigue.   Cardiovascular:  Positive for dyspnea on exertion. Negative for chest pain and palpitations.   Neurological:  Positive for weakness.     Medications:  Continuous Infusions:  Scheduled Meds:   acetaminophen  1,000 mg Oral Q8H    acetylcysteine 100 mg/ml (10%)  4 mL Nebulization TID WAKE    aspirin  325 mg Oral Daily    docusate sodium  100 mg Oral BID    ezetimibe  10 mg Oral QHS    furosemide (LASIX) injection  20 mg Intravenous Once    furosemide (LASIX) injection  40 mg Intravenous Q12H    levalbuterol  0.63 mg Nebulization Q12H    levothyroxine  150 mcg Oral Before breakfast    magnesium oxide  800 mg Oral Daily    metoprolol tartrate  50 mg Oral BID    pantoprazole  40 mg Oral Daily    polyethylene glycol  17 g Oral Daily    potassium chloride  10 mEq Oral BID    traZODone  100 mg Oral QHS     PRN Meds:bisacodyL, dextrose 10%, dextrose 10%, ondansetron, oxyCODONE, oxyCODONE, prochlorperazine, sodium chloride 0.9%     Objective:     Vital Signs (Most Recent):  Temp: 97.3 °F (36.3 °C) (03/21/24 0448)  Pulse: 89 (03/21/24 0725)  Resp: 20 (03/21/24 0725)  BP: 130/64 (03/21/24 0448)  SpO2: 95 % (03/21/24 0725) Vital Signs (24h Range):  Temp:  [97.3 °F (36.3 °C)-98.1 °F  (36.7 °C)] 97.3 °F (36.3 °C)  Pulse:  [] 89  Resp:  [17-22] 20  SpO2:  [90 %-100 %] 95 %  BP: (116-138)/(56-75) 130/64     Weight: 100.7 kg (222 lb 0.1 oz)  Body mass index is 35.83 kg/m².    SpO2: 95 %       Intake/Output - Last 3 Shifts         03/19 0700  03/20 0659 03/20 0700  03/21 0659 03/21 0700  03/22 0659    P.O. 820 1200     I.V. (mL/kg)       IV Piggyback       Total Intake(mL/kg) 820 (7.9) 1200 (11.7)     Urine (mL/kg/hr) 2250 (0.9) 3200 (1.3)     Stool 0      Total Output 2250 3200     Net -1430 -2000            Stool Occurrence 0 x              Lines/Drains/Airways       Drain  Duration             Female External Urinary Catheter w/ Suction 03/17/24 4 days              Line  Duration                  Pacer Wires 03/15/24 1151 5 days              Peripheral Intravenous Line  Duration                  Peripheral IV - Single Lumen 03/15/24 0805 16 G Right;Posterior Forearm 6 days                     Physical Exam  Vitals reviewed.   Constitutional:       General: She is not in acute distress.     Appearance: She is well-developed. She is not diaphoretic.   HENT:      Head: Normocephalic and atraumatic.   Eyes:      Pupils: Pupils are equal, round, and reactive to light.   Neck:      Vascular: No JVD.   Cardiovascular:      Rate and Rhythm: Normal rate and regular rhythm.      Comments: Midline sternal incision c/d/i  Pulmonary:      Effort: Pulmonary effort is normal. No respiratory distress.   Musculoskeletal:         General: Swelling (minimal) present. Normal range of motion.      Cervical back: Normal range of motion.   Skin:     Coloration: Skin is not pale.   Neurological:      Mental Status: She is alert. Mental status is at baseline.   Psychiatric:         Speech: Speech normal.         Behavior: Behavior normal.         Thought Content: Thought content normal.         Judgment: Judgment normal.        Significant Labs:  BMP:   Recent Labs   Lab 03/21/24  0337   GLU 97   *   K 4.2  "  CL 99   CO2 28   BUN 21   CREATININE 0.9   CALCIUM 9.1   MG 1.6     Cardiac markers: No results for input(s): "CKMB", "CPKMB", "TROPONINT", "TROPONINI", "MYOGLOBIN" in the last 48 hours.  CBC:   Recent Labs   Lab 03/21/24  0337   WBC 5.17   RBC 2.72*   HGB 8.9*   HCT 27.5*      *   MCH 32.7*   MCHC 32.4     CMP:   Recent Labs   Lab 03/21/24  0337   GLU 97   CALCIUM 9.1   *   K 4.2   CO2 28   CL 99   BUN 21   CREATININE 0.9       Significant Diagnostics:  I have reviewed all pertinent imaging results/findings within the past 24 hours.  "

## 2024-03-21 NOTE — PT/OT/SLP PROGRESS
Physical Therapy Treatment    Patient Name:  Mora Adrian   MRN:  [id number]  Admitting Diagnosis:  Nonrheumatic mitral valve regurgitation   Recent Surgery: Procedure(s) (LRB):  REPAIR, MITRAL VALVE, OPEN (N/A)  EXCLUSION, LEFT ATRIAL APPENDAGE, OPEN, AS PART OF OPEN CHEST SURGERY (N/A) 6 Days Post-Op  Admit Date: 3/15/2024  Length of Stay: 6 days    Recommendations:     Discharge Recommendations:  Low Intensity Therapy     Discharge Equipment Recommendations: none   Barriers to discharge: None    Appropriate transfer level with nursing staff: Ambulatory with SBA, monitor O2    Plan:     During this hospitalization, patient to be seen 5 x/week to address the identified rehab impairments via gait training, therapeutic activities, therapeutic exercises, neuromuscular re-education and progress towards the established goals.  Plan of Care Expires:  04/15/24  Plan of Care Reviewed with: patient, family    Assessment:     Mora Adrian is a 68 y.o. female admitted with a medical diagnosis of Nonrheumatic mitral valve regurgitation. Pt found upright in chair agreeable to therapy session. Pt found after recently transferring from bed to chair. Before mobility completed, pt found with SpO2 82% on 2 L NC so titrated to 4 L NC required to be >88%. Patient left on 4 L NC for mobility. Pt able to walk to sink, stand at sink ~5 minutes and complete toileting with SpO2 >90%. After ADLs, patient gait trained ~75' in hallway with SpO2 84% on 4 L NC after trial. Oxygen titrated to 5 L NC and pt quickly with SpO2 >88%. Pt ultimately left in chair with SpO2 94% on 3 L NC after rest break and deep breathing encouraged. Pt remains limited by decreased endurance cardiopulmonary response to activity. Patient would benefit from skilled therapy services to maximize safety and independence, increase activity tolerance, decrease fall risk, decrease caregiver burden, improve QOL, improve patient's functional mobility, and decrease risk  "of contractures and pressure sores.  Patient continues to demonstrate the need for low intensity therapy on a scheduled basis exhibited by decreased independence with functional mobility     Problem List: weakness, impaired endurance, impaired self care skills, impaired functional mobility, gait instability, decreased upper extremity function, decreased lower extremity function, decreased safety awareness, impaired cardiopulmonary response to activity, pain.  Rehab Prognosis: Good; patient would benefit from acute skilled PT services to address these deficits and reach maximum level of function.      Goals:   Multidisciplinary Problems       Physical Therapy Goals          Problem: Physical Therapy    Goal Priority Disciplines Outcome Goal Variances Interventions   Physical Therapy Goal     PT, PT/OT Ongoing, Progressing     Description: Goals to met by 3/30/2024    1. Sit to stand transfer with Stand-by Assistance  2. Bed to chair transfer with Stand-by Assistance using No Assistive Device  3. Gait  x 100 feet with Stand-by Assistance using No Assistive Device   4. Ascend/Descend 6 inch curb step with Contact Guard Assistance using No Assistive Device.  5. Stand for 5 minutes with Supervision using No Assistive Device  6. Lower extremity exercise program x15 reps per Instruction, with assistance as needed in order to facilitate improvement in functional independence                       Subjective     RN notified prior to session. Family present upon PT entrance into room. Patient agreeable to PT treatment session.    Chief Complaint: "I think I want to try to walk right now"  Patient/Family Comments/goals: go home  Pain/Comfort:  Pain Rating 1: other (see comments) (not rated)  Location - Side 1: Bilateral  Location - Orientation 1: generalized  Location 1: sternal (incision site)  Pain Addressed 1: Reposition, Distraction, Nurse notified  Pain Rating Post-Intervention 1: 8/10      Objective:     Patient found up " "in chair with: telemetry, oxygen   Cognition:   Alert and Cooperative  Patient is oriented to Person, Place, Time, Situation  General Precautions: Standard, Cardiac fall, sternal   Orthopedic Precautions:N/A   Braces: N/A   Body mass index is 35.83 kg/m².  Oxygen Device: Nasal Cannula 2L (oxygen ranged from 2L - 5L during session to keep SpO2 >88%)  Vitals: /64 (BP Location: Left arm, Patient Position: Sitting)   Pulse 84   Temp 98 °F (36.7 °C) (Oral)   Resp 19   Ht 5' 6" (1.676 m)   Wt 100.7 kg (222 lb 0.1 oz)   SpO2 (!) 93%   Breastfeeding No   BMI 35.83 kg/m²     Outcome Measures:  AM-PAC 6 CLICK MOBILITY  Turning over in bed (including adjusting bedclothes, sheets and blankets)?: 3  Sitting down on and standing up from a chair with arms (e.g., wheelchair, bedside commode, etc.): 2  Moving from lying on back to sitting on the side of the bed?: 3  Moving to and from a bed to a chair (including a wheelchair)?: 3  Need to walk in hospital room?: 3  Climbing 3-5 steps with a railing?: 2  Basic Mobility Total Score: 16     Functional Mobility:    Bed Mobility:   Pt found/returned to bedside chair    Transfers:   Sit <> Stand Transfer: moderate assistance with no assistive device from chair; minimum assistance with no AD from toilet    Balance:  Standing:  Static: stand by assistance ~5 minutes at sink to brush teeth, SpO2 >90% throughout  Dynamic: stand by assistance      Gait:  Patient ambulated: 10' to BR + 75' in young   Patient required: standy by assistance  Patient used:  no assistive device   Gait Deviation(s): occasional unsteady gait, decreased step length, flexed posture, decreased laine, and decreased arm swing  all lines remained intact throughout ambulation trial  Comments: Patient with SpO2 >90% after first trial and standing balance on 4 L NC but decreased to 84% after longer gait trial. Increased to >88% with increased to 5 L NC but ultimately left on 3 L NC with SpO2 " 94%    Education:  Time provided for education, counseling and discussion of health disposition in regards to patient's current status  All questions answered within PT scope of practice and to patient's satisfaction  PT role in POC to address current functional deficits  Pt educated on proper body mechanics, safety techniques, and energy conservation with PT facilitation and cueing throughout session  Call nursing/pct to transfer to chair/use bathroom. Pt stated understanding.  Pt educated on bed mobility techniques with sternal precautions    Patient left up in chair with all lines intact, call button in reach, RN and PA notified, and family present.    Time Tracking:     PT Received On: 03/21/24  PT Start Time: 1014     PT Stop Time: 1041  PT Total Time (min): 27 min     Billable Minutes:   Gait Training 12 minutes and Therapeutic Activity 15 minutes    Treatment Type: Treatment  PT/PTA: PT       3/21/2024

## 2024-03-21 NOTE — PT/OT/SLP PROGRESS
Occupational Therapy   Treatment    Name: Mora Adrian  MRN: [id number]  Admitting Diagnosis:  Nonrheumatic mitral valve regurgitation  6 Days Post-Op    Recommendations:     Discharge Recommendations: Low Intensity Therapy  Discharge Equipment Recommendations:  none  Barriers to discharge:  None    Assessment:     Mora Adrian is a 68 y.o. female with a medical diagnosis of Nonrheumatic mitral valve regurgitation.  Pt presents with decreased endurance and impaired mobility performance as limited by cardiovascular status and generalized weakness. Pt found upright in chair and agreeable for therapy. Session focused on functional mobility in room and into restroom for toileting and standing grooming at sink. Pt able to recall 3/3 sternal precautions. Pt primarily with mild complaint of SOB with Sp02 on 3L upon arrival at 87-88% Sp02 prior to all mobility. Pt desaturated to 83-84% Sp02 needing to be increased to 6L after toileting/ADLs. Pt ultimately decreased back to 3L at 87-88% Sp02 back in chair.  Patient continues to demonstrate the need for low intensity therapy on a scheduled basis exhibited by decreased independence with self-care and functional mobility  Performance deficits affecting function are weakness, gait instability, impaired endurance, impaired balance, impaired self care skills, impaired functional mobility, decreased upper extremity function, decreased lower extremity function, impaired cardiopulmonary response to activity.     Rehab Prognosis:  Good; patient would benefit from acute skilled OT services to address these deficits and reach maximum level of function.       Plan:     Patient to be seen 5 x/week to address the above listed problems via self-care/home management, therapeutic activities, neuromuscular re-education  Plan of Care Expires: 04/15/24  Plan of Care Reviewed with: patient, family    Subjective     Chief Complaint: mild SANCHEZ after toileting therefore further mobility  declined  Patient/Family Comments/goals: improve mobility  Pain/Comfort:  Pain Rating 1: 0/10  Pain Rating Post-Intervention 1: 0/10    Objective:     Communicated with: RN  prior to session.  Patient found up in chair with telemetry, oxygen upon OT entry to room.    General Precautions: Standard, fall, sternal    Orthopedic Precautions:N/A  Braces: N/A  Respiratory Status: Nasal cannula, flow 3 L/min     Occupational Performance:     Bed Mobility:    NT as  pt in chair     Functional Mobility/Transfers:  Patient completed Sit <> Stand Transfer with minimum assistance  with  hand-held assist   Patient completed Toilet Transfer Step Transfer technique with minimum assistance with  hand-held assist  Functional Mobility: Pt completed all t/f trials today with min A from chair and toilet. Once in standing, pt tolerated 1 minute stand with CGA using no AD holding heart pillow. Pt desaturated (3L needing to be increased to 4L). Pt mobilized to bathroom for ADLs at sink after toileting needing to be increased to 6L.  Pt sat back to chair with Sp02 decreased to 88% on 3L.  ALEX Lamb notified of pt's performance with therapy following session for pt safety.     Activities of Daily Living:  Grooming: stand by assistance washing hands and cleaning hair upright at sink  Upper Body Dressing: minimum assistance donning gown in chair   Toileting: total assistance with replacement of purewick; pt urinating in standard toilet with hat      [facility] 6 Click ADL: 16    Treatment & Education:  Pt educated on role of occupational therapy, POC, and safety during ADLs and functional mobility. Pt and OT discussed importance of safe, continued mobility to optimize daily living skills. Pt verbalized understanding.     White board updated during session. Pt given instruction to call for medical staff/nurse for assistance.       Patient left up in chair with all lines intact, call button in reach, and RN notified ; pt's daughter     GOALS:    Multidisciplinary Problems       Occupational Therapy Goals          Problem: Occupational Therapy    Goal Priority Disciplines Outcome Interventions   Occupational Therapy Goal     OT, PT/OT Ongoing, Progressing    Description: Goals to be met by: 03-30-24     Patient will increase functional independence with ADLs by performing:    UE Dressing with Nolan.  LE Dressing with Supervision.  Grooming while standing at sink with Supervision.  Toileting from toilet with Supervision for hygiene and clothing management.   Supine to sit with Supervision.  Stand pivot transfers with Supervision.  Toilet transfer to toilet with Supervision.  Pt. To recall sternal precautions with 100% accuracy                         Time Tracking:     OT Date of Treatment: 03/21/24  OT Start Time: 1330  OT Stop Time: 1354  OT Total Time (min): 24 min    Billable Minutes:Self Care/Home Management 12 min  Therapeutic Activity 12 min    OT/HERMAN: OT          3/21/2024

## 2024-03-21 NOTE — ASSESSMENT & PLAN NOTE
Nursing Update:  Pt being transferred to  from  per MD order. Transfer report given to Radha BLANKENSHIP RN.  Portable telemetry on and patient to be monitor by Tele Tech. Patient will be transfer by wheelchair with RN and CNA. Patient belonging sent with. Assessment completed with Radha BLANKENSHIP RN. RN who accepts accountability for pt on 9ST.         Wean oxygen as tolerated. Goal O2 saturation 88 and above   Continue Diuresis

## 2024-03-21 NOTE — PHYSICIAN QUERY
PT Name: Mora Adrian  MR #: [id number]     DOCUMENTATION CLARIFICATION     CDS/: Elsie Vazquez RN              Contact information: [email or web address]    This form is a permanent document in the medical record.     Query Date: March 21, 2024    By submitting this query, we are merely seeking further clarification of documentation.  Please utilize your independent clinical judgment when addressing the question(s) below.    The Medical Record contains the following   Indicators Supporting Clinical Findings Location in Medical Record   x Heart Failure documented   Heart failure with reduced ejection fraction  - Restart BB when clinically appropriate      POSTOPERATIVE DIAGNOSES:    1.  Severe mitral regurgitation.  2.  Hypertension  3.  Hyperlipidemia  4.  Hypothyroidism  5.  Chronic diastolic heart failure        Heart failure with reduced ejection fraction  Wean oxygen as tolerated  Continue Diuresis     H&P 3/15      Critical Care      Op Note 3/15      CTS                PN 3/19       CTS    BNP     x EF/Echo   Left Ventricle: The left ventricle is mildly dilated. Normal wall thickness. Global hypokinesis present. There is severely reduced systolic function with a visually estimated ejection fraction of 25 - 30%.    Right Ventricle: Normal right ventricular cavity size. Wall thickness is normal. Right ventricle wall motion  is normal. Systolic function is mildly reduced. Pacemaker lead present in the ventricle.    Mitral Valve: The mitral valve is repaired with Mary Jo stitch and [employer] Future band annuloplasty.    Pulmonary Artery: The estimated pulmonary artery systolic pressure is 29 mmHg.    IVC/SVC: Normal venous pressure at 3 mmHg.   Echo 3/19   x Radiology findings CXR from last night is stable with some expected fluid. Lasix changed to IV as BP has been stable. Metop increased to 50 for elevated heart rate.   PN 3/20       CTS   x Subjective/Objective Respiratory Conditions Positive for  dyspnea on exertion. PN 3/19       CTS    Recent/Current MI      Heart Transplant, LVAD     x Edema, JVD   Right lower leg: Edema present.     Left lower leg: Edema present. PN 3/19       CTS    Ascites     x Diuretics/Meds Furosemide 20 mg IV   Furosemide 40 mg IV     Furosemide 20 mg oral MAR 3/21  MAR 3/17, 3/18, 3/20- present  MAR 3/19    Other Treatment      Other       Heart failure is a clinical diagnosis which includes symptomatic fluid retention, elevated intracardiac pressures, and/or the inability of the heart to deliver adequate blood flow.    Heart Failure with reduced Ejection Fraction (HFrEF) or Systolic Heart Failure (loses ability to contract normally, EF is <40%)    Heart Failure with preserved Ejection Fraction (HFpEF) or Diastolic Heart Failure (stiff ventricles, does not relax properly, EF is >50%)     Heart Failure with Combined Systolic and Diastolic Failure (stiff ventricles, does not relax properly and EF is <50%)    Mid-range or mildly reduced ejection fraction (HFmrEF) is classified as systolic heart failure.  Congestive heart failure with a recovered EF is classified as Diastolic Heart Failure.  Common clues to acute exacerbation:  Rapidly progressive symptoms (w/in 2 weeks of presentation), using IV diuretics, using supplemental O2, pulmonary edema on Xray, new or worsening pleural effusion, +JVD or other signs of volume overload, MI w/in 4 weeks, and/or BNP >500  The clinical guidelines noted are only system guidelines, and do not replace the providers clinical judgment.    Provider, please clarify the type and acuity of the Congestive Heart Failure diagnosis associated with the above clinical findings.    [   ]  Acute on Chronic Systolic Heart Failure (HFrEF ) - worsening of CHF signs/symptoms in preexisting CHF   [   ]  Chronic Systolic Heart Failure (HFrEF or HFmrEF) - preexisting and stable   [ X  ]  Chronic Diastolic Heart Failure (HFpEF) - preexisting and stable   [   ]  Other  "(please specify): ___________________________________       2. If Acute on Chronic ruled in, please clarify the Present on admission (POA) status for the "Acute " diagnosis:  [   ]  Yes (Y)   [   ]  No (N)   [   ]  Documentation insufficient to determine if condition is POA (U)   [  ]  Clinically Undetermined (W)     Please document in your progress notes daily for the duration of treatment until resolved and include in your discharge summary.    References:  American Heart Association editorial staff. (2017, May). Ejection Fraction Heart Failure Measurement. American Heart Association. https://www.heart.org/en/health-topics/heart-failure/diagnosing-heart-failure/ejection-fraction-heart-failure-measurement#:~:text=Ejection%20fraction%20(EF)%20is%20a,pushed%20out%20with%20each%20heartbeat  VERONIKA Sotomayor (2020, December 15). Heart failure with preserved ejection fraction: Clinical manifestations and diagnosis. [employer]. https://www.[employer].com/contents/heart-failure-with-preserved-ejection-fraction-clinical-manifestations-and-diagnosis.  ICD-10-CM/PCS Coding Clinic Third Quarter ICD-10, Effective with discharges: September 8, 2020 [facility] § Heart failure with mid-range or mildly reduced ejection fraction (2020).  ICD-10-CM/PCS Coding Clinic Third Quarter ICD-10, Effective with discharges: September 8, 2020 [facility] § Heart failure with recovered ejection fraction (2020).  Form No. 09017      "

## 2024-03-21 NOTE — PLAN OF CARE
Pt slept well this shift. A&Ox4. VSS. Medicated for pain per MAR. 2 L NC in place. Purewick in place. Safety precautions in place. Call light in reach. No further concerns noted at this time.    Problem: Adult Inpatient Plan of Care  Goal: Plan of Care Review  Outcome: Ongoing, Progressing  Goal: Patient-Specific Goal (Individualized)  Outcome: Ongoing, Progressing  Goal: Absence of Hospital-Acquired Illness or Injury  Outcome: Ongoing, Progressing  Goal: Optimal Comfort and Wellbeing  Outcome: Ongoing, Progressing  Goal: Readiness for Transition of Care  Outcome: Ongoing, Progressing     Problem: Activity Intolerance (Cardiovascular Surgery)  Goal: Improved Activity Tolerance  Outcome: Ongoing, Progressing     Problem: Adjustment to Surgery (Cardiovascular Surgery)  Goal: Optimal Coping with Heart Surgery  Outcome: Ongoing, Progressing     Problem: Bleeding (Cardiovascular Surgery)  Goal: Bleeding (Cardiovascular Surgery)  Outcome: Ongoing, Progressing     Problem: Bowel Motility Impaired (Cardiovascular Surgery)  Goal: Effective Bowel Elimination (Cardiovascular Surgery)  Outcome: Ongoing, Progressing     Problem: Cardiac Function Impaired (Cardiovascular Surgery)  Goal: Effective Cardiac Function  Outcome: Ongoing, Progressing     Problem: Cerebral Tissue Perfusion (Cardiovascular Surgery)  Goal: Optimal Cerebral Tissue Perfusion (Cardiovascular Surgery)  Outcome: Ongoing, Progressing     Problem: Fluid and Electrolyte Imbalance (Cardiovascular Surgery)  Goal: Fluid and Electrolyte Balance (Cardiovascular Surgery)  Outcome: Ongoing, Progressing     Problem: Glycemic Control Impaired (Cardiovascular Surgery)  Goal: Blood Glucose Level Within Targeted Range (Cardiovascular Surgery)  Outcome: Ongoing, Progressing     Problem: Infection (Cardiovascular Surgery)  Goal: Absence of Infection Signs and Symptoms  Outcome: Ongoing, Progressing     Problem: Ongoing Anesthesia Effects (Cardiovascular Surgery)  Goal:  Anesthesia/Sedation Recovery  Outcome: Ongoing, Progressing     Problem: Pain (Cardiovascular Surgery)  Goal: Acceptable Pain Control  Outcome: Ongoing, Progressing     Problem: Postoperative Nausea and Vomiting (Cardiovascular Surgery)  Goal: Nausea and Vomiting Relief (Cardiovascular Surgery)  Outcome: Ongoing, Progressing     Problem: Postoperative Urinary Retention (Cardiovascular Surgery)  Goal: Effective Urinary Elimination (Cardiovascular Surgery)  Outcome: Ongoing, Progressing     Problem: Respiratory Compromise (Cardiovascular Surgery)  Goal: Effective Oxygenation and Ventilation (Cardiovascular Surgery)  Outcome: Ongoing, Progressing     Problem: Infection  Goal: Absence of Infection Signs and Symptoms  Outcome: Ongoing, Progressing     Problem: Skin Injury Risk Increased  Goal: Skin Health and Integrity  Outcome: Ongoing, Progressing     Problem: Fall Injury Risk  Goal: Absence of Fall and Fall-Related Injury  Outcome: Ongoing, Progressing

## 2024-03-22 LAB
ANION GAP SERPL CALC-SCNC: 12 MMOL/L (ref 8–16)
BASOPHILS # BLD AUTO: 0.04 K/UL (ref 0–0.2)
BASOPHILS NFR BLD: 0.7 % (ref 0–1.9)
BUN SERPL-MCNC: 25 MG/DL (ref 8–23)
CALCIUM SERPL-MCNC: 9.3 MG/DL (ref 8.7–10.5)
CHLORIDE SERPL-SCNC: 96 MMOL/L (ref 95–110)
CO2 SERPL-SCNC: 30 MMOL/L (ref 23–29)
CREAT SERPL-MCNC: 1 MG/DL (ref 0.5–1.4)
DIFFERENTIAL METHOD BLD: ABNORMAL
EOSINOPHIL # BLD AUTO: 0.2 K/UL (ref 0–0.5)
EOSINOPHIL NFR BLD: 3.8 % (ref 0–8)
ERYTHROCYTE [DISTWIDTH] IN BLOOD BY AUTOMATED COUNT: 13.2 % (ref 11.5–14.5)
EST. GFR  (NO RACE VARIABLE): >60 ML/MIN/1.73 M^2
GLUCOSE SERPL-MCNC: 96 MG/DL (ref 70–110)
HCT VFR BLD AUTO: 29.6 % (ref 37–48.5)
HGB BLD-MCNC: 9.5 G/DL (ref 12–16)
IMM GRANULOCYTES # BLD AUTO: 0.04 K/UL (ref 0–0.04)
IMM GRANULOCYTES NFR BLD AUTO: 0.7 % (ref 0–0.5)
LYMPHOCYTES # BLD AUTO: 1.8 K/UL (ref 1–4.8)
LYMPHOCYTES NFR BLD: 32.7 % (ref 18–48)
MAGNESIUM SERPL-MCNC: 1.7 MG/DL (ref 1.6–2.6)
MCH RBC QN AUTO: 32.6 PG (ref 27–31)
MCHC RBC AUTO-ENTMCNC: 32.1 G/DL (ref 32–36)
MCV RBC AUTO: 102 FL (ref 82–98)
MONOCYTES # BLD AUTO: 0.6 K/UL (ref 0.3–1)
MONOCYTES NFR BLD: 11.2 % (ref 4–15)
NEUTROPHILS # BLD AUTO: 2.8 K/UL (ref 1.8–7.7)
NEUTROPHILS NFR BLD: 50.9 % (ref 38–73)
NRBC BLD-RTO: 0 /100 WBC
PHOSPHATE SERPL-MCNC: 3.6 MG/DL (ref 2.7–4.5)
PLATELET # BLD AUTO: 172 K/UL (ref 150–450)
PMV BLD AUTO: 10.1 FL (ref 9.2–12.9)
POTASSIUM SERPL-SCNC: 3.9 MMOL/L (ref 3.5–5.1)
RBC # BLD AUTO: 2.91 M/UL (ref 4–5.4)
SODIUM SERPL-SCNC: 138 MMOL/L (ref 136–145)
WBC # BLD AUTO: 5.56 K/UL (ref 3.9–12.7)

## 2024-03-22 PROCEDURE — 25000003 PHARM REV CODE 250: Performed by: ANESTHESIOLOGY

## 2024-03-22 PROCEDURE — 94761 N-INVAS EAR/PLS OXIMETRY MLT: CPT

## 2024-03-22 PROCEDURE — 27000221 HC OXYGEN, UP TO 24 HOURS

## 2024-03-22 PROCEDURE — 20600001 HC STEP DOWN PRIVATE ROOM

## 2024-03-22 PROCEDURE — 85025 COMPLETE CBC W/AUTO DIFF WBC: CPT | Performed by: STUDENT IN AN ORGANIZED HEALTH CARE EDUCATION/TRAINING PROGRAM

## 2024-03-22 PROCEDURE — 83735 ASSAY OF MAGNESIUM: CPT | Performed by: STUDENT IN AN ORGANIZED HEALTH CARE EDUCATION/TRAINING PROGRAM

## 2024-03-22 PROCEDURE — 25000003 PHARM REV CODE 250: Performed by: STUDENT IN AN ORGANIZED HEALTH CARE EDUCATION/TRAINING PROGRAM

## 2024-03-22 PROCEDURE — 97530 THERAPEUTIC ACTIVITIES: CPT

## 2024-03-22 PROCEDURE — 84100 ASSAY OF PHOSPHORUS: CPT | Performed by: STUDENT IN AN ORGANIZED HEALTH CARE EDUCATION/TRAINING PROGRAM

## 2024-03-22 PROCEDURE — 97535 SELF CARE MNGMENT TRAINING: CPT

## 2024-03-22 PROCEDURE — 80048 BASIC METABOLIC PNL TOTAL CA: CPT | Performed by: PHYSICIAN ASSISTANT

## 2024-03-22 PROCEDURE — 94640 AIRWAY INHALATION TREATMENT: CPT

## 2024-03-22 PROCEDURE — 25000003 PHARM REV CODE 250

## 2024-03-22 PROCEDURE — 99900035 HC TECH TIME PER 15 MIN (STAT)

## 2024-03-22 PROCEDURE — 97116 GAIT TRAINING THERAPY: CPT

## 2024-03-22 PROCEDURE — 25000242 PHARM REV CODE 250 ALT 637 W/ HCPCS: Performed by: PHYSICIAN ASSISTANT

## 2024-03-22 PROCEDURE — 25000003 PHARM REV CODE 250: Performed by: PHYSICIAN ASSISTANT

## 2024-03-22 PROCEDURE — 63600175 PHARM REV CODE 636 W HCPCS: Performed by: PHYSICIAN ASSISTANT

## 2024-03-22 RX ORDER — ESTRADIOL 0.05 MG/D
1 FILM, EXTENDED RELEASE TRANSDERMAL
Status: DISCONTINUED | OUTPATIENT
Start: 2024-03-22 | End: 2024-03-24

## 2024-03-22 RX ORDER — ESTRADIOL 0.05 MG/D
1 FILM, EXTENDED RELEASE TRANSDERMAL
Status: DISCONTINUED | OUTPATIENT
Start: 2024-03-22 | End: 2024-03-22 | Stop reason: SDUPTHER

## 2024-03-22 RX ADMIN — EZETIMIBE 10 MG: 10 TABLET ORAL at 08:03

## 2024-03-22 RX ADMIN — FUROSEMIDE 40 MG: 10 INJECTION, SOLUTION INTRAVENOUS at 08:03

## 2024-03-22 RX ADMIN — POTASSIUM CHLORIDE 10 MEQ: 10 CAPSULE, COATED, EXTENDED RELEASE ORAL at 08:03

## 2024-03-22 RX ADMIN — POLYETHYLENE GLYCOL 3350 17 G: 17 POWDER, FOR SOLUTION ORAL at 08:03

## 2024-03-22 RX ADMIN — ASPIRIN 325 MG: 325 TABLET, COATED ORAL at 08:03

## 2024-03-22 RX ADMIN — METOPROLOL TARTRATE 50 MG: 50 TABLET, FILM COATED ORAL at 08:03

## 2024-03-22 RX ADMIN — TRAZODONE HYDROCHLORIDE 100 MG: 100 TABLET ORAL at 08:03

## 2024-03-22 RX ADMIN — OXYCODONE 5 MG: 5 TABLET ORAL at 04:03

## 2024-03-22 RX ADMIN — Medication 800 MG: at 08:03

## 2024-03-22 RX ADMIN — OXYCODONE HYDROCHLORIDE 10 MG: 10 TABLET ORAL at 03:03

## 2024-03-22 RX ADMIN — LEVALBUTEROL HYDROCHLORIDE 0.63 MG: 0.63 SOLUTION RESPIRATORY (INHALATION) at 10:03

## 2024-03-22 RX ADMIN — ACETAMINOPHEN 1000 MG: 500 TABLET ORAL at 05:03

## 2024-03-22 RX ADMIN — METHOCARBAMOL 500 MG: 500 TABLET ORAL at 08:03

## 2024-03-22 RX ADMIN — DOCUSATE SODIUM 100 MG: 100 CAPSULE, LIQUID FILLED ORAL at 08:03

## 2024-03-22 RX ADMIN — PANTOPRAZOLE SODIUM 40 MG: 40 TABLET, DELAYED RELEASE ORAL at 08:03

## 2024-03-22 RX ADMIN — METHOCARBAMOL 500 MG: 500 TABLET ORAL at 02:03

## 2024-03-22 RX ADMIN — LEVALBUTEROL HYDROCHLORIDE 0.63 MG: 0.63 SOLUTION RESPIRATORY (INHALATION) at 08:03

## 2024-03-22 RX ADMIN — LEVOTHYROXINE SODIUM 150 MCG: 150 TABLET ORAL at 05:03

## 2024-03-22 RX ADMIN — ACETAMINOPHEN 1000 MG: 500 TABLET ORAL at 12:03

## 2024-03-22 NOTE — PT/OT/SLP PROGRESS
Occupational Therapy   Treatment    Name: Mora Adrian  MRN: [id number]  Admitting Diagnosis:  Nonrheumatic mitral valve regurgitation  7 Days Post-Op    Recommendations:     Discharge Recommendations: Low Intensity Therapy  Discharge Equipment Recommendations:  shower chair  Barriers to discharge:  None    Assessment:     Mora Adrian is a 68 y.o. female with a medical diagnosis of Nonrheumatic mitral valve regurgitation.  She presents with the following performance deficits affecting function are weakness, impaired endurance, impaired self care skills, impaired functional mobility, gait instability, impaired balance, decreased safety awareness, impaired cardiopulmonary response to activity. Pt tolerated session well with O2 remaining >90% throughout. OT session focused on energy conservation strategies to improve functional performance of daily activities. Cues for pursed lip breathing and standing rest breaks. Pt would continue to benefit from skilled OT services to maximize functional independence with ADLs and functional mobility, reduce caregiver burden, and facilitate safe discharge in the least restrictive environment.  Patient continues to demonstrate the need for low intensity therapy on a scheduled basis exhibited by decreased independence with self-care and functional mobility     Rehab Prognosis:  Good; patient would benefit from acute skilled OT services to address these deficits and reach maximum level of function.       Plan:     Patient to be seen 5 x/week to address the above listed problems via self-care/home management, therapeutic activities, therapeutic exercises, neuromuscular re-education  Plan of Care Expires: 04/15/24  Plan of Care Reviewed with: patient, family    Subjective     Chief Complaint: B LE edema  Patient/Family Comments/goals: to d/c home  Pain/Comfort:  Pain Rating 1: 0/10  Pain Rating Post-Intervention 1: 0/10    Objective:     Communicated with: RN prior to session.   Patient found up in chair with oxygen, telemetry upon OT entry to room.    General Precautions: Standard, fall, sternal    Orthopedic Precautions:N/A  Braces: N/A  Respiratory Status: Nasal cannula, flow 0.5 L/min     Occupational Performance:     Bed Mobility:    Not assessed- pt sitting in chair and returned to chair end of session    Functional Mobility/Transfers:  Patient completed Sit <> Stand Transfer with contact guard assistance and minimum assistance  with  no assistive device   Min (A) from bedside chair  CGA from toilet  Patient completed Toilet Transfer Step Transfer technique with stand by assistance with  no AD and hand-held assist  Functional Mobility: Pt engaged in functional mobility to simulate household/community distances 10 ft x6 trials with  in order to maximize functional endurance and standing balance required for engagement in occupations of choice   4 standing rest breaks with cues for pursed lip breathing  O2 remained >90%    Activities of Daily Living:  Upper Body Dressing: moderate assistance to don hospital gown over back  Toileting: stand by assistance for seated pericare      [facility] 6 Click ADL: 20    Treatment & Education:  -Sternal precautions and application to functional tasks / ADLs reviewed:  including no lifting > 5 lbs, pulling or pushing with B UEs; Modifying daily activities and functional mobility tasks to maintain sternal precautions appropriately; Importance of participation in therapy and engaging in increased OOB mobility with assistance to improve endurance   -Provided education regarding role of OT, POC, & discharge recommendations with pt and family verbalizing understanding.  Pt had no further questions & when asked whether there were any concerns pt reported none.     Patient left up in chair with all lines intact, call button in reach, RN notified, and family present    GOALS:   Multidisciplinary Problems       Occupational Therapy Goals          Problem:  Occupational Therapy    Goal Priority Disciplines Outcome Interventions   Occupational Therapy Goal     OT, PT/OT Ongoing, Progressing    Description: Goals to be met by: 03-30-24     Patient will increase functional independence with ADLs by performing:    UE Dressing with Jewell.  LE Dressing with Supervision.  Grooming while standing at sink with Supervision.  Toileting from toilet with Supervision for hygiene and clothing management.   Supine to sit with Supervision.  Stand pivot transfers with Supervision.  Toilet transfer to toilet with Supervision.  Pt. To recall sternal precautions with 100% accuracy                         Time Tracking:     OT Date of Treatment: 03/22/24  OT Start Time: 1114  OT Stop Time: 1144  OT Total Time (min): 30 min    Billable Minutes:Self Care/Home Management 10  Therapeutic Activity 20    OT/HERMAN: OT          3/22/2024

## 2024-03-22 NOTE — PROGRESS NOTES
[facility] - Cardiology Stepdown  Cardiothoracic Surgery  Progress Note    Patient Name: Mora Adrian  MRN: [id number]  Admission Date: 3/15/2024  Hospital Length of Stay: 7 days  Code Status: Full Code   Attending Physician: Pedrito Bernal MD   Referring Provider: Pedrito Bernal MD  Principal Problem:Nonrheumatic mitral valve regurgitation        Subjective:     Post-Op Info:  Procedure(s) (LRB):  REPAIR, MITRAL VALVE, OPEN (N/A)  EXCLUSION, LEFT ATRIAL APPENDAGE, OPEN, AS PART OF OPEN CHEST SURGERY (N/A)   7 Days Post-Op     Interval History: NAEON. Encouraged patient to take small walks. Get up to use the bathroom instead of purewick. CXR from today looks good. Labs stable. Continue to wean oxygen as tolerated.     Review of Systems   Constitutional: Positive for malaise/fatigue.   Cardiovascular:  Positive for dyspnea on exertion. Negative for chest pain.   Respiratory:  Positive for shortness of breath.    Neurological:  Positive for weakness.   Medications:  Continuous Infusions:  Scheduled Meds:   acetaminophen  1,000 mg Oral Q6H    aspirin  325 mg Oral Daily    docusate sodium  100 mg Oral BID    ezetimibe  10 mg Oral QHS    furosemide (LASIX) injection  40 mg Intravenous Q12H    levalbuterol  0.63 mg Nebulization Q12H    levothyroxine  150 mcg Oral Before breakfast    magnesium oxide  800 mg Oral Daily    methocarbamoL  500 mg Oral TID    metoprolol tartrate  50 mg Oral BID    pantoprazole  40 mg Oral Daily    polyethylene glycol  17 g Oral Daily    potassium chloride  10 mEq Oral BID    traZODone  100 mg Oral QHS     PRN Meds:bisacodyL, dextrose 10%, dextrose 10%, ondansetron, oxyCODONE, oxyCODONE, prochlorperazine, sodium chloride 0.9%     Objective:     Vital Signs (Most Recent):  Temp: 97.5 °F (36.4 °C) (03/22/24 0833)  Pulse: 82 (03/22/24 0833)  Resp: 19 (03/22/24 0833)  BP: 129/61 (03/22/24 0833)  SpO2: (!) 94 % (03/22/24 0833) Vital Signs (24h Range):  Temp:  [97.5 °F (36.4 °C)-98.5 °F  (36.9 °C)] 97.5 °F (36.4 °C)  Pulse:  [74-92] 82  Resp:  [16-19] 19  SpO2:  [88 %-94 %] 94 %  BP: (117-133)/(56-64) 129/61     Weight: 100.7 kg (222 lb 0.1 oz)  Body mass index is 35.83 kg/m².    SpO2: (!) 94 %       Intake/Output - Last 3 Shifts         03/20 0700  03/21 0659 03/21 0700 03/22 0659 03/22 0700  03/23 0659    P.O. 1200 1080     Total Intake(mL/kg) 1200 (11.7) 1080 (10.7)     Urine (mL/kg/hr) 3200 (1.3) 2200 (0.9)     Stool       Total Output 3200 2200     Net -2000 -1120                    Lines/Drains/Airways       Drain  Duration             Female External Urinary Catheter w/ Suction 03/17/24 5 days              Peripheral Intravenous Line  Duration                  Peripheral IV - Single Lumen 03/15/24 0805 16 G Right;Posterior Forearm 7 days                     Physical Exam  Vitals reviewed.   Constitutional:       General: She is not in acute distress.     Appearance: She is well-developed. She is not diaphoretic.   HENT:      Head: Normocephalic and atraumatic.   Eyes:      Pupils: Pupils are equal, round, and reactive to light.   Neck:      Vascular: No JVD.   Cardiovascular:      Rate and Rhythm: Normal rate and regular rhythm.      Comments: Midline sternal incision c/d/i  Pulmonary:      Effort: Pulmonary effort is normal. No respiratory distress.      Comments: 3L NC  Abdominal:      General: There is no distension.   Musculoskeletal:         General: Swelling (improving lower extremity) present. Normal range of motion.      Cervical back: Normal range of motion.   Skin:     Coloration: Skin is not pale.   Neurological:      Mental Status: She is alert. Mental status is at baseline.   Psychiatric:         Speech: Speech normal.         Behavior: Behavior normal.         Thought Content: Thought content normal.         Judgment: Judgment normal.        Significant Labs:  BMP:   Recent Labs   Lab 03/22/24  0343   GLU 96      K 3.9   CL 96   CO2 30*   BUN 25*   CREATININE 1.0  "  CALCIUM 9.3   MG 1.7     Cardiac markers: No results for input(s): "CKMB", "CPKMB", "TROPONINT", "TROPONINI", "MYOGLOBIN" in the last 48 hours.  CBC:   Recent Labs   Lab 03/22/24  0343   WBC 5.56   RBC 2.91*   HGB 9.5*   HCT 29.6*      *   MCH 32.6*   MCHC 32.1       Significant Diagnostics:  I have reviewed all pertinent imaging results/findings within the past 24 hours.   Assessment/Plan:     Hyponatremia  BMP daily     Obesity (BMI 30-39.9)  PT OT     Hypophosphatemia  Replaced PO   Daily labs   Resolved     Acute blood loss anemia  Patient with baseline anemia compounded with expected post operative blood loss   CBC daily   (H/H 11.7/36 at pre-op visit)     Status post ligation of left atrial appendage  NSR with some PVCs on monitor   BB    S/P mitral valve repair  ASA  BB   Lasix 40 IV BID  Potassium replacements   PT/OT   Bowel regimen   Pain regimen   Cardiac diet with 1500mL fluid restriction. Boost    Tubes and wires out   Wean oxygen as tolerated. Goal >88%. Ok for patient to ambulate pending no dizziness and titrate oxygen as needed.  Can use platform walker.       Breathing treatments ordered.      TTE with no MR     Transient hyperglycemia post procedure  Endocrine following     Hypothyroidism  Home synthroid     HLD (hyperlipidemia)  Home Zetia in place of statin     HTN (hypertension)  Hypotension when in ICU. Received fluids and pressure stable       GERD (gastroesophageal reflux disease)  Protonix     Heart failure with reduced ejection fraction  Wean oxygen as tolerated. Goal O2 saturation 88 and above   Continue Diuresis       Dispo: CSU. D/C when medically stable     Kirti Stone PA-C  Cardiothoracic Surgery  [facility] - [facility] Step[facility]  "

## 2024-03-22 NOTE — PT/OT/SLP PROGRESS
Physical Therapy Treatment    Patient Name:  Mora Adrian   MRN:  [id number]  Admitting Diagnosis:  Nonrheumatic mitral valve regurgitation   Recent Surgery: Procedure(s) (LRB):  REPAIR, MITRAL VALVE, OPEN (N/A)  EXCLUSION, LEFT ATRIAL APPENDAGE, OPEN, AS PART OF OPEN CHEST SURGERY (N/A) 7 Days Post-Op  Admit Date: 3/15/2024  Length of Stay: 7 days    Recommendations:     Discharge Recommendations:  Low Intensity Therapy    Discharge Equipment Recommendations: shower chair   Barriers to discharge: None    Appropriate transfer level with nursing staff: Ambulatory with SBA    Plan:     During this hospitalization, patient to be seen 5 x/week to address the identified rehab impairments via gait training, therapeutic activities, therapeutic exercises, neuromuscular re-education and progress towards the established goals.  Plan of Care Expires:  04/15/24  Plan of Care Reviewed with: patient, family    Assessment:     Mora Adrian is a 68 y.o. female admitted with a medical diagnosis of Nonrheumatic mitral valve regurgitation. Pt agreeable to therapy session. Pt demo'd improvements this session as she increased distance gait trained. Pt with SpO2 87% initially after gait trial on 3 L NC but quickly increased to >90% with seated rest break and deep breathing. Patient would benefit from continued therapy services to increase pt's endurance and activity tolerance in preparation for d/c home. Patient would benefit from skilled therapy services to maximize safety and independence, increase activity tolerance, decrease fall risk, decrease caregiver burden, improve QOL, improve patient's functional mobility, and decrease risk of contractures and pressure sores.  Patient continues to demonstrate the need for low intensity therapy on a scheduled basis exhibited by decreased independence with functional mobility    Problem List: weakness, impaired endurance, impaired self care skills, impaired functional mobility, gait  "instability, decreased upper extremity function, decreased lower extremity function, impaired balance, impaired cardiopulmonary response to activity, impaired skin.  Rehab Prognosis: Good; patient would benefit from acute skilled PT services to address these deficits and reach maximum level of function.      Goals:   Multidisciplinary Problems       Physical Therapy Goals          Problem: Physical Therapy    Goal Priority Disciplines Outcome Goal Variances Interventions   Physical Therapy Goal     PT, PT/OT Ongoing, Progressing     Description: Goals to met by 3/30/2024    1. Sit to stand transfer with Stand-by Assistance  2. Bed to chair transfer with Stand-by Assistance using No Assistive Device  3. Gait  x 100 feet with Stand-by Assistance using No Assistive Device   4. Ascend/Descend 6 inch curb step with Contact Guard Assistance using No Assistive Device.  5. Stand for 5 minutes with Supervision using No Assistive Device  6. Lower extremity exercise program x15 reps per Instruction, with assistance as needed in order to facilitate improvement in functional independence                       Subjective     RN notified prior to session. Family present upon PT entrance into room. Patient agreeable to PT treatment session.    Chief Complaint: "My breathing feels okay my legs just feel really weak"  Patient/Family Comments/goals: go home, get better  Pain/Comfort:  Pain Rating 1: 0/10  Pain Rating Post-Intervention 1: 0/10      Objective:     Patient found up in chair with: oxygen, telemetry   Cognition:   Alert and Cooperative  Patient is oriented to Person, Place, Time, Situation  General Precautions: Standard, Cardiac fall, sternal   Orthopedic Precautions:N/A   Braces: N/A   Body mass index is 35.83 kg/m².  Oxygen Device: Nasal Cannula 3L  Vitals: BP (!) 109/52 (Patient Position: Lying)   Pulse 84   Temp 97.8 °F (36.6 °C) (Oral)   Resp 17   Ht 5' 6" (1.676 m)   Wt 100.7 kg (222 lb 0.1 oz)   SpO2 (!) 94%  "  Breastfeeding No   BMI 35.83 kg/m²     Outcome Measures:  AM-PAC 6 CLICK MOBILITY  Turning over in bed (including adjusting bedclothes, sheets and blankets)?: 3  Sitting down on and standing up from a chair with arms (e.g., wheelchair, bedside commode, etc.): 3  Moving to and from a bed to a chair (including a wheelchair)?: 3  Need to walk in hospital room?: 3  Climbing 3-5 steps with a railing?: 2     Functional Mobility:    Bed Mobility:   Pt found/returned to bedside chair    Transfers:   Sit <> Stand Transfer: minimum assistance with no assistive device     Balance:  Standing:  Static: stand by assistance  Dynamic: stand by assistance      Gait:  Patient ambulated: 148' with no standing rest breaks   Patient required: standy by assistance  Patient used:  no assistive device   Gait Deviation(s): steady gait, decreased step length, flexed posture, and decreased laine  all lines remained intact throughout ambulation trial  Comments: Patient with SpO2 87% right after trial but >90% with seated rest break. Pt with minor SOB and reports of BLE weakness after trial.     Education:  Time provided for education, counseling and discussion of health disposition in regards to patient's current status  All questions answered within PT scope of practice and to patient's satisfaction  PT role in POC to address current functional deficits  Pt educated on proper body mechanics, safety techniques, and energy conservation with PT facilitation and cueing throughout session  Ambulate with nsg over weekend  Out of bed to chair in AM over weekend  Continue walking to bathroom over weekend    Patient left up in chair with all lines intact, call button in reach, RN notified, and family present.    Time Tracking:     PT Received On: 03/22/24  PT Start Time: 1325     PT Stop Time: 1334  PT Total Time (min): 9 min     Billable Minutes:   Gait Training 9 minutes    Treatment Type: Treatment  PT/PTA: PT       3/22/2024

## 2024-03-22 NOTE — ASSESSMENT & PLAN NOTE
ASA  BB   Lasix 40 IV BID  Potassium replacements   PT/OT   Bowel regimen   Pain regimen   Cardiac diet with 1500mL fluid restriction. Boost    Tubes and wires out   Wean oxygen as tolerated. Goal >88%. Ok for patient to ambulate pending no dizziness and titrate oxygen as needed.  Can use platform walker.       Breathing treatments ordered.      TTE with no MR

## 2024-03-22 NOTE — SUBJECTIVE & OBJECTIVE
Interval History: NAEON. Encouraged patient to take small walks. Get up to use the bathroom instead of purewick. CXR from today looks good. Labs stable. Continue to wean oxygen as tolerated.     Review of Systems   Constitutional: Positive for malaise/fatigue.   Cardiovascular:  Positive for dyspnea on exertion. Negative for chest pain.   Respiratory:  Positive for shortness of breath.    Neurological:  Positive for weakness.   Medications:  Continuous Infusions:  Scheduled Meds:   acetaminophen  1,000 mg Oral Q6H    aspirin  325 mg Oral Daily    docusate sodium  100 mg Oral BID    ezetimibe  10 mg Oral QHS    furosemide (LASIX) injection  40 mg Intravenous Q12H    levalbuterol  0.63 mg Nebulization Q12H    levothyroxine  150 mcg Oral Before breakfast    magnesium oxide  800 mg Oral Daily    methocarbamoL  500 mg Oral TID    metoprolol tartrate  50 mg Oral BID    pantoprazole  40 mg Oral Daily    polyethylene glycol  17 g Oral Daily    potassium chloride  10 mEq Oral BID    traZODone  100 mg Oral QHS     PRN Meds:bisacodyL, dextrose 10%, dextrose 10%, ondansetron, oxyCODONE, oxyCODONE, prochlorperazine, sodium chloride 0.9%     Objective:     Vital Signs (Most Recent):  Temp: 97.5 °F (36.4 °C) (03/22/24 0833)  Pulse: 82 (03/22/24 0833)  Resp: 19 (03/22/24 0833)  BP: 129/61 (03/22/24 0833)  SpO2: (!) 94 % (03/22/24 0833) Vital Signs (24h Range):  Temp:  [97.5 °F (36.4 °C)-98.5 °F (36.9 °C)] 97.5 °F (36.4 °C)  Pulse:  [74-92] 82  Resp:  [16-19] 19  SpO2:  [88 %-94 %] 94 %  BP: (117-133)/(56-64) 129/61     Weight: 100.7 kg (222 lb 0.1 oz)  Body mass index is 35.83 kg/m².    SpO2: (!) 94 %       Intake/Output - Last 3 Shifts         03/20 0700  03/21 0659 03/21 0700 03/22 0659 03/22 0700 03/23 0659    P.O. 1200 1080     Total Intake(mL/kg) 1200 (11.7) 1080 (10.7)     Urine (mL/kg/hr) 3200 (1.3) 2200 (0.9)     Stool       Total Output 3200 2200     Net -2000 -1120                    Lines/Drains/Airways   "     Drain  Duration             Female External Urinary Catheter w/ Suction 03/17/24 5 days              Peripheral Intravenous Line  Duration                  Peripheral IV - Single Lumen 03/15/24 0805 16 G Right;Posterior Forearm 7 days                     Physical Exam  Vitals reviewed.   Constitutional:       General: She is not in acute distress.     Appearance: She is well-developed. She is not diaphoretic.   HENT:      Head: Normocephalic and atraumatic.   Eyes:      Pupils: Pupils are equal, round, and reactive to light.   Neck:      Vascular: No JVD.   Cardiovascular:      Rate and Rhythm: Normal rate and regular rhythm.      Comments: Midline sternal incision c/d/i  Pulmonary:      Effort: Pulmonary effort is normal. No respiratory distress.      Comments: 3L NC  Abdominal:      General: There is no distension.   Musculoskeletal:         General: Swelling (improving lower extremity) present. Normal range of motion.      Cervical back: Normal range of motion.   Skin:     Coloration: Skin is not pale.   Neurological:      Mental Status: She is alert. Mental status is at baseline.   Psychiatric:         Speech: Speech normal.         Behavior: Behavior normal.         Thought Content: Thought content normal.         Judgment: Judgment normal.        Significant Labs:  BMP:   Recent Labs   Lab 03/22/24  0343   GLU 96      K 3.9   CL 96   CO2 30*   BUN 25*   CREATININE 1.0   CALCIUM 9.3   MG 1.7     Cardiac markers: No results for input(s): "CKMB", "CPKMB", "TROPONINT", "TROPONINI", "MYOGLOBIN" in the last 48 hours.  CBC:   Recent Labs   Lab 03/22/24  0343   WBC 5.56   RBC 2.91*   HGB 9.5*   HCT 29.6*      *   MCH 32.6*   MCHC 32.1       Significant Diagnostics:  I have reviewed all pertinent imaging results/findings within the past 24 hours.  "

## 2024-03-22 NOTE — CHAPLAIN
03/20/24 1145   Clinical Encounter Type   Visit Type Initial Visit   Visit Category General Rounding   Visited With Patient not available   Continue Visiting Yes   Patient Spiritual Encounters   Comments - Patient pt was busy with treatment procedures.

## 2024-03-22 NOTE — PLAN OF CARE
Problem: Adult Inpatient Plan of Care  Goal: Plan of Care Review  Outcome: Ongoing, Progressing  Goal: Patient-Specific Goal (Individualized)  Outcome: Ongoing, Progressing  Goal: Absence of Hospital-Acquired Illness or Injury  Outcome: Ongoing, Progressing  Goal: Optimal Comfort and Wellbeing  Outcome: Ongoing, Progressing  Goal: Readiness for Transition of Care  Outcome: Ongoing, Progressing     Problem: Activity Intolerance (Cardiovascular Surgery)  Goal: Improved Activity Tolerance  Outcome: Ongoing, Progressing     Problem: Adjustment to Surgery (Cardiovascular Surgery)  Goal: Optimal Coping with Heart Surgery  Outcome: Ongoing, Progressing     Problem: Bleeding (Cardiovascular Surgery)  Goal: Bleeding (Cardiovascular Surgery)  Outcome: Ongoing, Progressing     Problem: Bowel Motility Impaired (Cardiovascular Surgery)  Goal: Effective Bowel Elimination (Cardiovascular Surgery)  Outcome: Ongoing, Progressing     Problem: Cardiac Function Impaired (Cardiovascular Surgery)  Goal: Effective Cardiac Function  Outcome: Ongoing, Progressing     Problem: Cerebral Tissue Perfusion (Cardiovascular Surgery)  Goal: Optimal Cerebral Tissue Perfusion (Cardiovascular Surgery)  Outcome: Ongoing, Progressing     Problem: Fluid and Electrolyte Imbalance (Cardiovascular Surgery)  Goal: Fluid and Electrolyte Balance (Cardiovascular Surgery)  Outcome: Ongoing, Progressing     Problem: Glycemic Control Impaired (Cardiovascular Surgery)  Goal: Blood Glucose Level Within Targeted Range (Cardiovascular Surgery)  Outcome: Ongoing, Progressing     Problem: Infection (Cardiovascular Surgery)  Goal: Absence of Infection Signs and Symptoms  Outcome: Ongoing, Progressing     Problem: Ongoing Anesthesia Effects (Cardiovascular Surgery)  Goal: Anesthesia/Sedation Recovery  Outcome: Ongoing, Progressing     Problem: Pain (Cardiovascular Surgery)  Goal: Acceptable Pain Control  Outcome: Ongoing, Progressing     Problem: Postoperative Nausea  and Vomiting (Cardiovascular Surgery)  Goal: Nausea and Vomiting Relief (Cardiovascular Surgery)  Outcome: Ongoing, Progressing     Problem: Postoperative Urinary Retention (Cardiovascular Surgery)  Goal: Effective Urinary Elimination (Cardiovascular Surgery)  Outcome: Ongoing, Progressing     Problem: Respiratory Compromise (Cardiovascular Surgery)  Goal: Effective Oxygenation and Ventilation (Cardiovascular Surgery)  Outcome: Ongoing, Progressing     Problem: Infection  Goal: Absence of Infection Signs and Symptoms  Outcome: Ongoing, Progressing     Problem: Skin Injury Risk Increased  Goal: Skin Health and Integrity  Outcome: Ongoing, Progressing     Problem: Fall Injury Risk  Goal: Absence of Fall and Fall-Related Injury  Outcome: Ongoing, Progressing

## 2024-03-22 NOTE — PLAN OF CARE
CHW met with patient/family at bedside. Patient experience rounding completed and reviewed the following.     Do you know your discharge plan? Yes or No,    If yes, what is the plan? (Home, Home Health, Rehab, SNF, LTAC, or Other) Yes Home w/ HH    Have you discussed your needs and preferences with your SW/CM? Yes or No  Yes Home w/ HH    If you are discharging home, do you have help at home? Yes or No Yes (family)    Do you think you will need help additional at home at discharge? Yes or No  No    Do you currently have difficulty keeping up with bills, affording medicine or buying food? Yes or No No    Assigned SW/CM notified of any patient/family needs or concerns. Appropriate resources provided to address patient's needs.          Leidy Guardado CHW  Case Management  [phone]

## 2024-03-23 LAB
ANION GAP SERPL CALC-SCNC: 11 MMOL/L (ref 8–16)
BASOPHILS # BLD AUTO: 0.04 K/UL (ref 0–0.2)
BASOPHILS NFR BLD: 0.7 % (ref 0–1.9)
BUN SERPL-MCNC: 25 MG/DL (ref 8–23)
CALCIUM SERPL-MCNC: 9.3 MG/DL (ref 8.7–10.5)
CHLORIDE SERPL-SCNC: 95 MMOL/L (ref 95–110)
CO2 SERPL-SCNC: 29 MMOL/L (ref 23–29)
CREAT SERPL-MCNC: 1 MG/DL (ref 0.5–1.4)
DIFFERENTIAL METHOD BLD: ABNORMAL
EOSINOPHIL # BLD AUTO: 0.2 K/UL (ref 0–0.5)
EOSINOPHIL NFR BLD: 4.2 % (ref 0–8)
ERYTHROCYTE [DISTWIDTH] IN BLOOD BY AUTOMATED COUNT: 13.1 % (ref 11.5–14.5)
EST. GFR  (NO RACE VARIABLE): >60 ML/MIN/1.73 M^2
GLUCOSE SERPL-MCNC: 89 MG/DL (ref 70–110)
HCT VFR BLD AUTO: 29.7 % (ref 37–48.5)
HGB BLD-MCNC: 9.4 G/DL (ref 12–16)
IMM GRANULOCYTES # BLD AUTO: 0.05 K/UL (ref 0–0.04)
IMM GRANULOCYTES NFR BLD AUTO: 0.9 % (ref 0–0.5)
LYMPHOCYTES # BLD AUTO: 1.8 K/UL (ref 1–4.8)
LYMPHOCYTES NFR BLD: 31.7 % (ref 18–48)
MAGNESIUM SERPL-MCNC: 1.8 MG/DL (ref 1.6–2.6)
MCH RBC QN AUTO: 32 PG (ref 27–31)
MCHC RBC AUTO-ENTMCNC: 31.6 G/DL (ref 32–36)
MCV RBC AUTO: 101 FL (ref 82–98)
MONOCYTES # BLD AUTO: 0.7 K/UL (ref 0.3–1)
MONOCYTES NFR BLD: 12.9 % (ref 4–15)
NEUTROPHILS # BLD AUTO: 2.7 K/UL (ref 1.8–7.7)
NEUTROPHILS NFR BLD: 49.6 % (ref 38–73)
NRBC BLD-RTO: 0 /100 WBC
PHOSPHATE SERPL-MCNC: 3.8 MG/DL (ref 2.7–4.5)
PLATELET # BLD AUTO: 164 K/UL (ref 150–450)
PMV BLD AUTO: 10.2 FL (ref 9.2–12.9)
POTASSIUM SERPL-SCNC: 3.6 MMOL/L (ref 3.5–5.1)
RBC # BLD AUTO: 2.94 M/UL (ref 4–5.4)
SODIUM SERPL-SCNC: 135 MMOL/L (ref 136–145)
WBC # BLD AUTO: 5.52 K/UL (ref 3.9–12.7)

## 2024-03-23 PROCEDURE — 63600175 PHARM REV CODE 636 W HCPCS: Performed by: STUDENT IN AN ORGANIZED HEALTH CARE EDUCATION/TRAINING PROGRAM

## 2024-03-23 PROCEDURE — 84100 ASSAY OF PHOSPHORUS: CPT | Performed by: STUDENT IN AN ORGANIZED HEALTH CARE EDUCATION/TRAINING PROGRAM

## 2024-03-23 PROCEDURE — 85025 COMPLETE CBC W/AUTO DIFF WBC: CPT | Performed by: STUDENT IN AN ORGANIZED HEALTH CARE EDUCATION/TRAINING PROGRAM

## 2024-03-23 PROCEDURE — 27000221 HC OXYGEN, UP TO 24 HOURS

## 2024-03-23 PROCEDURE — 94640 AIRWAY INHALATION TREATMENT: CPT

## 2024-03-23 PROCEDURE — 25000003 PHARM REV CODE 250: Performed by: STUDENT IN AN ORGANIZED HEALTH CARE EDUCATION/TRAINING PROGRAM

## 2024-03-23 PROCEDURE — 94761 N-INVAS EAR/PLS OXIMETRY MLT: CPT

## 2024-03-23 PROCEDURE — 20600001 HC STEP DOWN PRIVATE ROOM

## 2024-03-23 PROCEDURE — 25000003 PHARM REV CODE 250: Performed by: PHYSICIAN ASSISTANT

## 2024-03-23 PROCEDURE — 97535 SELF CARE MNGMENT TRAINING: CPT

## 2024-03-23 PROCEDURE — 99900035 HC TECH TIME PER 15 MIN (STAT)

## 2024-03-23 PROCEDURE — 80048 BASIC METABOLIC PNL TOTAL CA: CPT | Performed by: PHYSICIAN ASSISTANT

## 2024-03-23 PROCEDURE — 25000242 PHARM REV CODE 250 ALT 637 W/ HCPCS: Performed by: PHYSICIAN ASSISTANT

## 2024-03-23 PROCEDURE — 63600175 PHARM REV CODE 636 W HCPCS: Performed by: PHYSICIAN ASSISTANT

## 2024-03-23 PROCEDURE — 97530 THERAPEUTIC ACTIVITIES: CPT

## 2024-03-23 PROCEDURE — 36415 COLL VENOUS BLD VENIPUNCTURE: CPT | Performed by: PHYSICIAN ASSISTANT

## 2024-03-23 PROCEDURE — 25000003 PHARM REV CODE 250: Performed by: ANESTHESIOLOGY

## 2024-03-23 PROCEDURE — 25000003 PHARM REV CODE 250

## 2024-03-23 PROCEDURE — 97116 GAIT TRAINING THERAPY: CPT | Mod: CQ

## 2024-03-23 PROCEDURE — 83735 ASSAY OF MAGNESIUM: CPT | Performed by: STUDENT IN AN ORGANIZED HEALTH CARE EDUCATION/TRAINING PROGRAM

## 2024-03-23 RX ORDER — FUROSEMIDE 10 MG/ML
40 INJECTION INTRAMUSCULAR; INTRAVENOUS 2 TIMES DAILY
Status: DISCONTINUED | OUTPATIENT
Start: 2024-03-23 | End: 2024-03-25 | Stop reason: HOSPADM

## 2024-03-23 RX ORDER — POTASSIUM CHLORIDE 20 MEQ/1
20 TABLET, EXTENDED RELEASE ORAL 2 TIMES DAILY
Status: DISCONTINUED | OUTPATIENT
Start: 2024-03-23 | End: 2024-03-25 | Stop reason: HOSPADM

## 2024-03-23 RX ADMIN — PANTOPRAZOLE SODIUM 40 MG: 40 TABLET, DELAYED RELEASE ORAL at 09:03

## 2024-03-23 RX ADMIN — METOPROLOL TARTRATE 50 MG: 50 TABLET, FILM COATED ORAL at 09:03

## 2024-03-23 RX ADMIN — ACETAMINOPHEN 1000 MG: 500 TABLET ORAL at 11:03

## 2024-03-23 RX ADMIN — ACETAMINOPHEN 1000 MG: 500 TABLET ORAL at 07:03

## 2024-03-23 RX ADMIN — POTASSIUM CHLORIDE 20 MEQ: 1500 TABLET, EXTENDED RELEASE ORAL at 08:03

## 2024-03-23 RX ADMIN — METHOCARBAMOL 500 MG: 500 TABLET ORAL at 08:03

## 2024-03-23 RX ADMIN — POLYETHYLENE GLYCOL 3350 17 G: 17 POWDER, FOR SOLUTION ORAL at 09:03

## 2024-03-23 RX ADMIN — OXYCODONE 5 MG: 5 TABLET ORAL at 06:03

## 2024-03-23 RX ADMIN — ASPIRIN 325 MG: 325 TABLET, COATED ORAL at 09:03

## 2024-03-23 RX ADMIN — DOCUSATE SODIUM 100 MG: 100 CAPSULE, LIQUID FILLED ORAL at 08:03

## 2024-03-23 RX ADMIN — LEVALBUTEROL HYDROCHLORIDE 0.63 MG: 0.63 SOLUTION RESPIRATORY (INHALATION) at 09:03

## 2024-03-23 RX ADMIN — METHOCARBAMOL 500 MG: 500 TABLET ORAL at 09:03

## 2024-03-23 RX ADMIN — EZETIMIBE 10 MG: 10 TABLET ORAL at 08:03

## 2024-03-23 RX ADMIN — METOPROLOL TARTRATE 50 MG: 50 TABLET, FILM COATED ORAL at 08:03

## 2024-03-23 RX ADMIN — ACETAMINOPHEN 1000 MG: 500 TABLET ORAL at 12:03

## 2024-03-23 RX ADMIN — METHOCARBAMOL 500 MG: 500 TABLET ORAL at 03:03

## 2024-03-23 RX ADMIN — POTASSIUM CHLORIDE 20 MEQ: 1500 TABLET, EXTENDED RELEASE ORAL at 09:03

## 2024-03-23 RX ADMIN — Medication 800 MG: at 09:03

## 2024-03-23 RX ADMIN — LEVALBUTEROL HYDROCHLORIDE 0.63 MG: 0.63 SOLUTION RESPIRATORY (INHALATION) at 08:03

## 2024-03-23 RX ADMIN — FUROSEMIDE 40 MG: 10 INJECTION, SOLUTION INTRAVENOUS at 07:03

## 2024-03-23 RX ADMIN — TRAZODONE HYDROCHLORIDE 100 MG: 100 TABLET ORAL at 08:03

## 2024-03-23 RX ADMIN — DOCUSATE SODIUM 100 MG: 100 CAPSULE, LIQUID FILLED ORAL at 09:03

## 2024-03-23 RX ADMIN — FUROSEMIDE 40 MG: 10 INJECTION, SOLUTION INTRAVENOUS at 09:03

## 2024-03-23 RX ADMIN — LEVOTHYROXINE SODIUM 150 MCG: 150 TABLET ORAL at 06:03

## 2024-03-23 NOTE — PLAN OF CARE
Problem: Adult Inpatient Plan of Care  Goal: Plan of Care Review  Outcome: Ongoing, Progressing  Goal: Patient-Specific Goal (Individualized)  Outcome: Ongoing, Progressing  Goal: Absence of Hospital-Acquired Illness or Injury  Outcome: Ongoing, Progressing  Goal: Optimal Comfort and Wellbeing  Outcome: Ongoing, Progressing  Goal: Readiness for Transition of Care  Outcome: Ongoing, Progressing     Problem: Activity Intolerance (Cardiovascular Surgery)  Goal: Improved Activity Tolerance  Outcome: Ongoing, Progressing     Problem: Adjustment to Surgery (Cardiovascular Surgery)  Goal: Optimal Coping with Heart Surgery  Outcome: Ongoing, Progressing     Problem: Bleeding (Cardiovascular Surgery)  Goal: Bleeding (Cardiovascular Surgery)  Outcome: Ongoing, Progressing     Problem: Bowel Motility Impaired (Cardiovascular Surgery)  Goal: Effective Bowel Elimination (Cardiovascular Surgery)  Outcome: Ongoing, Progressing     Problem: Cardiac Function Impaired (Cardiovascular Surgery)  Goal: Effective Cardiac Function  Outcome: Ongoing, Progressing     Problem: Cerebral Tissue Perfusion (Cardiovascular Surgery)  Goal: Optimal Cerebral Tissue Perfusion (Cardiovascular Surgery)  Outcome: Ongoing, Progressing     Problem: Infection (Cardiovascular Surgery)  Goal: Absence of Infection Signs and Symptoms  Outcome: Ongoing, Progressing     Problem: Pain (Cardiovascular Surgery)  Goal: Acceptable Pain Control  Outcome: Ongoing, Progressing

## 2024-03-23 NOTE — SUBJECTIVE & OBJECTIVE
Interval History: No acute events  Feels better today  On 1-2L NC    Review of Systems   Constitutional: Positive for malaise/fatigue.   Cardiovascular:  Positive for dyspnea on exertion. Negative for chest pain.   Respiratory:  Positive for shortness of breath.    Neurological:  Positive for weakness.     Medications:  Continuous Infusions:  Scheduled Meds:   acetaminophen  1,000 mg Oral Q6H    aspirin  325 mg Oral Daily    docusate sodium  100 mg Oral BID    estradiol 0.05 mg/24 hr td ptsw  1 patch Transdermal Twice Weekly    ezetimibe  10 mg Oral QHS    furosemide (LASIX) injection  40 mg Intravenous Q12H    levalbuterol  0.63 mg Nebulization Q12H    levothyroxine  150 mcg Oral Before breakfast    magnesium oxide  800 mg Oral Daily    methocarbamoL  500 mg Oral TID    metoprolol tartrate  50 mg Oral BID    pantoprazole  40 mg Oral Daily    polyethylene glycol  17 g Oral Daily    potassium chloride  20 mEq Oral BID    traZODone  100 mg Oral QHS     PRN Meds:bisacodyL, dextrose 10%, dextrose 10%, ondansetron, oxyCODONE, oxyCODONE, prochlorperazine, sodium chloride 0.9%     Objective:     Vital Signs (Most Recent):  Temp: 97.4 °F (36.3 °C) (03/23/24 0428)  Pulse: 84 (03/23/24 0815)  Resp: 17 (03/23/24 0815)  BP: 137/78 (03/23/24 0721)  SpO2: 95 % (03/23/24 0815) Vital Signs (24h Range):  Temp:  [97.4 °F (36.3 °C)-98.4 °F (36.9 °C)] 97.4 °F (36.3 °C)  Pulse:  [72-94] 84  Resp:  [16-19] 17  SpO2:  [93 %-98 %] 95 %  BP: (109-137)/(52-78) 137/78     Weight: 97.6 kg (215 lb 2.7 oz)  Body mass index is 34.73 kg/m².    SpO2: 95 %       Intake/Output - Last 3 Shifts         03/21 0700  03/22 0659 03/22 0700  03/23 0659 03/23 0700  03/24 0659    P.O. 1080 720 480    Total Intake(mL/kg) 1080 (10.7) 720 (7.3) 480 (4.9)    Urine (mL/kg/hr) 2200 (0.9) 2550 (1.1) 300 (0.8)    Emesis/NG output  0     Stool  0     Total Output 2200 2550 300    Net -1120 -1830 +180           Urine Occurrence  2 x     Stool Occurrence  1 x     Emesis  Occurrence  0 x             Lines/Drains/Airways       Drain  Duration             Female External Urinary Catheter w/ Suction 03/17/24 6 days              Peripheral Intravenous Line  Duration                  Peripheral IV - Single Lumen 03/15/24 0805 16 G Right;Posterior Forearm 8 days                     Physical Exam  Vitals reviewed.   Constitutional:       General: She is not in acute distress.     Appearance: She is well-developed. She is not diaphoretic.   HENT:      Head: Normocephalic and atraumatic.   Eyes:      Pupils: Pupils are equal, round, and reactive to light.   Neck:      Vascular: No JVD.   Cardiovascular:      Rate and Rhythm: Normal rate and regular rhythm.      Comments: Midline sternal incision c/d/i  Pulmonary:      Effort: Pulmonary effort is normal. No respiratory distress.      Comments: 2L NC  Abdominal:      General: There is no distension.   Musculoskeletal:         General: Swelling (improving lower extremity) present. Normal range of motion.      Cervical back: Normal range of motion.   Skin:     Coloration: Skin is not pale.   Neurological:      Mental Status: She is alert. Mental status is at baseline.   Psychiatric:         Speech: Speech normal.         Behavior: Behavior normal.         Thought Content: Thought content normal.         Judgment: Judgment normal.            Significant Labs:  All pertinent labs from the last 24 hours have been reviewed.    Significant Diagnostics:  I have reviewed all pertinent imaging results/findings within the past 24 hours.

## 2024-03-23 NOTE — PT/OT/SLP PROGRESS
Physical Therapy Treatment    Patient Name:  Mora Adrian   MRN:  [id number]    Recommendations:     Discharge Recommendations: Low Intensity Therapy  Discharge Equipment Recommendations: shower chair  Barriers to discharge: None    Assessment:     Mora Adrian is a 68 y.o. female admitted with a medical diagnosis of Nonrheumatic mitral valve regurgitation.  She presents with the following impairments/functional limitations: weakness, impaired endurance, impaired functional mobility, gait instability, decreased lower extremity function, impaired skin, edema, impaired cardiopulmonary response to activity requiring min assistance and verbal cues for sit < > stand transitions; SBA for gait due to fatigue.   Pt remains motivated to participate in PT session and will cont to benefit from skilled PT intervention..  .    Rehab Prognosis: Good; patient would benefit from acute skilled PT services to address these deficits and reach maximum level of function.    Recent Surgery: Procedure(s) (LRB):  REPAIR, MITRAL VALVE, OPEN (N/A)  EXCLUSION, LEFT ATRIAL APPENDAGE, OPEN, AS PART OF OPEN CHEST SURGERY (N/A) 8 Days Post-Op    Plan:     During this hospitalization, patient to be seen 5 x/week to address the identified rehab impairments via gait training, therapeutic activities, therapeutic exercises, neuromuscular re-education and progress toward the following goals:    Plan of Care Expires:  04/15/24    Subjective     Chief Complaint: fatigue  Pain/Comfort:  Pain Rating 1: 0/10  Pain Rating Post-Intervention 1: 0/10      Objective:     Communicated with nurse (Stacey) prior to session.  Patient found up in chair with oxygen, telemetry (no family present) upon PT entry to room.     General Precautions: Standard, fall, sternal  Orthopedic Precautions: N/A  Braces: N/A  Respiratory Status: Nasal cannula, flow 1 L/min     Functional Mobility:  Transfers:     Sit to Stand:  from BS chair with min A; from EOB with SBA with no  AD  Gait: no AD SBA 200ft ft in hallway with no LOB.   Pt's SpO2 monitored during tx session and in remains between 94% and 97% while on 2LPM via NC        AM-PAC 6 CLICK MOBILITY  Turning over in bed (including adjusting bedclothes, sheets and blankets)?: 3  Sitting down on and standing up from a chair with arms (e.g., wheelchair, bedside commode, etc.): 3  Moving from lying on back to sitting on the side of the bed?: 3  Moving to and from a bed to a chair (including a wheelchair)?: 3  Need to walk in hospital room?: 3  Climbing 3-5 steps with a railing?: 2  Basic Mobility Total Score: 17       Treatment & Education:  Patient provided with daily orientation and goals of this PT session. They were educated to call for assistance and to transfer with hospital staff only.  Also, pt was educated on the effects of prolonged immobility and the importance of performing OOB activity and exercises to promote healing and reduce recovery time    Patient left up in chair with all lines intact and call button in reach..    GOALS:   Multidisciplinary Problems       Physical Therapy Goals          Problem: Physical Therapy    Goal Priority Disciplines Outcome Goal Variances Interventions   Physical Therapy Goal     PT, PT/OT Ongoing, Progressing     Description: Goals to met by 3/30/2024    1. Sit to stand transfer with Stand-by Assistance  2. Bed to chair transfer with Stand-by Assistance using No Assistive Device  3. Gait  x 100 feet with Stand-by Assistance using No Assistive Device   4. Ascend/Descend 6 inch curb step with Contact Guard Assistance using No Assistive Device.  5. Stand for 5 minutes with Supervision using No Assistive Device  6. Lower extremity exercise program x15 reps per Instruction, with assistance as needed in order to facilitate improvement in functional independence                       Time Tracking:     PT Received On: 03/23/24  PT Start Time: 1336     PT Stop Time: 1353  PT Total Time (min): 17 min      Billable Minutes: Gait Training 17    Treatment Type: Treatment  PT/PTA: PTA     Number of PTA visits since last PT visit: 1 03/23/2024

## 2024-03-23 NOTE — PROGRESS NOTES
[facility] - Cardiology Stepdown  Cardiothoracic Surgery  Progress Note    Patient Name: Mora Adrian  MRN: [id number]  Admission Date: 3/15/2024  Hospital Length of Stay: 8 days  Code Status: Full Code   Attending Physician: Pedrito Bernal MD   Referring Provider: Pedrito Bernal MD  Principal Problem:Nonrheumatic mitral valve regurgitation        Subjective:     Post-Op Info:  Procedure(s) (LRB):  REPAIR, MITRAL VALVE, OPEN (N/A)  EXCLUSION, LEFT ATRIAL APPENDAGE, OPEN, AS PART OF OPEN CHEST SURGERY (N/A)   8 Days Post-Op     Interval History: No acute events  Feels better today  On 1-2L NC    Review of Systems   Constitutional: Positive for malaise/fatigue.   Cardiovascular:  Positive for dyspnea on exertion. Negative for chest pain.   Respiratory:  Positive for shortness of breath.    Neurological:  Positive for weakness.     Medications:  Continuous Infusions:  Scheduled Meds:   acetaminophen  1,000 mg Oral Q6H    aspirin  325 mg Oral Daily    docusate sodium  100 mg Oral BID    estradiol 0.05 mg/24 hr td ptsw  1 patch Transdermal Twice Weekly    ezetimibe  10 mg Oral QHS    furosemide (LASIX) injection  40 mg Intravenous Q12H    levalbuterol  0.63 mg Nebulization Q12H    levothyroxine  150 mcg Oral Before breakfast    magnesium oxide  800 mg Oral Daily    methocarbamoL  500 mg Oral TID    metoprolol tartrate  50 mg Oral BID    pantoprazole  40 mg Oral Daily    polyethylene glycol  17 g Oral Daily    potassium chloride  20 mEq Oral BID    traZODone  100 mg Oral QHS     PRN Meds:bisacodyL, dextrose 10%, dextrose 10%, ondansetron, oxyCODONE, oxyCODONE, prochlorperazine, sodium chloride 0.9%     Objective:     Vital Signs (Most Recent):  Temp: 97.4 °F (36.3 °C) (03/23/24 0428)  Pulse: 84 (03/23/24 0815)  Resp: 17 (03/23/24 0815)  BP: 137/78 (03/23/24 0721)  SpO2: 95 % (03/23/24 0815) Vital Signs (24h Range):  Temp:  [97.4 °F (36.3 °C)-98.4 °F (36.9 °C)] 97.4 °F (36.3 °C)  Pulse:  [72-94] 84  Resp:   [16-19] 17  SpO2:  [93 %-98 %] 95 %  BP: (109-137)/(52-78) 137/78     Weight: 97.6 kg (215 lb 2.7 oz)  Body mass index is 34.73 kg/m².    SpO2: 95 %       Intake/Output - Last 3 Shifts         03/21 0700  03/22 0659 03/22 0700  03/23 0659 03/23 0700  03/24 0659    P.O. 1080 720 480    Total Intake(mL/kg) 1080 (10.7) 720 (7.3) 480 (4.9)    Urine (mL/kg/hr) 2200 (0.9) 2550 (1.1) 300 (0.8)    Emesis/NG output  0     Stool  0     Total Output 2200 2550 300    Net -1120 -1830 +180           Urine Occurrence  2 x     Stool Occurrence  1 x     Emesis Occurrence  0 x             Lines/Drains/Airways       Drain  Duration             Female External Urinary Catheter w/ Suction 03/17/24 6 days              Peripheral Intravenous Line  Duration                  Peripheral IV - Single Lumen 03/15/24 0805 16 G Right;Posterior Forearm 8 days                     Physical Exam  Vitals reviewed.   Constitutional:       General: She is not in acute distress.     Appearance: She is well-developed. She is not diaphoretic.   HENT:      Head: Normocephalic and atraumatic.   Eyes:      Pupils: Pupils are equal, round, and reactive to light.   Neck:      Vascular: No JVD.   Cardiovascular:      Rate and Rhythm: Normal rate and regular rhythm.      Comments: Midline sternal incision c/d/i  Pulmonary:      Effort: Pulmonary effort is normal. No respiratory distress.      Comments: 2L NC  Abdominal:      General: There is no distension.   Musculoskeletal:         General: Swelling (improving lower extremity) present. Normal range of motion.      Cervical back: Normal range of motion.   Skin:     Coloration: Skin is not pale.   Neurological:      Mental Status: She is alert. Mental status is at baseline.   Psychiatric:         Speech: Speech normal.         Behavior: Behavior normal.         Thought Content: Thought content normal.         Judgment: Judgment normal.            Significant Labs:  All pertinent labs from the last 24 hours have  been reviewed.    Significant Diagnostics:  I have reviewed all pertinent imaging results/findings within the past 24 hours.  Assessment/Plan:     Hyponatremia  BMP daily     Obesity (BMI 30-39.9)  PT OT     Hypophosphatemia  Replaced PO   Daily labs   Resolved     Acute blood loss anemia  Patient with baseline anemia compounded with expected post operative blood loss   CBC daily   (H/H 11.7/36 at pre-op visit)     Status post ligation of left atrial appendage  NSR with some PVCs on monitor   BB    S/P mitral valve repair  ASA  BB   Lasix 40 IV BID  Potassium replacements   PT/OT   Bowel regimen   Pain regimen   Cardiac diet with 1500mL fluid restriction. Boost    Tubes and wires out   Wean oxygen as tolerated. Goal >88%. Ok for patient to ambulate pending no dizziness and titrate oxygen as needed.  Can use platform walker.       Breathing treatments ordered.      TTE with no MR     Transient hyperglycemia post procedure  Endocrine following     Hypothyroidism  Home synthroid     HLD (hyperlipidemia)  Home Zetia in place of statin       GERD (gastroesophageal reflux disease)  Protonix     Heart failure with reduced ejection fraction  Wean oxygen as tolerated. Goal O2 saturation 88 and above   Continue Diuresis         Carlito Mosley MD  Cardiothoracic Surgery  [facility] - Cardiology Stepdown

## 2024-03-23 NOTE — PT/OT/SLP PROGRESS
Occupational Therapy   Treatment    Name: Mora Adrian  MRN: [id number]  Admitting Diagnosis:  Nonrheumatic mitral valve regurgitation  8 Days Post-Op    Recommendations:     Discharge Recommendations: Low Intensity Therapy  Discharge Equipment Recommendations:  shower chair  Barriers to discharge:  None    Assessment:     Mora Adrian is a 68 y.o. female with a medical diagnosis of Nonrheumatic mitral valve regurgitation. Pt presents with decreased endurance and impaired mobility performance as limited by cardiovascular status and generalized weakness. Pt found upright in bed and agreeable for therapy. Pt session focused on functional mobility in room and into restroom for grooming tasks at sink. Pt began session on 2L 02 @ 88% Sp02. During mobility pt decreased to ~86-86% and recovered well with seated break after ~10 minutes in restroom remaining on 2L 02.  Patient continues to demonstrate the need for low intensity therapy on a scheduled basis exhibited by decreased independence with self-care and functional mobility   Performance deficits affecting function are weakness, gait instability, impaired endurance, impaired balance, impaired self care skills, impaired functional mobility, decreased upper extremity function, decreased lower extremity function, impaired cardiopulmonary response to activity.     Rehab Prognosis:  Good; patient would benefit from acute skilled OT services to address these deficits and reach maximum level of function.       Plan:     Patient to be seen 5 x/week to address the above listed problems via self-care/home management, therapeutic activities, therapeutic exercises, neuromuscular re-education  Plan of Care Expires: 04/15/24  Plan of Care Reviewed with: patient    Subjective     Chief Complaint: tearful due to 's passing ~8 months ago  Patient/Family Comments/goals: return home with family  Pain/Comfort:  Pain Rating 1: 0/10    Objective:     Communicated with: RN  prior to session.  Patient found up in chair with oxygen, PureWick, telemetry upon OT entry to room.    General Precautions: Standard, fall, sternal    Orthopedic Precautions:N/A  Braces: N/A  Respiratory Status: Nasal cannula, flow 2 L/min     Occupational Performance:     Bed Mobility:    NT    Functional Mobility/Transfers:  Patient completed Sit <> Stand Transfer with contact guard assistance  with  no assistive device   Functional Mobility: Pt stood and mobilized in room and into restroom today standing ~10 minutes at sink for grooming tasks.    Activities of Daily Living:  Grooming: stand by assistance washing face, brushing teeth, and combing hair standing at sink  Upper Body Dressing: minimum assistance donning gown  Lower Body Dressing: total assistance donning underwear standing in restroom  Toileting: total assistance removal of purewick      [facility] 6 Click ADL: 20    Treatment & Education:  Pt educated on role of occupational therapy, POC, and safety during ADLs and functional mobility. Pt and OT discussed importance of safe, continued mobility to optimize daily living skills. Pt verbalized understanding.     White board updated during session. Pt given instruction to call for medical staff/nurse for assistance.       Patient left up in chair with all lines intact, call button in reach, RN notified, and PCT present    GOALS:   Multidisciplinary Problems       Occupational Therapy Goals          Problem: Occupational Therapy    Goal Priority Disciplines Outcome Interventions   Occupational Therapy Goal     OT, PT/OT Ongoing, Progressing    Description: Goals to be met by: 03-30-24     Patient will increase functional independence with ADLs by performing:    UE Dressing with [place].  LE Dressing with Supervision.  Grooming while standing at sink with Supervision.  Toileting from toilet with Supervision for hygiene and clothing management.   Supine to sit with Supervision.  Stand pivot transfers with  Supervision.  Toilet transfer to toilet with Supervision.  Pt. To recall sternal precautions with 100% accuracy                         Time Tracking:     OT Date of Treatment: 03/23/24  OT Start Time: 0724  OT Stop Time: 0750  OT Total Time (min): 26 min    Billable Minutes:Self Care/Home Management 14 min  Therapeutic Activity 12 min    OT/HERMAN: OT          3/23/2024

## 2024-03-24 LAB
ANION GAP SERPL CALC-SCNC: 13 MMOL/L (ref 8–16)
BASOPHILS # BLD AUTO: 0.06 K/UL (ref 0–0.2)
BASOPHILS NFR BLD: 1.1 % (ref 0–1.9)
BUN SERPL-MCNC: 27 MG/DL (ref 8–23)
CALCIUM SERPL-MCNC: 9.1 MG/DL (ref 8.7–10.5)
CHLORIDE SERPL-SCNC: 95 MMOL/L (ref 95–110)
CO2 SERPL-SCNC: 27 MMOL/L (ref 23–29)
CREAT SERPL-MCNC: 1.1 MG/DL (ref 0.5–1.4)
DIFFERENTIAL METHOD BLD: ABNORMAL
EOSINOPHIL # BLD AUTO: 0.2 K/UL (ref 0–0.5)
EOSINOPHIL NFR BLD: 3.4 % (ref 0–8)
ERYTHROCYTE [DISTWIDTH] IN BLOOD BY AUTOMATED COUNT: 13.2 % (ref 11.5–14.5)
EST. GFR  (NO RACE VARIABLE): 54.7 ML/MIN/1.73 M^2
GLUCOSE SERPL-MCNC: 87 MG/DL (ref 70–110)
HCT VFR BLD AUTO: 28.7 % (ref 37–48.5)
HGB BLD-MCNC: 8.8 G/DL (ref 12–16)
IMM GRANULOCYTES # BLD AUTO: 0.05 K/UL (ref 0–0.04)
IMM GRANULOCYTES NFR BLD AUTO: 1 % (ref 0–0.5)
LYMPHOCYTES # BLD AUTO: 1.5 K/UL (ref 1–4.8)
LYMPHOCYTES NFR BLD: 29.2 % (ref 18–48)
MAGNESIUM SERPL-MCNC: 1.9 MG/DL (ref 1.6–2.6)
MCH RBC QN AUTO: 32.5 PG (ref 27–31)
MCHC RBC AUTO-ENTMCNC: 30.7 G/DL (ref 32–36)
MCV RBC AUTO: 106 FL (ref 82–98)
MONOCYTES # BLD AUTO: 0.7 K/UL (ref 0.3–1)
MONOCYTES NFR BLD: 14.1 % (ref 4–15)
NEUTROPHILS # BLD AUTO: 2.7 K/UL (ref 1.8–7.7)
NEUTROPHILS NFR BLD: 51.2 % (ref 38–73)
NRBC BLD-RTO: 0 /100 WBC
PHOSPHATE SERPL-MCNC: 4 MG/DL (ref 2.7–4.5)
PLATELET # BLD AUTO: 180 K/UL (ref 150–450)
PMV BLD AUTO: 10.2 FL (ref 9.2–12.9)
POTASSIUM SERPL-SCNC: 3.8 MMOL/L (ref 3.5–5.1)
RBC # BLD AUTO: 2.71 M/UL (ref 4–5.4)
SODIUM SERPL-SCNC: 135 MMOL/L (ref 136–145)
WBC # BLD AUTO: 5.24 K/UL (ref 3.9–12.7)

## 2024-03-24 PROCEDURE — 85025 COMPLETE CBC W/AUTO DIFF WBC: CPT | Performed by: STUDENT IN AN ORGANIZED HEALTH CARE EDUCATION/TRAINING PROGRAM

## 2024-03-24 PROCEDURE — 25000003 PHARM REV CODE 250: Performed by: ANESTHESIOLOGY

## 2024-03-24 PROCEDURE — 25000242 PHARM REV CODE 250 ALT 637 W/ HCPCS: Performed by: PHYSICIAN ASSISTANT

## 2024-03-24 PROCEDURE — 94640 AIRWAY INHALATION TREATMENT: CPT

## 2024-03-24 PROCEDURE — 63600175 PHARM REV CODE 636 W HCPCS: Performed by: STUDENT IN AN ORGANIZED HEALTH CARE EDUCATION/TRAINING PROGRAM

## 2024-03-24 PROCEDURE — 80048 BASIC METABOLIC PNL TOTAL CA: CPT | Performed by: STUDENT IN AN ORGANIZED HEALTH CARE EDUCATION/TRAINING PROGRAM

## 2024-03-24 PROCEDURE — 25000003 PHARM REV CODE 250: Performed by: STUDENT IN AN ORGANIZED HEALTH CARE EDUCATION/TRAINING PROGRAM

## 2024-03-24 PROCEDURE — 36415 COLL VENOUS BLD VENIPUNCTURE: CPT | Performed by: STUDENT IN AN ORGANIZED HEALTH CARE EDUCATION/TRAINING PROGRAM

## 2024-03-24 PROCEDURE — 25000003 PHARM REV CODE 250

## 2024-03-24 PROCEDURE — 27000221 HC OXYGEN, UP TO 24 HOURS

## 2024-03-24 PROCEDURE — 99900035 HC TECH TIME PER 15 MIN (STAT)

## 2024-03-24 PROCEDURE — 20600001 HC STEP DOWN PRIVATE ROOM

## 2024-03-24 PROCEDURE — 84100 ASSAY OF PHOSPHORUS: CPT | Performed by: STUDENT IN AN ORGANIZED HEALTH CARE EDUCATION/TRAINING PROGRAM

## 2024-03-24 PROCEDURE — 94761 N-INVAS EAR/PLS OXIMETRY MLT: CPT

## 2024-03-24 PROCEDURE — 25000003 PHARM REV CODE 250: Performed by: PHYSICIAN ASSISTANT

## 2024-03-24 PROCEDURE — 83735 ASSAY OF MAGNESIUM: CPT | Performed by: STUDENT IN AN ORGANIZED HEALTH CARE EDUCATION/TRAINING PROGRAM

## 2024-03-24 RX ORDER — LEVALBUTEROL INHALATION SOLUTION 0.63 MG/3ML
0.63 SOLUTION RESPIRATORY (INHALATION)
Status: DISCONTINUED | OUTPATIENT
Start: 2024-03-24 | End: 2024-03-25 | Stop reason: HOSPADM

## 2024-03-24 RX ADMIN — METHOCARBAMOL 500 MG: 500 TABLET ORAL at 04:03

## 2024-03-24 RX ADMIN — DOCUSATE SODIUM 100 MG: 100 CAPSULE, LIQUID FILLED ORAL at 09:03

## 2024-03-24 RX ADMIN — EZETIMIBE 10 MG: 10 TABLET ORAL at 08:03

## 2024-03-24 RX ADMIN — POTASSIUM CHLORIDE 20 MEQ: 1500 TABLET, EXTENDED RELEASE ORAL at 08:03

## 2024-03-24 RX ADMIN — FUROSEMIDE 40 MG: 10 INJECTION, SOLUTION INTRAVENOUS at 09:03

## 2024-03-24 RX ADMIN — ACETAMINOPHEN 1000 MG: 500 TABLET ORAL at 06:03

## 2024-03-24 RX ADMIN — LEVALBUTEROL HYDROCHLORIDE 0.63 MG: 0.63 SOLUTION RESPIRATORY (INHALATION) at 08:03

## 2024-03-24 RX ADMIN — ACETAMINOPHEN 1000 MG: 500 TABLET ORAL at 01:03

## 2024-03-24 RX ADMIN — FUROSEMIDE 40 MG: 10 INJECTION, SOLUTION INTRAVENOUS at 06:03

## 2024-03-24 RX ADMIN — METOPROLOL TARTRATE 50 MG: 50 TABLET, FILM COATED ORAL at 09:03

## 2024-03-24 RX ADMIN — PANTOPRAZOLE SODIUM 40 MG: 40 TABLET, DELAYED RELEASE ORAL at 09:03

## 2024-03-24 RX ADMIN — DOCUSATE SODIUM 100 MG: 100 CAPSULE, LIQUID FILLED ORAL at 08:03

## 2024-03-24 RX ADMIN — POTASSIUM CHLORIDE 20 MEQ: 1500 TABLET, EXTENDED RELEASE ORAL at 09:03

## 2024-03-24 RX ADMIN — METHOCARBAMOL 500 MG: 500 TABLET ORAL at 08:03

## 2024-03-24 RX ADMIN — METOPROLOL TARTRATE 50 MG: 50 TABLET, FILM COATED ORAL at 08:03

## 2024-03-24 RX ADMIN — ASPIRIN 325 MG: 325 TABLET, COATED ORAL at 09:03

## 2024-03-24 RX ADMIN — Medication 800 MG: at 09:03

## 2024-03-24 RX ADMIN — METHOCARBAMOL 500 MG: 500 TABLET ORAL at 09:03

## 2024-03-24 RX ADMIN — LEVOTHYROXINE SODIUM 150 MCG: 150 TABLET ORAL at 06:03

## 2024-03-24 RX ADMIN — OXYCODONE 5 MG: 5 TABLET ORAL at 02:03

## 2024-03-24 RX ADMIN — TRAZODONE HYDROCHLORIDE 100 MG: 100 TABLET ORAL at 08:03

## 2024-03-24 NOTE — PROGRESS NOTES
[facility] - Cardiology Stepdown  Cardiothoracic Surgery  Progress Note    Patient Name: Mora Adrian  MRN: [id number]  Admission Date: 3/15/2024  Hospital Length of Stay: 9 days  Code Status: Full Code   Attending Physician: Pedrito Bernal MD   Referring Provider: Pedrito Bernal MD  Principal Problem:Nonrheumatic mitral valve regurgitation      Subjective:     Post-Op Info:  Procedure(s) (LRB):  REPAIR, MITRAL VALVE, OPEN (N/A)  EXCLUSION, LEFT ATRIAL APPENDAGE, OPEN, AS PART OF OPEN CHEST SURGERY (N/A)   9 Days Post-Op     Interval History: No acute events overnight. Continues to report progressive improvement. Hoping to go home soon. On 1-2L NC    Review of Systems   Constitutional: Positive for malaise/fatigue.   Cardiovascular:  Positive for dyspnea on exertion. Negative for chest pain.   Respiratory:  Positive for shortness of breath.    Neurological:  Positive for weakness.     Medications:  Continuous Infusions:  Scheduled Meds:   acetaminophen  1,000 mg Oral Q6H    aspirin  325 mg Oral Daily    docusate sodium  100 mg Oral BID    ezetimibe  10 mg Oral QHS    furosemide (LASIX) injection  40 mg Intravenous BID    levalbuterol  0.63 mg Nebulization Q12H    levothyroxine  150 mcg Oral Before breakfast    magnesium oxide  800 mg Oral Daily    methocarbamoL  500 mg Oral TID    metoprolol tartrate  50 mg Oral BID    pantoprazole  40 mg Oral Daily    polyethylene glycol  17 g Oral Daily    potassium chloride  20 mEq Oral BID    traZODone  100 mg Oral QHS     PRN Meds:bisacodyL, dextrose 10%, dextrose 10%, ondansetron, oxyCODONE, oxyCODONE, prochlorperazine, sodium chloride 0.9%     Objective:     Vital Signs (Most Recent):  Temp: 97.4 °F (36.3 °C) (03/24/24 0427)  Pulse: 82 (03/24/24 1111)  Resp: 16 (03/24/24 0822)  BP: (!) 116/55 (03/24/24 0427)  SpO2: 99 % (03/24/24 0822) Vital Signs (24h Range):  Temp:  [97.4 °F (36.3 °C)-98.2 °F (36.8 °C)] 97.4 °F (36.3 °C)  Pulse:  [] 82  Resp:  [16-20]  16  SpO2:  [92 %-99 %] 99 %  BP: (107-128)/(52-80) 116/55     Weight: 96.7 kg (213 lb 3 oz)  Body mass index is 34.41 kg/m².    SpO2: 99 %       Intake/Output - Last 3 Shifts         03/22 0700  03/23 0659 03/23 0700  03/24 0659 03/24 0700  03/25 0659    P.O. 720 720     Total Intake(mL/kg) 720 (7.3) 720 (7.4)     Urine (mL/kg/hr) 2550 (1.1) 2000 (0.9)     Emesis/NG output 0      Stool 0 0     Total Output 2550 2000     Net -1830 -1280            Urine Occurrence 2 x      Stool Occurrence 1 x 1 x     Emesis Occurrence 0 x              Lines/Drains/Airways       Drain  Duration             Female External Urinary Catheter w/ Suction 03/17/24 7 days              Peripheral Intravenous Line  Duration                  Peripheral IV - Single Lumen 03/15/24 0805 16 G Right;Posterior Forearm 9 days                     Physical Exam  Vitals reviewed.   Constitutional:       General: She is not in acute distress.     Appearance: She is well-developed. She is not diaphoretic.   HENT:      Head: Normocephalic and atraumatic.   Eyes:      Pupils: Pupils are equal, round, and reactive to light.   Neck:      Vascular: No JVD.   Cardiovascular:      Rate and Rhythm: Normal rate and regular rhythm.      Comments: Midline sternal incision c/d/i  Pulmonary:      Effort: Pulmonary effort is normal. No respiratory distress.      Comments: 1-2L NC  Abdominal:      General: There is no distension.   Musculoskeletal:         General: Swelling (improving lower extremity) present. Normal range of motion.      Cervical back: Normal range of motion.   Skin:     Coloration: Skin is not pale.   Neurological:      Mental Status: She is alert. Mental status is at baseline.   Psychiatric:         Speech: Speech normal.         Behavior: Behavior normal.         Thought Content: Thought content normal.         Judgment: Judgment normal.            Significant Labs:  All pertinent labs from the last 24 hours have been reviewed.    Significant  Diagnostics:  I have reviewed all pertinent imaging results/findings within the past 24 hours.  Assessment/Plan:     Hyponatremia  BMP daily     Obesity (BMI 30-39.9)  PT OT     Hypophosphatemia  Replaced PO   Daily labs   Resolved     Acute blood loss anemia  Patient with baseline anemia compounded with expected post operative blood loss   CBC daily   (H/H 11.7/36 at pre-op visit)     Status post ligation of left atrial appendage  NSR with some PVCs on monitor   BB    S/P mitral valve repair  ASA  BB   Lasix 40 IV BID  Potassium replacements   PT/OT   Bowel regimen   Pain regimen   Cardiac diet with 1500mL fluid restriction. Boost    Tubes and wires out   Wean oxygen as tolerated. Goal >88%. Ok for patient to ambulate pending no dizziness and titrate oxygen as needed.  Can use platform walker.       Breathing treatments ordered.      TTE with no MR     Transient hyperglycemia post procedure  Endocrine following     Hypothyroidism  Home synthroid     HLD (hyperlipidemia)  Home Zetia in place of statin     HTN (hypertension)  Hypotension when in ICU. Received fluids and pressure stable       GERD (gastroesophageal reflux disease)  Protonix     Heart failure with reduced ejection fraction  Wean oxygen as tolerated. Goal O2 saturation 88 and above   Continue Diuresis         Israel Esposito, DO  Cardiothoracic Surgery  [facility] - Cardiology Stepdown

## 2024-03-24 NOTE — SUBJECTIVE & OBJECTIVE
Interval History: No acute events overnight. Continues to report progressive improvement. Hoping to go home soon. On 1-2L NC    Review of Systems   Constitutional: Positive for malaise/fatigue.   Cardiovascular:  Positive for dyspnea on exertion. Negative for chest pain.   Respiratory:  Positive for shortness of breath.    Neurological:  Positive for weakness.     Medications:  Continuous Infusions:  Scheduled Meds:   acetaminophen  1,000 mg Oral Q6H    aspirin  325 mg Oral Daily    docusate sodium  100 mg Oral BID    ezetimibe  10 mg Oral QHS    furosemide (LASIX) injection  40 mg Intravenous BID    levalbuterol  0.63 mg Nebulization Q12H    levothyroxine  150 mcg Oral Before breakfast    magnesium oxide  800 mg Oral Daily    methocarbamoL  500 mg Oral TID    metoprolol tartrate  50 mg Oral BID    pantoprazole  40 mg Oral Daily    polyethylene glycol  17 g Oral Daily    potassium chloride  20 mEq Oral BID    traZODone  100 mg Oral QHS     PRN Meds:bisacodyL, dextrose 10%, dextrose 10%, ondansetron, oxyCODONE, oxyCODONE, prochlorperazine, sodium chloride 0.9%     Objective:     Vital Signs (Most Recent):  Temp: 97.4 °F (36.3 °C) (03/24/24 0427)  Pulse: 82 (03/24/24 1111)  Resp: 16 (03/24/24 0822)  BP: (!) 116/55 (03/24/24 0427)  SpO2: 99 % (03/24/24 0822) Vital Signs (24h Range):  Temp:  [97.4 °F (36.3 °C)-98.2 °F (36.8 °C)] 97.4 °F (36.3 °C)  Pulse:  [] 82  Resp:  [16-20] 16  SpO2:  [92 %-99 %] 99 %  BP: (107-128)/(52-80) 116/55     Weight: 96.7 kg (213 lb 3 oz)  Body mass index is 34.41 kg/m².    SpO2: 99 %       Intake/Output - Last 3 Shifts         03/22 0700  03/23 0659 03/23 0700 03/24 0659 03/24 0700  03/25 0659    P.O. 720 720     Total Intake(mL/kg) 720 (7.3) 720 (7.4)     Urine (mL/kg/hr) 2550 (1.1) 2000 (0.9)     Emesis/NG output 0      Stool 0 0     Total Output 2550 2000     Net -1830 -1280            Urine Occurrence 2 x      Stool Occurrence 1 x 1 x     Emesis Occurrence 0 x               Lines/Drains/Airways       Drain  Duration             Female External Urinary Catheter w/ Suction 03/17/24 7 days              Peripheral Intravenous Line  Duration                  Peripheral IV - Single Lumen 03/15/24 0805 16 G Right;Posterior Forearm 9 days                     Physical Exam  Vitals reviewed.   Constitutional:       General: She is not in acute distress.     Appearance: She is well-developed. She is not diaphoretic.   HENT:      Head: Normocephalic and atraumatic.   Eyes:      Pupils: Pupils are equal, round, and reactive to light.   Neck:      Vascular: No JVD.   Cardiovascular:      Rate and Rhythm: Normal rate and regular rhythm.      Comments: Midline sternal incision c/d/i  Pulmonary:      Effort: Pulmonary effort is normal. No respiratory distress.      Comments: 1-2L NC  Abdominal:      General: There is no distension.   Musculoskeletal:         General: Swelling (improving lower extremity) present. Normal range of motion.      Cervical back: Normal range of motion.   Skin:     Coloration: Skin is not pale.   Neurological:      Mental Status: She is alert. Mental status is at baseline.   Psychiatric:         Speech: Speech normal.         Behavior: Behavior normal.         Thought Content: Thought content normal.         Judgment: Judgment normal.            Significant Labs:  All pertinent labs from the last 24 hours have been reviewed.    Significant Diagnostics:  I have reviewed all pertinent imaging results/findings within the past 24 hours.

## 2024-03-24 NOTE — PLAN OF CARE
Problem: Adult Inpatient Plan of Care  Goal: Plan of Care Review  Outcome: Ongoing, Progressing  Goal: Patient-Specific Goal (Individualized)  Outcome: Ongoing, Progressing  Goal: Absence of Hospital-Acquired Illness or Injury  Outcome: Ongoing, Progressing  Goal: Optimal Comfort and Wellbeing  Outcome: Ongoing, Progressing  Goal: Readiness for Transition of Care  Outcome: Ongoing, Progressing     Problem: Activity Intolerance (Cardiovascular Surgery)  Goal: Improved Activity Tolerance  Outcome: Ongoing, Progressing     Problem: Bowel Motility Impaired (Cardiovascular Surgery)  Goal: Effective Bowel Elimination (Cardiovascular Surgery)  Outcome: Ongoing, Progressing     Problem: Cardiac Function Impaired (Cardiovascular Surgery)  Goal: Effective Cardiac Function  Outcome: Ongoing, Progressing     Problem: Fluid and Electrolyte Imbalance (Cardiovascular Surgery)  Goal: Fluid and Electrolyte Balance (Cardiovascular Surgery)  Outcome: Ongoing, Progressing     Problem: Infection  Goal: Absence of Infection Signs and Symptoms  Outcome: Ongoing, Progressing     Problem: Skin Injury Risk Increased  Goal: Skin Health and Integrity  Outcome: Ongoing, Progressing     Problem: Fall Injury Risk  Goal: Absence of Fall and Fall-Related Injury  Outcome: Ongoing, Progressing

## 2024-03-24 NOTE — PLAN OF CARE
Problem: Adult Inpatient Plan of Care  Goal: Plan of Care Review  Outcome: Ongoing, Progressing  Goal: Patient-Specific Goal (Individualized)  Outcome: Ongoing, Progressing  Goal: Absence of Hospital-Acquired Illness or Injury  Outcome: Ongoing, Progressing  Goal: Optimal Comfort and Wellbeing  Outcome: Ongoing, Progressing  Goal: Readiness for Transition of Care  Outcome: Ongoing, Progressing     Problem: Activity Intolerance (Cardiovascular Surgery)  Goal: Improved Activity Tolerance  Outcome: Ongoing, Progressing     Problem: Adjustment to Surgery (Cardiovascular Surgery)  Goal: Optimal Coping with Heart Surgery  Outcome: Ongoing, Progressing     Problem: Bleeding (Cardiovascular Surgery)  Goal: Bleeding (Cardiovascular Surgery)  Outcome: Ongoing, Progressing     Problem: Bowel Motility Impaired (Cardiovascular Surgery)  Goal: Effective Bowel Elimination (Cardiovascular Surgery)  Outcome: Ongoing, Progressing     Problem: Cardiac Function Impaired (Cardiovascular Surgery)  Goal: Effective Cardiac Function  Outcome: Ongoing, Progressing     Problem: Cerebral Tissue Perfusion (Cardiovascular Surgery)  Goal: Optimal Cerebral Tissue Perfusion (Cardiovascular Surgery)  Outcome: Ongoing, Progressing     Problem: Fluid and Electrolyte Imbalance (Cardiovascular Surgery)  Goal: Fluid and Electrolyte Balance (Cardiovascular Surgery)  Outcome: Ongoing, Progressing     Problem: Glycemic Control Impaired (Cardiovascular Surgery)  Goal: Blood Glucose Level Within Targeted Range (Cardiovascular Surgery)  Outcome: Ongoing, Progressing     Problem: Infection (Cardiovascular Surgery)  Goal: Absence of Infection Signs and Symptoms  Outcome: Ongoing, Progressing     Problem: Pain (Cardiovascular Surgery)  Goal: Acceptable Pain Control  Outcome: Ongoing, Progressing     Problem: Postoperative Nausea and Vomiting (Cardiovascular Surgery)  Goal: Nausea and Vomiting Relief (Cardiovascular Surgery)  Outcome: Ongoing, Progressing      Problem: Postoperative Urinary Retention (Cardiovascular Surgery)  Goal: Effective Urinary Elimination (Cardiovascular Surgery)  Outcome: Ongoing, Progressing     Problem: Respiratory Compromise (Cardiovascular Surgery)  Goal: Effective Oxygenation and Ventilation (Cardiovascular Surgery)  Outcome: Ongoing, Progressing     Problem: Infection  Goal: Absence of Infection Signs and Symptoms  Outcome: Ongoing, Progressing     Problem: Skin Injury Risk Increased  Goal: Skin Health and Integrity  Outcome: Ongoing, Progressing     Problem: Fall Injury Risk  Goal: Absence of Fall and Fall-Related Injury  Outcome: Ongoing, Progressing

## 2024-03-25 ENCOUNTER — DOCUMENTATION ONLY (OUTPATIENT)
Dept: CARDIOTHORACIC SURGERY | Facility: CLINIC | Age: 69
End: 2024-03-25
Payer: MEDICARE

## 2024-03-25 VITALS
TEMPERATURE: 97 F | SYSTOLIC BLOOD PRESSURE: 114 MMHG | BODY MASS INDEX: 31.34 KG/M2 | HEIGHT: 69 IN | OXYGEN SATURATION: 95 % | RESPIRATION RATE: 18 BRPM | HEART RATE: 79 BPM | WEIGHT: 211.63 LBS | DIASTOLIC BLOOD PRESSURE: 51 MMHG

## 2024-03-25 LAB
ANION GAP SERPL CALC-SCNC: 8 MMOL/L (ref 8–16)
ASCENDING AORTA: 2.8 CM
AV INDEX (PROSTH): 0.79
AV MEAN GRADIENT: 3 MMHG
AV PEAK GRADIENT: 6 MMHG
AV VALVE AREA BY VELOCITY RATIO: 2.77 CM²
AV VALVE AREA: 2.82 CM²
AV VELOCITY RATIO: 0.78
BASOPHILS # BLD AUTO: 0.03 K/UL (ref 0–0.2)
BASOPHILS NFR BLD: 0.6 % (ref 0–1.9)
BSA FOR ECHO PROCEDURE: 2.16 M2
BUN SERPL-MCNC: 29 MG/DL (ref 8–23)
CALCIUM SERPL-MCNC: 9.4 MG/DL (ref 8.7–10.5)
CHLORIDE SERPL-SCNC: 95 MMOL/L (ref 95–110)
CO2 SERPL-SCNC: 30 MMOL/L (ref 23–29)
CREAT SERPL-MCNC: 1.2 MG/DL (ref 0.5–1.4)
CV ECHO LV RWT: 0.3 CM
DIFFERENTIAL METHOD BLD: ABNORMAL
DOP CALC AO PEAK VEL: 1.22 M/S
DOP CALC AO VTI: 23.01 CM
DOP CALC LVOT AREA: 3.6 CM2
DOP CALC LVOT DIAMETER: 2.13 CM
DOP CALC LVOT PEAK VEL: 0.95 M/S
DOP CALC LVOT STROKE VOLUME: 64.78 CM3
DOP CALC MV VTI: 20.06 CM
DOP CALCLVOT PEAK VEL VTI: 18.19 CM
E WAVE DECELERATION TIME: 410.35 MSEC
E/A RATIO: 1.29
E/E' RATIO: 8.27 M/S
ECHO LV POSTERIOR WALL: 0.86 CM (ref 0.6–1.1)
EOSINOPHIL # BLD AUTO: 0.2 K/UL (ref 0–0.5)
EOSINOPHIL NFR BLD: 3.2 % (ref 0–8)
ERYTHROCYTE [DISTWIDTH] IN BLOOD BY AUTOMATED COUNT: 13.1 % (ref 11.5–14.5)
EST. GFR  (NO RACE VARIABLE): 49.3 ML/MIN/1.73 M^2
FRACTIONAL SHORTENING: 15 % (ref 28–44)
GLUCOSE SERPL-MCNC: 95 MG/DL (ref 70–110)
HCT VFR BLD AUTO: 28.1 % (ref 37–48.5)
HGB BLD-MCNC: 9.1 G/DL (ref 12–16)
HR MV ECHO: 75 BPM
IMM GRANULOCYTES # BLD AUTO: 0.05 K/UL (ref 0–0.04)
IMM GRANULOCYTES NFR BLD AUTO: 0.9 % (ref 0–0.5)
INTERVENTRICULAR SEPTUM: 0.66 CM (ref 0.6–1.1)
LA MAJOR: 5.16 CM
LA MINOR: 5.17 CM
LA WIDTH: 4.16 CM
LEFT ATRIUM SIZE: 3.6 CM
LEFT ATRIUM VOLUME INDEX MOD: 26.2 ML/M2
LEFT ATRIUM VOLUME INDEX: 31 ML/M2
LEFT ATRIUM VOLUME MOD: 55.55 CM3
LEFT ATRIUM VOLUME: 65.75 CM3
LEFT INTERNAL DIMENSION IN SYSTOLE: 4.81 CM (ref 2.1–4)
LEFT VENTRICLE DIASTOLIC VOLUME INDEX: 74.48 ML/M2
LEFT VENTRICLE DIASTOLIC VOLUME: 157.9 ML
LEFT VENTRICLE MASS INDEX: 75 G/M2
LEFT VENTRICLE SYSTOLIC VOLUME INDEX: 51 ML/M2
LEFT VENTRICLE SYSTOLIC VOLUME: 108.03 ML
LEFT VENTRICULAR INTERNAL DIMENSION IN DIASTOLE: 5.67 CM (ref 3.5–6)
LEFT VENTRICULAR MASS: 158.19 G
LV LATERAL E/E' RATIO: 5.64 M/S
LV SEPTAL E/E' RATIO: 15.5 M/S
LYMPHOCYTES # BLD AUTO: 1.4 K/UL (ref 1–4.8)
LYMPHOCYTES NFR BLD: 27.3 % (ref 18–48)
MAGNESIUM SERPL-MCNC: 2 MG/DL (ref 1.6–2.6)
MCH RBC QN AUTO: 32.5 PG (ref 27–31)
MCHC RBC AUTO-ENTMCNC: 32.4 G/DL (ref 32–36)
MCV RBC AUTO: 100 FL (ref 82–98)
MONOCYTES # BLD AUTO: 0.6 K/UL (ref 0.3–1)
MONOCYTES NFR BLD: 10.8 % (ref 4–15)
MV MEAN GRADIENT: 1 MMHG
MV PEAK A VEL: 0.48 M/S
MV PEAK E VEL: 0.62 M/S
MV PEAK GRADIENT: 4 MMHG
MV STENOSIS PRESSURE HALF TIME: 119 MS
MV VALVE AREA BY CONTINUITY EQUATION: 3.23 CM2
MV VALVE AREA P 1/2 METHOD: 1.85 CM2
NEUTROPHILS # BLD AUTO: 3 K/UL (ref 1.8–7.7)
NEUTROPHILS NFR BLD: 57.2 % (ref 38–73)
NRBC BLD-RTO: 0 /100 WBC
PHOSPHATE SERPL-MCNC: 3.3 MG/DL (ref 2.7–4.5)
PISA TR MAX VEL: 1.96 M/S
PLATELET # BLD AUTO: 185 K/UL (ref 150–450)
PMV BLD AUTO: 9.8 FL (ref 9.2–12.9)
POTASSIUM SERPL-SCNC: 3.7 MMOL/L (ref 3.5–5.1)
RA MAJOR: 4.62 CM
RA PRESSURE ESTIMATED: 3 MMHG
RA WIDTH: 4.3 CM
RBC # BLD AUTO: 2.8 M/UL (ref 4–5.4)
RIGHT VENTRICULAR END-DIASTOLIC DIMENSION: 1.88 CM
RV TB RVSP: 5 MMHG
SINUS: 3.32 CM
SODIUM SERPL-SCNC: 133 MMOL/L (ref 136–145)
STJ: 2.54 CM
TDI LATERAL: 0.11 M/S
TDI SEPTAL: 0.04 M/S
TDI: 0.08 M/S
TR MAX PG: 15 MMHG
TRICUSPID ANNULAR PLANE SYSTOLIC EXCURSION: 0.61 CM
TV REST PULMONARY ARTERY PRESSURE: 18 MMHG
WBC # BLD AUTO: 5.28 K/UL (ref 3.9–12.7)
Z-SCORE OF LEFT VENTRICULAR DIMENSION IN END DIASTOLE: -1.67
Z-SCORE OF LEFT VENTRICULAR DIMENSION IN END SYSTOLE: 1.28

## 2024-03-25 PROCEDURE — 25000003 PHARM REV CODE 250: Performed by: PHYSICIAN ASSISTANT

## 2024-03-25 PROCEDURE — 63600175 PHARM REV CODE 636 W HCPCS: Performed by: STUDENT IN AN ORGANIZED HEALTH CARE EDUCATION/TRAINING PROGRAM

## 2024-03-25 PROCEDURE — 36415 COLL VENOUS BLD VENIPUNCTURE: CPT | Performed by: PHYSICIAN ASSISTANT

## 2024-03-25 PROCEDURE — 25000003 PHARM REV CODE 250: Performed by: STUDENT IN AN ORGANIZED HEALTH CARE EDUCATION/TRAINING PROGRAM

## 2024-03-25 PROCEDURE — 80048 BASIC METABOLIC PNL TOTAL CA: CPT | Performed by: PHYSICIAN ASSISTANT

## 2024-03-25 PROCEDURE — 97535 SELF CARE MNGMENT TRAINING: CPT

## 2024-03-25 PROCEDURE — 84100 ASSAY OF PHOSPHORUS: CPT | Performed by: STUDENT IN AN ORGANIZED HEALTH CARE EDUCATION/TRAINING PROGRAM

## 2024-03-25 PROCEDURE — 83735 ASSAY OF MAGNESIUM: CPT | Performed by: STUDENT IN AN ORGANIZED HEALTH CARE EDUCATION/TRAINING PROGRAM

## 2024-03-25 PROCEDURE — 97530 THERAPEUTIC ACTIVITIES: CPT

## 2024-03-25 PROCEDURE — 85025 COMPLETE CBC W/AUTO DIFF WBC: CPT | Performed by: STUDENT IN AN ORGANIZED HEALTH CARE EDUCATION/TRAINING PROGRAM

## 2024-03-25 RX ORDER — LIDOCAINE 50 MG/G
1 PATCH TOPICAL DAILY PRN
Qty: 15 PATCH | Refills: 0 | Status: SHIPPED | OUTPATIENT
Start: 2024-03-25 | End: 2024-04-11

## 2024-03-25 RX ORDER — METOPROLOL TARTRATE 50 MG/1
50 TABLET ORAL 2 TIMES DAILY
Qty: 60 TABLET | Refills: 11 | Status: ON HOLD | OUTPATIENT
Start: 2024-03-25 | End: 2024-03-31 | Stop reason: HOSPADM

## 2024-03-25 RX ORDER — POTASSIUM CHLORIDE 20 MEQ/1
TABLET, EXTENDED RELEASE ORAL
Qty: 60 TABLET | Refills: 11 | Status: SHIPPED | OUTPATIENT
Start: 2024-03-25 | End: 2024-04-11

## 2024-03-25 RX ORDER — ASPIRIN 325 MG
325 TABLET, DELAYED RELEASE (ENTERIC COATED) ORAL DAILY
Qty: 30 TABLET | Refills: 11 | Status: SHIPPED | OUTPATIENT
Start: 2024-03-26 | End: 2025-03-26

## 2024-03-25 RX ORDER — FUROSEMIDE 20 MG/1
TABLET ORAL
Qty: 60 TABLET | Refills: 11 | Status: SHIPPED | OUTPATIENT
Start: 2024-03-25 | End: 2024-04-11

## 2024-03-25 RX ORDER — OXYCODONE HYDROCHLORIDE 5 MG/1
5 TABLET ORAL EVERY 4 HOURS PRN
Qty: 42 TABLET | Refills: 0 | Status: SHIPPED | OUTPATIENT
Start: 2024-03-25 | End: 2024-04-11

## 2024-03-25 RX ORDER — ACETAMINOPHEN 500 MG
1000 TABLET ORAL EVERY 8 HOURS PRN
Qty: 30 TABLET | Refills: 0 | Status: SHIPPED | OUTPATIENT
Start: 2024-03-25 | End: 2024-04-11

## 2024-03-25 RX ORDER — POLYETHYLENE GLYCOL 3350 17 G/17G
17 POWDER, FOR SOLUTION ORAL DAILY
Qty: 238 G | Refills: 0 | Status: SHIPPED | OUTPATIENT
Start: 2024-03-26 | End: 2024-04-11

## 2024-03-25 RX ORDER — METHOCARBAMOL 500 MG/1
500 TABLET, FILM COATED ORAL 3 TIMES DAILY
Qty: 30 TABLET | Refills: 0 | Status: SHIPPED | OUTPATIENT
Start: 2024-03-25 | End: 2024-04-11

## 2024-03-25 RX ADMIN — PANTOPRAZOLE SODIUM 40 MG: 40 TABLET, DELAYED RELEASE ORAL at 08:03

## 2024-03-25 RX ADMIN — ACETAMINOPHEN 1000 MG: 500 TABLET ORAL at 12:03

## 2024-03-25 RX ADMIN — POTASSIUM CHLORIDE 20 MEQ: 1500 TABLET, EXTENDED RELEASE ORAL at 08:03

## 2024-03-25 RX ADMIN — LEVOTHYROXINE SODIUM 150 MCG: 150 TABLET ORAL at 06:03

## 2024-03-25 RX ADMIN — DOCUSATE SODIUM 100 MG: 100 CAPSULE, LIQUID FILLED ORAL at 08:03

## 2024-03-25 RX ADMIN — OXYCODONE 5 MG: 5 TABLET ORAL at 02:03

## 2024-03-25 RX ADMIN — METOPROLOL TARTRATE 50 MG: 50 TABLET, FILM COATED ORAL at 08:03

## 2024-03-25 RX ADMIN — FUROSEMIDE 40 MG: 10 INJECTION, SOLUTION INTRAVENOUS at 09:03

## 2024-03-25 RX ADMIN — METHOCARBAMOL 500 MG: 500 TABLET ORAL at 08:03

## 2024-03-25 RX ADMIN — Medication 800 MG: at 08:03

## 2024-03-25 RX ADMIN — ACETAMINOPHEN 1000 MG: 500 TABLET ORAL at 06:03

## 2024-03-25 RX ADMIN — ASPIRIN 325 MG: 325 TABLET, COATED ORAL at 08:03

## 2024-03-25 NOTE — PLAN OF CARE
Problem: Adult Inpatient Plan of Care  Goal: Plan of Care Review  Outcome: Ongoing, Progressing  Goal: Patient-Specific Goal (Individualized)  Outcome: Ongoing, Progressing  Goal: Absence of Hospital-Acquired Illness or Injury  Outcome: Ongoing, Progressing  Goal: Optimal Comfort and Wellbeing  Outcome: Ongoing, Progressing  Goal: Readiness for Transition of Care  Outcome: Ongoing, Progressing     Problem: Cardiac Function Impaired (Cardiovascular Surgery)  Goal: Effective Cardiac Function  Outcome: Ongoing, Progressing     Problem: Infection (Cardiovascular Surgery)  Goal: Absence of Infection Signs and Symptoms  Outcome: Ongoing, Progressing     Problem: Pain (Cardiovascular Surgery)  Goal: Acceptable Pain Control  Outcome: Ongoing, Progressing

## 2024-03-25 NOTE — NURSING
Patient is ready for discharge. Patient stable alert and oriented. IV and telemetry removed. No complaints of pain. Discussed discharge plan. Reviewed medications and side effects, appointments, and answered questions with patient and family. Pt picking up RX from pharmacy.

## 2024-03-25 NOTE — HOSPITAL COURSE
On 3/15/24, the patient was taken to the Operating Room for the above stated procedure. Please see the previously dictated operative report for complete details. Postoperatively, the patient was taken from the  Operating Room to the ICU where the vital signs were monitored and pain was kept under control. The patient was weaned from the drips and extubated in the ICU per protocol. Once hemodynamically stable, the patient was transferred to the Cardiac Step-Down floor for continued strengthening and ambulation. Patient had episodes of exertional desaturation when ambulating for which she required 6 L NC. She was was aggressively diuresed  and was weaned to room air at the time of discharge. On postoperative day 10, the patient was ready for discharge to home. At the time of discharge, the patient was ambulating unassisted. Pain was well controlled with oral analgesics and the patient was tolerating the diet. Discharged with home health service.      MOBILITY AND ACTIVITY: As tolerated. Patient may shower. No heavy lifting of greater than 5 pounds and no driving.     DIET: An 1800-calorie ADA with a 1500 mL fluid restriction.     WOUND CARE INSTRUCTIONS: Check for redness, swelling and drainage around the  incision or wound. Patient is to call for any obvious bleeding, drainage, pus from the wound, unusual problems or difficulties or temperature of greater than 101   degrees.     FOLLOWUP: Follow up with Dr. Bernal in approximately 3 weeks. Prior to this  appointment, the patient will have a chest x-ray and EKG.     Patient not placed on Ace-Inhibitor at the time of discharge due to potential for hypotension       DISCHARGE CONDITION: At the time of discharge, the patient was in sinus rhythm and afebrile with stable vital signs.

## 2024-03-25 NOTE — PLAN OF CARE
03/25/24 1133   Final Note   Assessment Type Final Discharge Note   Anticipated Discharge Disposition Home-Health   What phone number can be called within the next 1-3 days to see how you are doing after discharge? [phone]   Hospital Resources/Appts/Education Provided Provided patient/caregiver with written discharge plan information;Provided education on problems/symptoms using teachback;Post-Acute resouces added to AVS   Post-Acute Status   Post-Acute Authorization Home Health   Home Health Status Set-up Complete/Auth obtained   Discharge Delays None known at this time     Patient discharging home with [facility]. Will follow up with Dr Bernal as per their scheduled appointment. Did call and follow up with [facility] and was informed per Chantel , patient will be admitted for home health tomorrow.     Lashawn Devries RN    [phone]    Future Appointments   Date Time Provider Department Center   4/11/2024 12:45 PM [facility] OIC-XRAY [facility] XRAY [facility]   4/11/2024  2:00 PM EKG, APPT [facility] EKG Asael Dominique   4/11/2024  3:00 PM Pedrito Bernal MD [facility] CARDVAS Asael Dominique

## 2024-03-25 NOTE — HPI
Ms. Adrian is a 68-year-old woman who was being evaluated for knee replacement.  As part of her cardiac clearance she had an echo which demonstrated a reduced ejection fraction, severe mitral regurgitation, and severe dilation of the left atrium.  She reported decreased stamina and increased fatigue.  Coronary angiography failed to demonstrate any hemodynamically significant lesions.  We plan mitral valve repair and resection of left atrial appendage given her risk for atrial fibrillation.

## 2024-03-25 NOTE — PT/OT/SLP PROGRESS
Physical Therapy Treatment    Patient Name:  Mora Adrian   MRN:  [id number]    Recommendations:     Discharge Recommendations: Low Intensity Therapy  Discharge Equipment Recommendations: shower chair  Barriers to discharge: None    Assessment:     Mora Adrian is a 68 y.o. female admitted with a medical diagnosis of Nonrheumatic mitral valve regurgitation.  She presents with the following impairments/functional limitations: weakness, impaired endurance, impaired self care skills, impaired functional mobility, gait instability, impaired balance, decreased safety awareness, pain, impaired cardiopulmonary response to activity. Pt w/ improved mobility, able to practice all mobility required to go home safely so that she feels confident to d/c home. Pt is currently mobilizing well enough to safely return home w/ family upon d/c but will benefit from continued treatment here and upon d/c to address the above listed impairments in order to progress back to PLOF.     Rehab Prognosis: Good; patient would benefit from acute skilled PT services to address these deficits and reach maximum level of function.    Recent Surgery: Procedure(s) (LRB):  REPAIR, MITRAL VALVE, OPEN (N/A)  EXCLUSION, LEFT ATRIAL APPENDAGE, OPEN, AS PART OF OPEN CHEST SURGERY (N/A) 10 Days Post-Op    Plan:     During this hospitalization, patient to be seen 5 x/week to address the identified rehab impairments via gait training, therapeutic activities, therapeutic exercises and progress toward the following goals:    Plan of Care Expires:  04/15/24    Subjective     Chief Complaint: chest pain  Patient/Family Comments/goals: wanted to practice getting in/out of bed  Pain/Comfort:  Pain Rating 1: 4/10  Location - Orientation 1: generalized  Location 1: sternal  Pain Addressed 1: Reposition, Distraction, Nurse notified  Pain Rating Post-Intervention 1: 5/10      Objective:     Communicated with RN prior to session.  Patient found up in chair with  telemetry upon PT entry to room.     General Precautions: Standard, fall, sternal  Orthopedic Precautions: N/A  Braces: N/A  Respiratory Status: Room air     Functional Mobility:  Bed Mobility:     Supine to Sit: stand by assistance  Sit to Supine: stand by assistance  Transfers:     Sit to Stand:  supervision with no AD x3 trials  Gait: 200' w/ supervision using no AD; pt w/ dec arm swing, dec step length, dec laine, and dec activity tolerance      AM-PAC 6 CLICK MOBILITY  Turning over in bed (including adjusting bedclothes, sheets and blankets)?: 4  Sitting down on and standing up from a chair with arms (e.g., wheelchair, bedside commode, etc.): 3  Moving from lying on back to sitting on the side of the bed?: 3  Moving to and from a bed to a chair (including a wheelchair)?: 3  Need to walk in hospital room?: 3  Climbing 3-5 steps with a railing?: 3  Basic Mobility Total Score: 19       Treatment & Education:  Pt educated on PT POC/goals, d/c recs, and continued treatment. All questions answered and pt in agreement w/ POC.  Sternal precautions: pt educated to avoid pulling, pushing, and lifting >5lbs until cleared by her MD. Pt educated on modifications to make when performing functional mobility and ADLs to assist w/ adherence to these precautions and maintaining pt safety.  Reviewed how to do bed mobility and transfers w/ precautions and all questions answered.  Gait training w/ verbal cueing for postural control and safety awareness     Patient left up in chair with all lines intact, call button in reach, and RN notified..    GOALS:   Multidisciplinary Problems       Physical Therapy Goals          Problem: Physical Therapy    Goal Priority Disciplines Outcome Goal Variances Interventions   Physical Therapy Goal     PT, PT/OT Ongoing, Progressing     Description: Goals to met by 3/30/2024    1. Sit to stand transfer with Stand-by Assistance  2. Bed to chair transfer with Stand-by Assistance using No Assistive  Device  3. Gait  x 100 feet with Stand-by Assistance using No Assistive Device   4. Ascend/Descend 6 inch curb step with Contact Guard Assistance using No Assistive Device.  5. Stand for 5 minutes with Supervision using No Assistive Device  6. Lower extremity exercise program x15 reps per Instruction, with assistance as needed in order to facilitate improvement in functional independence                       Time Tracking:     PT Received On: 03/25/24  PT Start Time: 1102     PT Stop Time: 1117  PT Total Time (min): 15 min     Billable Minutes: Therapeutic Activity 15    Treatment Type: Treatment  PT/PTA: PT     Number of PTA visits since last PT visit: 0     03/25/2024

## 2024-03-25 NOTE — PT/OT/SLP PROGRESS
Occupational Therapy   Treatment    Name: Mora Adrian  MRN: [id number]  Admitting Diagnosis:  Nonrheumatic mitral valve regurgitation  10 Days Post-Op    Recommendations:     Discharge Recommendations: Low Intensity Therapy  Discharge Equipment Recommendations:  shower chair  Barriers to discharge:  None    Assessment:     Mora Adrian is a 68 y.o. female with a medical diagnosis of Nonrheumatic mitral valve regurgitation.  She presents with impaired ADL and mobility performance deficits. Pt found upright in her chair and agreeable for functional mobility to simulate household environment. Pt tolerated session well on room air requiring x2 standing breaks 2/2 fatigue and voiced stiffness in BLE's. Pt eager to return home with educated provided on ADL/IADL management given with maintenance of sternal precautions.  Patient continues to demonstrate the need for low intensity therapy on a scheduled basis exhibited by decreased independence with self-care and functional mobility   Performance deficits affecting function are weakness, impaired self care skills, impaired endurance, impaired functional mobility, gait instability, decreased lower extremity function, decreased upper extremity function, orthopedic precautions.     Rehab Prognosis:  Good; patient would benefit from acute skilled OT services to address these deficits and reach maximum level of function.       Plan:     Patient to be seen 3 x/week to address the above listed problems via self-care/home management, therapeutic activities, therapeutic exercises, neuromuscular re-education  Plan of Care Expires: 04/15/24  Plan of Care Reviewed with: patient    Subjective     Chief Complaint: leg stiffness after mobility  Patient/Family Comments/goals: return home today  Pain/Comfort:  Pain Rating 1: 0/10  Pain Rating Post-Intervention 1: 0/10  Pain Rating Post-Intervention 2: 0/10    Objective:     Communicated with: RN prior to session.  Patient found up in  chair with telemetry upon OT entry to room.    General Precautions: Standard, fall, sternal    Orthopedic Precautions:N/A  Braces: N/A  Respiratory Status: Room air     Occupational Performance:     Bed Mobility:    NT    Functional Mobility/Transfers:  Patient completed Sit <> Stand Transfer with stand by assistance  with  no assistive device   Functional Mobility: Pt demonstrated functional mobility training to simulate household and community environment gait training during session. Pt tolerated ~6 minutes total using no AD with no LOB and good visual search and navigation strategies. Pt needed x2 standing breaks.      Activities of Daily Living:  Upper Body Dressing: contact guard assistance donning gown seated edge of chair  Lower Body Dressing: contact guard assistance donning shoes in standing       [facility] 6 Click ADL: 24    Treatment & Education:  Pt educated on role of occupational therapy, POC, and safety during ADLs and functional mobility. Pt and OT discussed importance of safe, continued mobility to optimize daily living skills. Pt verbalized understanding.     White board updated during session. Pt given instruction to call for medical staff/nurse for assistance.       Patient left up in chair with all lines intact, call button in reach, and RN notified    GOALS:   Multidisciplinary Problems       Occupational Therapy Goals          Problem: Occupational Therapy    Goal Priority Disciplines Outcome Interventions   Occupational Therapy Goal     OT, PT/OT Ongoing, Progressing    Description: Goals to be met by: 03-30-24     Patient will increase functional independence with ADLs by performing:    UE Dressing with [place].  LE Dressing with Supervision.  Grooming while standing at sink with Supervision.  Toileting from toilet with Supervision for hygiene and clothing management.   Supine to sit with Supervision.  Stand pivot transfers with Supervision.  Toilet transfer to toilet with  Supervision.  Pt. To recall sternal precautions with 100% accuracy                         Time Tracking:     OT Date of Treatment: 03/25/24  OT Start Time: 0929  OT Stop Time: 0948  OT Total Time (min): 19 min    Billable Minutes:Self Care/Home Management 19 min    OT/HERMAN: OT          3/25/2024

## 2024-03-25 NOTE — PROGRESS NOTES
Met with pt at bedside and reviewed wound care instructions for pt's midsternal and chest tube site incisions, s/p mitral valve repair and JEMAL resection.  Reviewed daily inspection and cleaning of surgical incisions and instructed pt to call the clinic with any questions or concerns.  Pt provided with a hand-out (see below) which contains detailed instructions on daily wound care inspection and care.  Pt reminded of her 5 pound lifting, pushing, and pulling restrictions for the first 6 weeks following his surgery and to refrain from driving until released by Dr. Bernal.  Pt informed of her post-op appts on April 11 and was provided with a copy of her appts for that day.  Pt verbalized understanding of all instructions.          Showering   If your incisions are healing and there is no drainage - it is ok to take a shower.  Shower with your back to the shower spray.  It is ok for your incision to get wet, but the shower spray should not directly hit your chest.  Do not soak in a tub.  The water temperature should be warm -- not too hot or cold. Extreme water temperatures can cause you to feel faint.  It is expected that you wash your incisions when you shower, however only use soap and water to cleanse the sites.  -Use normal bar soap, not perfumed soap or body wash. During your recovery, do not try a new brand of soap.  -Place soapy water on your hand or a clean washcloth and gently wash your incisions using an up-and-down motion.  -Do not apply ointments, oils, or salves to your incisions.  -Pat the skin gently to dry.     1. Wash your hands before and after caring for or touching your incisions.  2. Look in the mirror daily. Inspect your incisions for redness, drainage and warmth. Your incisions should be healing; there should NOT be an increase in opening.  3. Gently wash your incisions everyday with soap and warm water using a clean washcloth, or your hand and light touch.  4. Gently pat dry with a clean  towel.  5. You do not need to cover your incisions unless they are draining.  If you are experiencing drainage, it is important to call your doctor.  Monday - Friday, from 8:00am - 5pm, call [phone].  Outside of these hours, please call [phone], which is a 24-hour nurse care advice line.     When to call your doctor:  -Increased drainage or oozing from the incision(s)  -Increased opening of the incision line(s) - there should not be gaps or pulling apart areas of the incision(s)  -Redness along the incision(s) - if the incisions were red or pink when you left the hospital, they should be improving and not becoming more red  -Warmth along the incision line(s)  -Increased body temperature / Fever - greater than 101 degrees Fahrenheit  -If you have diabetes and your blood sugar levels begin to vary

## 2024-03-25 NOTE — PLAN OF CARE
Problem: Occupational Therapy  Goal: Occupational Therapy Goal  Description: Goals to be met by: 03-30-24     Patient will increase functional independence with ADLs by performing:    UE Dressing with Tripp.  LE Dressing with Supervision.  Grooming while standing at sink with Supervision.  Toileting from toilet with Supervision for hygiene and clothing management.   Supine to sit with Supervision.  Stand pivot transfers with Supervision.  Toilet transfer to toilet with Supervision.  Pt. To recall sternal precautions with 100% accuracy    Frequency decreased to 3x/wk.  Outcome: Ongoing, Progressing

## 2024-03-25 NOTE — PLAN OF CARE
CHW met with patient/family at bedside. Patient experience rounding completed and reviewed the following.     Do you know your discharge plan? Yes or No,    If yes, what is the plan? (Home, Home Health, Rehab, SNF, LTAC, or Other) Yes Home w/ HH    Have you discussed your needs and preferences with your SW/CM? Yes or No  Yes Home w/ HH    If you are discharging home, do you have help at home? Yes or No Yes (family)    Do you think you will need help additional at home at discharge? Yes or No  No    Do you currently have difficulty keeping up with bills, affording medicine or buying food? Yes or No No    Assigned SW/CM notified of any patient/family needs or concerns. Appropriate resources provided to address patient's needs.          Leidy Gaurdado CHW  Case Management  [phone]

## 2024-03-25 NOTE — DISCHARGE SUMMARY
[facility] - Cardiology Stepdown  Cardiothoracic Surgery  Discharge Summary      Patient Name: Mora Adrian  MRN: [id number]  Admission Date: 3/15/2024  Hospital Length of Stay: 10 days  Discharge Date and Time:  03/25/2024 11:58 AM  Attending Physician: Pedrito Bernal MD   Discharging Provider: Ginger Childs PA-C  Primary Care Provider: Marvin Bone MD    HPI:    Ms. Adrian is a 68-year-old woman who was being evaluated for knee replacement.  As part of her cardiac clearance she had an echo which demonstrated a reduced ejection fraction, severe mitral regurgitation, and severe dilation of the left atrium.  She reported decreased stamina and increased fatigue.  Coronary angiography failed to demonstrate any hemodynamically significant lesions.  We plan mitral valve repair and resection of left atrial appendage given her risk for atrial fibrillation.     Procedure(s) (LRB):  REPAIR, MITRAL VALVE, OPEN (N/A)  EXCLUSION, LEFT ATRIAL APPENDAGE, OPEN, AS PART OF OPEN CHEST SURGERY (N/A)      Indwelling Lines/Drains at time of discharge:   Lines/Drains/Airways       Drain  Duration             Female External Urinary Catheter w/ Suction 03/17/24 8 days                  Hospital Course: On 3/15/24, the patient was taken to the Operating Room for the above stated procedure. Please see the previously dictated operative report for complete details. Postoperatively, the patient was taken from the  Operating Room to the ICU where the vital signs were monitored and pain was kept under control. The patient was weaned from the drips and extubated in the ICU per protocol. Once hemodynamically stable, the patient was transferred to the Cardiac Step-Down floor for continued strengthening and ambulation. Patient had episodes of exertional desaturation when ambulating for which she required 6 L NC. She was was aggressively diuresed  and was weaned to room air at the time of discharge. On postoperative day 10, the patient  was ready for discharge to home. At the time of discharge, the patient was ambulating unassisted. Pain was well controlled with oral analgesics and the patient was tolerating the diet. Discharged with home health service.      MOBILITY AND ACTIVITY: As tolerated. Patient may shower. No heavy lifting of greater than 5 pounds and no driving.     DIET: An 1800-calorie ADA with a 1500 mL fluid restriction.     WOUND CARE INSTRUCTIONS: Check for redness, swelling and drainage around the  incision or wound. Patient is to call for any obvious bleeding, drainage, pus from the wound, unusual problems or difficulties or temperature of greater than 101   degrees.     FOLLOWUP: Follow up with Dr. Hussein in approximately 3 weeks. Prior to this  appointment, the patient will have a chest x-ray and EKG.     Patient not placed on Ace-Inhibitor at the time of discharge due to potential for hypotension       DISCHARGE CONDITION: At the time of discharge, the patient was in sinus rhythm and afebrile with stable vital signs.      Goals of Care Treatment Preferences:  Code Status: Full Code      Consults (From admission, onward)          Status Ordering Provider     Inpatient consult to Physical Medicine Rehab  Once        Provider:  (Not yet assigned)    Completed SAPPHIRE HUSSEIN     Inpatient consult to Cardiac Rehab  Once        Provider:  (Not yet assigned)    Completed PARIS NAVARRETE     Inpatient consult to Registered Dietitian/Nutritionist  Once        Provider:  (Not yet assigned)    Completed PARIS NAVARRETE     Consult to Endocrinology  Once        Provider:  (Not yet assigned)    Completed PARIS NAVARRETE     Consult Case Management/Social Work  Once        Provider:  (Not yet assigned)    Acknowledged PARIS NAVARRETE              No new Assessment & Plan notes have been filed under this hospital service since the last note was generated.  Service: Cardiothoracic Surgery    Final Active  Diagnoses:    Diagnosis Date Noted POA    PRINCIPAL PROBLEM:  Nonrheumatic mitral valve regurgitation [I34.0] 02/20/2024 Yes    Obesity (BMI 30-39.9) [E66.9] 03/20/2024 Yes    Hyponatremia [E87.1] 03/20/2024 Yes    S/P mitral valve repair [Z98.890] 03/19/2024 Not Applicable    Status post ligation of left atrial appendage [Z98.890] 03/19/2024 Not Applicable    Acute blood loss anemia [D62] 03/19/2024 Yes    Hypophosphatemia [E83.39] 03/19/2024 No    Transient hyperglycemia post procedure [R73.9] 03/15/2024 No    Surgical wound present [T14.8XXA] 03/15/2024 No    Heart failure with reduced ejection fraction [I50.20] 03/14/2024 Yes    GERD (gastroesophageal reflux disease) [K21.9] 03/14/2024 Yes    HTN (hypertension) [I10] 03/14/2024 Yes    HLD (hyperlipidemia) [E78.5] 03/14/2024 Yes    Hypothyroidism [E03.9] 03/14/2024 Yes      Problems Resolved During this Admission:      Discharged Condition: stable    Disposition: Home-Health Care [facility]    Follow Up:   Follow-up Information       [facility]. Call.    Why: Hospital follow up call day after discharge if you do not hear from them to set up time for visit.  Contact information:  [address] [place] [address]. [id number] [phone]                         Patient Instructions:      Ambulatory referral/consult to Home Health   Standing Status: Future   Referral Priority: Routine Referral Type: Home Health   Referral Reason: Specialty Services Required   Requested Specialty: Home Health Services   Number of Visits Requested: 1     Medications:  Reconciled Home Medications:      Medication List        START taking these medications      acetaminophen 500 MG tablet  Commonly known as: TYLENOL  Take 2 tablets (1,000 mg total) by mouth every 8 (eight) hours as needed for Pain.  Replaces: acetaminophen 650 MG Tbsr     aspirin 325 MG EC tablet  Commonly known as: ECOTRIN  Take 1 tablet (325 mg total) by mouth once daily.  Start taking on: March 26,  2024     furosemide 20 MG tablet  Commonly known as: LASIX  Take one tablet by mouth twice daily for 7 days then decrease to once daily     LIDOcaine 5 %  Commonly known as: LIDODERM  Place 1 patch onto the skin daily as needed (pain). Cut in half and place on sides of surgical incisional site. Do not place on top. Remove & Discard patch within 12 hours or as directed by MD     methocarbamoL 500 MG Tab  Commonly known as: ROBAXIN  Take 1 tablet (500 mg total) by mouth 3 (three) times daily. for 10 days     metoprolol tartrate 50 MG tablet  Commonly known as: LOPRESSOR  Take 1 tablet (50 mg total) by mouth 2 (two) times daily.     oxyCODONE 5 MG immediate release tablet  Commonly known as: ROXICODONE  Take 1 tablet (5 mg total) by mouth every 4 (four) hours as needed for Pain.     polyethylene glycol 17 gram Pwpk  Commonly known as: GLYCOLAX  Take 17 g by mouth once daily.  Start taking on: March 26, 2024     potassium chloride SA 20 MEQ tablet  Commonly known as: K-DUR,KLOR-CON  Take one tablet by mouth twice daily for 7 days then decrease to once daily            CONTINUE taking these medications      brimonidine 0.2% 0.2 % Drop  Commonly known as: ALPHAGAN  Place 1 drop into the right eye 2 (two) times daily.     estradiol 0.05 mg/24 hr td ptsw 0.05 mg/24 hr  Commonly known as: VIVELLE-DOT  1 patch.     ezetimibe 10 mg tablet  Commonly known as: ZETIA  Take 10 mg by mouth once daily.     levothyroxine 150 MCG tablet  Commonly known as: SYNTHROID  Take 150 mcg by mouth before breakfast.     magnesium 30 mg Tab  Take by mouth once.     pantoprazole 40 MG tablet  Commonly known as: PROTONIX  Take 40 mg by mouth once daily.     traZODone 100 MG tablet  Commonly known as: DESYREL  Take 100 mg by mouth every evening.     vitamin D 1000 units Tab  Commonly known as: VITAMIN D3  Take 1,000 Units by mouth once daily.     zinc gluconate 50 mg tablet  Take 50 mg by mouth once daily.            STOP taking these medications       acetaminophen 650 MG Tbsr  Commonly known as: TYLENOL  Replaced by: acetaminophen 500 MG tablet     benazepril-hydrochlorthiazide 20-12.5 mg per tablet  Commonly known as: LOTENSIN HCT     benazepril-hydrochlorthiazide 20-25 mg Tab  Commonly known as: LOTENSIN HCT     ibuprofen 800 MG tablet  Commonly known as: ADVIL,MOTRIN     metoprolol succinate 25 MG 24 hr tablet  Commonly known as: TOPROL-XL            ASK your doctor about these medications      cyanocobalamin 1,000 mcg/mL injection  1,000 mcg every 30 days.            Time spent on the discharge of patient: 60 minutes    Ginger Childs PA-C  Cardiothoracic Surgery  [facility] - Cardiology Stepdown

## 2024-03-26 PROCEDURE — G0180 MD CERTIFICATION HHA PATIENT: HCPCS | Mod: ,,, | Performed by: THORACIC SURGERY (CARDIOTHORACIC VASCULAR SURGERY)

## 2024-03-30 PROBLEM — I48.91 ATRIAL FIBRILLATION WITH RVR: Status: ACTIVE | Noted: 2024-03-30

## 2024-04-02 ENCOUNTER — TELEPHONE (OUTPATIENT)
Dept: CARDIOTHORACIC SURGERY | Facility: CLINIC | Age: 69
End: 2024-04-02
Payer: MEDICARE

## 2024-04-02 NOTE — TELEPHONE ENCOUNTER
Called pt following hospital discharge.  Pt reports she was admitted to hospital over the weekend for episode of A-Fib. She saw her primary cardiologist, Dr. Card, who instructed her to discontinue metoprolol and restart sotalol. She is scheduled for a follow up with him next Monday. Reinforced importance of monitoring BP and HR especially since she has had episode of A-Fib. Reiterated the need for pt to clean her incision everyday with soap and water and to remove steri-strips. Pt reported she is having difficulty removing the steri-strips. Reminded her that they should be removed at the end of her shower but she should be careful not to break the skin. Also suggested to pt that she request HH help remove the steri-strips at their next visit. Reinforced importance of frequent walking and IS use. Reminded pt not to drive for the first 4 weeks after surgery, and to refrain from lifting, pushing, and pulling anything greater than 5 pounds for the first 6 weeks following her surgery.  Instructed pt to perform daily weights.  Pt instructed to notify the clinic if she has a weight gain greater than 3 pounds in one day.  Pt instructed to call the clinic with any questions or concerns.  Pt verbalized understanding.

## 2024-04-11 ENCOUNTER — HOSPITAL ENCOUNTER (OUTPATIENT)
Dept: CARDIOLOGY | Facility: CLINIC | Age: 69
Discharge: HOME | End: 2024-04-11
Attending: THORACIC SURGERY (CARDIOTHORACIC VASCULAR SURGERY)
Payer: MEDICARE

## 2024-04-11 ENCOUNTER — OFFICE VISIT (OUTPATIENT)
Dept: CARDIOTHORACIC SURGERY | Facility: CLINIC | Age: 69
End: 2024-04-11
Payer: MEDICARE

## 2024-04-11 ENCOUNTER — HOSPITAL ENCOUNTER (OUTPATIENT)
Dept: RADIOLOGY | Facility: HOSPITAL | Age: 69
Discharge: HOME | End: 2024-04-11
Attending: THORACIC SURGERY (CARDIOTHORACIC VASCULAR SURGERY)
Payer: MEDICARE

## 2024-04-11 VITALS
HEIGHT: 66 IN | HEART RATE: 73 BPM | DIASTOLIC BLOOD PRESSURE: 59 MMHG | WEIGHT: 217.63 LBS | OXYGEN SATURATION: 98 % | SYSTOLIC BLOOD PRESSURE: 115 MMHG | BODY MASS INDEX: 34.98 KG/M2

## 2024-04-11 DIAGNOSIS — Z98.890 S/P MITRAL VALVE REPAIR: ICD-10-CM

## 2024-04-11 DIAGNOSIS — Z98.890 S/P MITRAL VALVE REPAIR: Primary | ICD-10-CM

## 2024-04-11 PROCEDURE — 71046 X-RAY EXAM CHEST 2 VIEWS: CPT | Mod: 26,,, | Performed by: RADIOLOGY

## 2024-04-11 PROCEDURE — 71046 X-RAY EXAM CHEST 2 VIEWS: CPT | Mod: TC,FY

## 2024-04-11 PROCEDURE — 99214 OFFICE O/P EST MOD 30 MIN: CPT | Mod: PBBFAC,25 | Performed by: THORACIC SURGERY (CARDIOTHORACIC VASCULAR SURGERY)

## 2024-04-11 PROCEDURE — 99024 POSTOP FOLLOW-UP VISIT: CPT | Mod: POP,,, | Performed by: THORACIC SURGERY (CARDIOTHORACIC VASCULAR SURGERY)

## 2024-04-11 PROCEDURE — 93010 ELECTROCARDIOGRAM REPORT: CPT | Mod: S$PBB,,, | Performed by: INTERNAL MEDICINE

## 2024-04-11 PROCEDURE — 99999 PR PBB SHADOW E&M-EST. PATIENT-LVL IV: CPT | Mod: PBBFAC,,, | Performed by: THORACIC SURGERY (CARDIOTHORACIC VASCULAR SURGERY)

## 2024-04-11 PROCEDURE — 93005 ELECTROCARDIOGRAM TRACING: CPT | Mod: PBBFAC | Performed by: INTERNAL MEDICINE

## 2024-04-11 RX ORDER — ROSUVASTATIN CALCIUM 5 MG/1
5 TABLET, COATED ORAL DAILY
COMMUNITY

## 2024-04-11 RX ORDER — METOPROLOL SUCCINATE 25 MG/1
25 TABLET, EXTENDED RELEASE ORAL DAILY
COMMUNITY

## 2024-04-11 NOTE — LETTER
April 11, 2024        Bobby Card III, MD  [address] [place] [id number]             [facility] - Cardiovasc Surg 2nd Fl  [address]  [facility] [address]  Phone: [phone]   Patient: Mora Adrian   MR Number: [id number]   YOB: 1955   Date of Visit: 4/11/2024       Dear Dr. Card:    Thank you for referring Mora Adrian to me for evaluation. Attached you will find relevant portions of my assessment and plan of care.    If you have questions, please do not hesitate to call me. I look forward to following Mora Adrian along with you.    Sincerely,      Pedrito Bernal MD            CC  No Recipients    Enclosure

## 2024-04-11 NOTE — PROGRESS NOTES
Patient seen and examined. Patient is progressively increasing activity. No significant complaints.     Sternum: stable, incision CDI  Chest xray: Acceptable post op chest  EKG: NSR     Assessment: 3/15/2024  1)  Mitral valve repair with a central Mary Jo stitch and 32 mm Med[employer]onic Future band annuloplasty.  2)  Resection of left atrial appendage     Plan:  Can begin driving   Can begin cardiac rehab 6 weeks after operation   We will refer to cardiology to assume care   DC lasix, potassium        No scheduled appointment, RTC prn    CTS Attending Note:    I have personally taken the history and examined this patient and agree with the REINA's note as stated above.

## 2024-04-11 NOTE — PATIENT INSTRUCTIONS
Please make appointments to check in with your PCP and Cardiologist in the next 2 to 6 weeks.    Continue walking during cooler parts of the day or early evening to build up your endurance.     You may drive, if you have power steering.    Your lifting restriction is still in place until you are 12 weeks out from your surgery:     ** 5 pounds first 6 weeks after surgery **   **20 pounds for weeks 7 - 12 after surgery **    COVID Precautions:    Continue to take precautions against COVID. Mask up when around unknown people, wash / sanitize hands frequently. Avoid large groups of people until at least 12 weeks post op.    Cardiac Rehab:    You are eligible for cardiac rehab, a 12-week exercise program designed for heart surgery patients. Please let us know if you are interested and we will send over orders for your referral. Below is a list of local facilities offering this program:    Facility Address Phone   [facility] [address] [place], [place] [address]vd., [place] [phone]   [facility] [address] [place] [address] [phone]   [facility] [id number] [place] [address] [address], [place] [address] [phone]   [facility] [address] [place] [address] [phone]   [facility] [address] [place] [phone]   [facility] [address] [place] [phone]   [address] [place] [phone]   [address] [phone]   [address] [place] [phone]

## 2024-04-12 LAB
OHS QRS DURATION: 84 MS
OHS QTC CALCULATION: 513 MS

## 2024-04-23 ENCOUNTER — DOCUMENT SCAN (OUTPATIENT)
Dept: HOME HEALTH SERVICES | Facility: HOSPITAL | Age: 69
End: 2024-04-23
Payer: MEDICARE

## 2024-04-26 ENCOUNTER — EXTERNAL HOME HEALTH (OUTPATIENT)
Dept: HOME HEALTH SERVICES | Facility: HOSPITAL | Age: 69
End: 2024-04-26
Payer: MEDICARE

## 2024-04-28 NOTE — ADDENDUM NOTE
Addendum  created 04/28/24 1039 by Jeremy Manuel MD    Attestation recorded in Intraprocedure, Intraprocedure Attestations filed

## 2024-05-01 ENCOUNTER — DOCUMENT SCAN (OUTPATIENT)
Dept: HOME HEALTH SERVICES | Facility: HOSPITAL | Age: 69
End: 2024-05-01
Payer: MEDICARE

## (undated) DEVICE — SUT VICRYL 3-0 27 SH

## (undated) DEVICE — SYR 30CC LUER LOCK

## (undated) DEVICE — INSERTS STEALTH FIBRA SIZE 5

## (undated) DEVICE — BLADE SAW STERNAL 5/BX

## (undated) DEVICE — DRAIN CHEST DRY SUCTION

## (undated) DEVICE — DRAPE CVMAX SPLIT ANES SCRN

## (undated) DEVICE — SUT 2-0 VICRYL / CT-1

## (undated) DEVICE — FOGERTY SOFT JAW DISP 2/PK

## (undated) DEVICE — SUT VICRYL BR 1 GEN 27 CT-1

## (undated) DEVICE — TIP YANKAUERS BULB NO VENT

## (undated) DEVICE — SUT PROLENE 4-0 SH BLU 36IN

## (undated) DEVICE — LOOP VESSEL BLUE MAXI

## (undated) DEVICE — CANNULA MULTIPLE PERFUSIONSET

## (undated) DEVICE — ELECTRODE REM PLYHSV RETURN 9

## (undated) DEVICE — TRAY HEART OMC

## (undated) DEVICE — SUT ETHIBOND XTRA 2-0 SH-2

## (undated) DEVICE — NDL 22GA X1 1/2 REG BEVEL

## (undated) DEVICE — KIT URINARY CATH URINE METER

## (undated) DEVICE — GLOVE BIOGEL PI MICRO SZ 7.5

## (undated) DEVICE — DRAPE SLUSH WARMER WITH DISC

## (undated) DEVICE — DRESSING AQUACEL SACRAL 9 X 9

## (undated) DEVICE — SUT PROLENE 4-0 RB-1 BL MO

## (undated) DEVICE — SUT SILK BLK BR. 2 2-60

## (undated) DEVICE — PAD DEFIB CADENCE ADULT R2

## (undated) DEVICE — DRAPE INCISE IOBAN 2 23X17IN

## (undated) DEVICE — SPONGE COTTON TRAY 4X4IN

## (undated) DEVICE — CANNULA RETROGRADE CARDIOPLEG

## (undated) DEVICE — SUT SILK 2-0 SH 18IN BLACK

## (undated) DEVICE — CONTAINER SPECIMEN OR STER 4OZ

## (undated) DEVICE — SUT VICRYL PLUS 3-0 FS1 27

## (undated) DEVICE — SOL 9P NACL IRR PIC IL

## (undated) DEVICE — SUT 2/0 30IN ETHIBOND

## (undated) DEVICE — Device

## (undated) DEVICE — DRAIN CHANNEL ROUND 19FR

## (undated) DEVICE — SUT 6 18IN STEEL MONO CCS

## (undated) DEVICE — SUT PROLENE 3-0 SH DA 36 BL

## (undated) DEVICE — BOWL STERILE LARGE 32OZ

## (undated) DEVICE — KIT SAHARA DRAPE DRAW/LIFT

## (undated) DEVICE — SUT PROLENE 5-0 24 C-1 BL

## (undated) DEVICE — DRESSING ADH ISLAND 3.6 X 14